# Patient Record
Sex: FEMALE | Race: BLACK OR AFRICAN AMERICAN | NOT HISPANIC OR LATINO | Employment: FULL TIME | URBAN - METROPOLITAN AREA
[De-identification: names, ages, dates, MRNs, and addresses within clinical notes are randomized per-mention and may not be internally consistent; named-entity substitution may affect disease eponyms.]

---

## 2017-09-20 ENCOUNTER — APPOINTMENT (OUTPATIENT)
Dept: LAB | Facility: HOSPITAL | Age: 52
End: 2017-09-20
Attending: OTOLARYNGOLOGY
Payer: COMMERCIAL

## 2017-09-20 ENCOUNTER — TRANSCRIBE ORDERS (OUTPATIENT)
Dept: LAB | Facility: HOSPITAL | Age: 52
End: 2017-09-20

## 2017-09-20 DIAGNOSIS — D44.0: Primary | ICD-10-CM

## 2017-09-20 PROCEDURE — 88173 CYTOPATH EVAL FNA REPORT: CPT

## 2019-04-08 ENCOUNTER — HOSPITAL ENCOUNTER (OUTPATIENT)
Facility: HOSPITAL | Age: 54
Setting detail: OBSERVATION
Discharge: HOME | End: 2019-04-09
Attending: EMERGENCY MEDICINE | Admitting: INTERNAL MEDICINE
Payer: COMMERCIAL

## 2019-04-08 DIAGNOSIS — R94.31 ABNORMAL ECG: ICD-10-CM

## 2019-04-08 DIAGNOSIS — R42 LIGHTHEADEDNESS: ICD-10-CM

## 2019-04-08 DIAGNOSIS — I10 HYPERTENSION: ICD-10-CM

## 2019-04-08 DIAGNOSIS — R07.9 CHEST PAIN, UNSPECIFIED TYPE: Primary | ICD-10-CM

## 2019-04-08 PROCEDURE — 93005 ELECTROCARDIOGRAM TRACING: CPT

## 2019-04-08 PROCEDURE — 99285 EMERGENCY DEPT VISIT HI MDM: CPT | Performed by: EMERGENCY MEDICINE

## 2019-04-08 PROCEDURE — 99285 EMERGENCY DEPT VISIT HI MDM: CPT

## 2019-04-09 ENCOUNTER — APPOINTMENT (OUTPATIENT)
Dept: NON INVASIVE DIAGNOSTICS | Facility: HOSPITAL | Age: 54
End: 2019-04-09
Payer: COMMERCIAL

## 2019-04-09 ENCOUNTER — APPOINTMENT (EMERGENCY)
Dept: RADIOLOGY | Facility: HOSPITAL | Age: 54
End: 2019-04-09
Payer: COMMERCIAL

## 2019-04-09 ENCOUNTER — APPOINTMENT (EMERGENCY)
Dept: CT IMAGING | Facility: HOSPITAL | Age: 54
End: 2019-04-09
Payer: COMMERCIAL

## 2019-04-09 VITALS
BODY MASS INDEX: 31.89 KG/M2 | DIASTOLIC BLOOD PRESSURE: 90 MMHG | WEIGHT: 180 LBS | SYSTOLIC BLOOD PRESSURE: 134 MMHG | RESPIRATION RATE: 21 BRPM | TEMPERATURE: 98.3 F | HEART RATE: 80 BPM | HEIGHT: 63 IN | OXYGEN SATURATION: 99 %

## 2019-04-09 PROBLEM — R07.9 CHEST PAIN: Status: ACTIVE | Noted: 2019-04-09

## 2019-04-09 PROBLEM — I10 HYPERTENSION: Chronic | Status: ACTIVE | Noted: 2019-04-09

## 2019-04-09 PROBLEM — D49.7 PITUITARY TUMOR: Chronic | Status: ACTIVE | Noted: 2019-04-09

## 2019-04-09 LAB
ALBUMIN SERPL BCP-MCNC: 4 G/DL (ref 3.5–5)
ALP SERPL-CCNC: 95 U/L (ref 46–116)
ALT SERPL W P-5'-P-CCNC: 32 U/L (ref 12–78)
ANION GAP SERPL CALCULATED.3IONS-SCNC: 8 MMOL/L (ref 4–13)
ANION GAP SERPL CALCULATED.3IONS-SCNC: 9 MMOL/L (ref 4–13)
APTT PPP: 30 SECONDS (ref 26–38)
AST SERPL W P-5'-P-CCNC: 21 U/L (ref 5–45)
ATRIAL RATE: 62 BPM
ATRIAL RATE: 77 BPM
ATRIAL RATE: 86 BPM
BASOPHILS # BLD AUTO: 0.04 THOUSANDS/ΜL (ref 0–0.1)
BASOPHILS # BLD AUTO: 0.04 THOUSANDS/ΜL (ref 0–0.1)
BASOPHILS NFR BLD AUTO: 1 % (ref 0–1)
BASOPHILS NFR BLD AUTO: 1 % (ref 0–1)
BILIRUB SERPL-MCNC: 0.2 MG/DL (ref 0.2–1)
BUN SERPL-MCNC: 13 MG/DL (ref 5–25)
BUN SERPL-MCNC: 15 MG/DL (ref 5–25)
CALCIUM SERPL-MCNC: 8.9 MG/DL (ref 8.3–10.1)
CALCIUM SERPL-MCNC: 9.4 MG/DL (ref 8.3–10.1)
CHEST PAIN STATEMENT: NORMAL
CHLORIDE SERPL-SCNC: 105 MMOL/L (ref 100–108)
CHLORIDE SERPL-SCNC: 109 MMOL/L (ref 100–108)
CHOLEST SERPL-MCNC: 231 MG/DL (ref 50–200)
CO2 SERPL-SCNC: 27 MMOL/L (ref 21–32)
CO2 SERPL-SCNC: 30 MMOL/L (ref 21–32)
CREAT SERPL-MCNC: 0.77 MG/DL (ref 0.6–1.3)
CREAT SERPL-MCNC: 0.85 MG/DL (ref 0.6–1.3)
EOSINOPHIL # BLD AUTO: 0.07 THOUSAND/ΜL (ref 0–0.61)
EOSINOPHIL # BLD AUTO: 0.15 THOUSAND/ΜL (ref 0–0.61)
EOSINOPHIL NFR BLD AUTO: 1 % (ref 0–6)
EOSINOPHIL NFR BLD AUTO: 2 % (ref 0–6)
ERYTHROCYTE [DISTWIDTH] IN BLOOD BY AUTOMATED COUNT: 14.2 % (ref 11.6–15.1)
ERYTHROCYTE [DISTWIDTH] IN BLOOD BY AUTOMATED COUNT: 14.4 % (ref 11.6–15.1)
GFR SERPL CREATININE-BSD FRML MDRD: 102 ML/MIN/1.73SQ M
GFR SERPL CREATININE-BSD FRML MDRD: 90 ML/MIN/1.73SQ M
GLUCOSE P FAST SERPL-MCNC: 95 MG/DL (ref 65–99)
GLUCOSE SERPL-MCNC: 105 MG/DL (ref 65–140)
GLUCOSE SERPL-MCNC: 95 MG/DL (ref 65–140)
HCT VFR BLD AUTO: 37.8 % (ref 34.8–46.1)
HCT VFR BLD AUTO: 41.2 % (ref 34.8–46.1)
HDLC SERPL-MCNC: 60 MG/DL (ref 40–60)
HGB BLD-MCNC: 11.8 G/DL (ref 11.5–15.4)
HGB BLD-MCNC: 12.9 G/DL (ref 11.5–15.4)
IMM GRANULOCYTES # BLD AUTO: 0.01 THOUSAND/UL (ref 0–0.2)
IMM GRANULOCYTES # BLD AUTO: 0.01 THOUSAND/UL (ref 0–0.2)
IMM GRANULOCYTES NFR BLD AUTO: 0 % (ref 0–2)
IMM GRANULOCYTES NFR BLD AUTO: 0 % (ref 0–2)
INR PPP: 1.05 (ref 0.86–1.17)
LDLC SERPL CALC-MCNC: 162 MG/DL (ref 0–100)
LYMPHOCYTES # BLD AUTO: 3.58 THOUSANDS/ΜL (ref 0.6–4.47)
LYMPHOCYTES # BLD AUTO: 4.11 THOUSANDS/ΜL (ref 0.6–4.47)
LYMPHOCYTES NFR BLD AUTO: 58 % (ref 14–44)
LYMPHOCYTES NFR BLD AUTO: 59 % (ref 14–44)
MAX DIASTOLIC BP: 90 MMHG
MAX HEART RATE: 148 BPM
MAX PREDICTED HEART RATE: 167 BPM
MAX. SYSTOLIC BP: 170 MMHG
MCH RBC QN AUTO: 27.3 PG (ref 26.8–34.3)
MCH RBC QN AUTO: 27.7 PG (ref 26.8–34.3)
MCHC RBC AUTO-ENTMCNC: 31.2 G/DL (ref 31.4–37.4)
MCHC RBC AUTO-ENTMCNC: 31.3 G/DL (ref 31.4–37.4)
MCV RBC AUTO: 88 FL (ref 82–98)
MCV RBC AUTO: 88 FL (ref 82–98)
MONOCYTES # BLD AUTO: 0.43 THOUSAND/ΜL (ref 0.17–1.22)
MONOCYTES # BLD AUTO: 0.44 THOUSAND/ΜL (ref 0.17–1.22)
MONOCYTES NFR BLD AUTO: 6 % (ref 4–12)
MONOCYTES NFR BLD AUTO: 7 % (ref 4–12)
NEUTROPHILS # BLD AUTO: 2.02 THOUSANDS/ΜL (ref 1.85–7.62)
NEUTROPHILS # BLD AUTO: 2.23 THOUSANDS/ΜL (ref 1.85–7.62)
NEUTS SEG NFR BLD AUTO: 32 % (ref 43–75)
NEUTS SEG NFR BLD AUTO: 33 % (ref 43–75)
NONHDLC SERPL-MCNC: 171 MG/DL
NRBC BLD AUTO-RTO: 0 /100 WBCS
NRBC BLD AUTO-RTO: 0 /100 WBCS
NT-PROBNP SERPL-MCNC: 19 PG/ML
P AXIS: 61 DEGREES
P AXIS: 67 DEGREES
P AXIS: 67 DEGREES
PLATELET # BLD AUTO: 215 THOUSANDS/UL (ref 149–390)
PLATELET # BLD AUTO: 235 THOUSANDS/UL (ref 149–390)
PMV BLD AUTO: 11.8 FL (ref 8.9–12.7)
PMV BLD AUTO: 12.1 FL (ref 8.9–12.7)
POTASSIUM SERPL-SCNC: 3.7 MMOL/L (ref 3.5–5.3)
POTASSIUM SERPL-SCNC: 4.4 MMOL/L (ref 3.5–5.3)
PR INTERVAL: 166 MS
PR INTERVAL: 178 MS
PR INTERVAL: 178 MS
PROT SERPL-MCNC: 8.4 G/DL (ref 6.4–8.2)
PROTHROMBIN TIME: 13.6 SECONDS (ref 11.8–14.2)
PROTOCOL NAME: NORMAL
QRS AXIS: 52 DEGREES
QRS AXIS: 54 DEGREES
QRS AXIS: 61 DEGREES
QRSD INTERVAL: 88 MS
QRSD INTERVAL: 88 MS
QRSD INTERVAL: 90 MS
QT INTERVAL: 362 MS
QT INTERVAL: 386 MS
QT INTERVAL: 406 MS
QTC INTERVAL: 391 MS
QTC INTERVAL: 433 MS
QTC INTERVAL: 459 MS
RBC # BLD AUTO: 4.32 MILLION/UL (ref 3.81–5.12)
RBC # BLD AUTO: 4.66 MILLION/UL (ref 3.81–5.12)
REASON FOR TERMINATION: NORMAL
SODIUM SERPL-SCNC: 143 MMOL/L (ref 136–145)
SODIUM SERPL-SCNC: 145 MMOL/L (ref 136–145)
T WAVE AXIS: -15 DEGREES
T WAVE AXIS: -65 DEGREES
T WAVE AXIS: 30 DEGREES
TARGET HR FORMULA: NORMAL
TEST INDICATION: NORMAL
TIME IN EXERCISE PHASE: NORMAL
TRIGL SERPL-MCNC: 43 MG/DL
TROPONIN I SERPL-MCNC: <0.02 NG/ML
VENTRICULAR RATE: 62 BPM
VENTRICULAR RATE: 77 BPM
VENTRICULAR RATE: 86 BPM
WBC # BLD AUTO: 6.15 THOUSAND/UL (ref 4.31–10.16)
WBC # BLD AUTO: 6.98 THOUSAND/UL (ref 4.31–10.16)

## 2019-04-09 PROCEDURE — 99219 PR INITIAL OBSERVATION CARE/DAY 50 MINUTES: CPT | Performed by: INTERNAL MEDICINE

## 2019-04-09 PROCEDURE — 84484 ASSAY OF TROPONIN QUANT: CPT | Performed by: INTERNAL MEDICINE

## 2019-04-09 PROCEDURE — 70450 CT HEAD/BRAIN W/O DYE: CPT

## 2019-04-09 PROCEDURE — 80048 BASIC METABOLIC PNL TOTAL CA: CPT | Performed by: INTERNAL MEDICINE

## 2019-04-09 PROCEDURE — 99244 OFF/OP CNSLTJ NEW/EST MOD 40: CPT | Performed by: INTERNAL MEDICINE

## 2019-04-09 PROCEDURE — 36415 COLL VENOUS BLD VENIPUNCTURE: CPT | Performed by: INTERNAL MEDICINE

## 2019-04-09 PROCEDURE — 84484 ASSAY OF TROPONIN QUANT: CPT | Performed by: EMERGENCY MEDICINE

## 2019-04-09 PROCEDURE — 85025 COMPLETE CBC W/AUTO DIFF WBC: CPT | Performed by: INTERNAL MEDICINE

## 2019-04-09 PROCEDURE — 99217 PR OBSERVATION CARE DISCHARGE MANAGEMENT: CPT | Performed by: INTERNAL MEDICINE

## 2019-04-09 PROCEDURE — 93005 ELECTROCARDIOGRAM TRACING: CPT

## 2019-04-09 PROCEDURE — 80061 LIPID PANEL: CPT | Performed by: INTERNAL MEDICINE

## 2019-04-09 PROCEDURE — 71045 X-RAY EXAM CHEST 1 VIEW: CPT

## 2019-04-09 PROCEDURE — 85730 THROMBOPLASTIN TIME PARTIAL: CPT | Performed by: EMERGENCY MEDICINE

## 2019-04-09 PROCEDURE — 93010 ELECTROCARDIOGRAM REPORT: CPT | Performed by: INTERNAL MEDICINE

## 2019-04-09 PROCEDURE — 80053 COMPREHEN METABOLIC PANEL: CPT | Performed by: EMERGENCY MEDICINE

## 2019-04-09 PROCEDURE — 85610 PROTHROMBIN TIME: CPT | Performed by: EMERGENCY MEDICINE

## 2019-04-09 PROCEDURE — 85025 COMPLETE CBC W/AUTO DIFF WBC: CPT | Performed by: EMERGENCY MEDICINE

## 2019-04-09 PROCEDURE — 83880 ASSAY OF NATRIURETIC PEPTIDE: CPT | Performed by: EMERGENCY MEDICINE

## 2019-04-09 PROCEDURE — 93350 STRESS TTE ONLY: CPT

## 2019-04-09 PROCEDURE — 36415 COLL VENOUS BLD VENIPUNCTURE: CPT | Performed by: EMERGENCY MEDICINE

## 2019-04-09 RX ORDER — ATORVASTATIN CALCIUM 20 MG/1
20 TABLET, FILM COATED ORAL DAILY
COMMUNITY
End: 2021-04-20 | Stop reason: SDUPTHER

## 2019-04-09 RX ORDER — HYDROCHLOROTHIAZIDE 12.5 MG/1
12.5 TABLET ORAL DAILY
Status: DISCONTINUED | OUTPATIENT
Start: 2019-04-09 | End: 2019-04-09 | Stop reason: HOSPADM

## 2019-04-09 RX ORDER — ATORVASTATIN CALCIUM 20 MG/1
20 TABLET, FILM COATED ORAL
Status: DISCONTINUED | OUTPATIENT
Start: 2019-04-09 | End: 2019-04-09 | Stop reason: HOSPADM

## 2019-04-09 RX ORDER — METOPROLOL TARTRATE AND HYDROCHLOROTHIAZIDE 100; 25 MG/1; MG/1
1 TABLET ORAL DAILY
COMMUNITY
End: 2019-10-31

## 2019-04-09 RX ORDER — NITROGLYCERIN 0.4 MG/1
0.4 TABLET SUBLINGUAL ONCE
Status: COMPLETED | OUTPATIENT
Start: 2019-04-09 | End: 2019-04-09

## 2019-04-09 RX ORDER — METOPROLOL SUCCINATE 100 MG/1
100 TABLET, EXTENDED RELEASE ORAL DAILY
Status: DISCONTINUED | OUTPATIENT
Start: 2019-04-09 | End: 2019-04-09 | Stop reason: HOSPADM

## 2019-04-09 RX ORDER — ASPIRIN 81 MG/1
162 TABLET, CHEWABLE ORAL ONCE
Status: COMPLETED | OUTPATIENT
Start: 2019-04-09 | End: 2019-04-09

## 2019-04-09 RX ORDER — ASPIRIN 81 MG/1
81 TABLET, CHEWABLE ORAL DAILY
COMMUNITY
End: 2019-11-11 | Stop reason: HOSPADM

## 2019-04-09 RX ORDER — ASPIRIN 81 MG/1
81 TABLET ORAL DAILY
Status: DISCONTINUED | OUTPATIENT
Start: 2019-04-09 | End: 2019-04-09 | Stop reason: HOSPADM

## 2019-04-09 RX ORDER — HYDROCHLOROTHIAZIDE 12.5 MG/1
12.5 TABLET ORAL DAILY
COMMUNITY
End: 2020-05-14 | Stop reason: SDUPTHER

## 2019-04-09 RX ORDER — HEPARIN SODIUM 5000 [USP'U]/ML
5000 INJECTION, SOLUTION INTRAVENOUS; SUBCUTANEOUS EVERY 8 HOURS SCHEDULED
Status: DISCONTINUED | OUTPATIENT
Start: 2019-04-09 | End: 2019-04-09 | Stop reason: HOSPADM

## 2019-04-09 RX ORDER — HYDRALAZINE HYDROCHLORIDE 20 MG/ML
5 INJECTION INTRAMUSCULAR; INTRAVENOUS EVERY 6 HOURS PRN
Status: DISCONTINUED | OUTPATIENT
Start: 2019-04-09 | End: 2019-04-09 | Stop reason: HOSPADM

## 2019-04-09 RX ORDER — ACETAMINOPHEN 325 MG/1
650 TABLET ORAL EVERY 4 HOURS PRN
Status: DISCONTINUED | OUTPATIENT
Start: 2019-04-09 | End: 2019-04-09 | Stop reason: HOSPADM

## 2019-04-09 RX ADMIN — HEPARIN SODIUM 5000 UNITS: 5000 INJECTION INTRAVENOUS; SUBCUTANEOUS at 06:11

## 2019-04-09 RX ADMIN — SODIUM CHLORIDE 500 ML: 0.9 INJECTION, SOLUTION INTRAVENOUS at 02:17

## 2019-04-09 RX ADMIN — HYDRALAZINE HYDROCHLORIDE 5 MG: 20 INJECTION INTRAMUSCULAR; INTRAVENOUS at 10:27

## 2019-04-09 RX ADMIN — ASPIRIN 81 MG: 81 TABLET, COATED ORAL at 09:15

## 2019-04-09 RX ADMIN — NITROGLYCERIN 0.4 MG: 0.4 TABLET SUBLINGUAL at 00:22

## 2019-04-09 RX ADMIN — METOPROLOL SUCCINATE 100 MG: 100 TABLET, FILM COATED, EXTENDED RELEASE ORAL at 02:41

## 2019-04-09 RX ADMIN — HYDRALAZINE HYDROCHLORIDE 5 MG: 20 INJECTION INTRAMUSCULAR; INTRAVENOUS at 03:56

## 2019-04-09 RX ADMIN — ASPIRIN 81 MG 162 MG: 81 TABLET ORAL at 00:21

## 2019-04-09 RX ADMIN — HYDROCHLOROTHIAZIDE 12.5 MG: 12.5 TABLET ORAL at 09:14

## 2019-04-10 PROCEDURE — 93351 STRESS TTE COMPLETE: CPT | Performed by: INTERNAL MEDICINE

## 2019-04-24 ENCOUNTER — APPOINTMENT (OUTPATIENT)
Dept: LAB | Facility: CLINIC | Age: 54
End: 2019-04-24
Payer: COMMERCIAL

## 2019-04-24 ENCOUNTER — TRANSCRIBE ORDERS (OUTPATIENT)
Dept: LAB | Facility: CLINIC | Age: 54
End: 2019-04-24

## 2019-04-24 DIAGNOSIS — E11.9 TYPE 2 DIABETES MELLITUS WITHOUT COMPLICATION, UNSPECIFIED WHETHER LONG TERM INSULIN USE (HCC): Primary | ICD-10-CM

## 2019-04-24 DIAGNOSIS — E11.9 TYPE 2 DIABETES MELLITUS WITHOUT COMPLICATION, UNSPECIFIED WHETHER LONG TERM INSULIN USE (HCC): ICD-10-CM

## 2019-04-24 LAB
ALBUMIN SERPL BCP-MCNC: 3.8 G/DL (ref 3.5–5)
ALP SERPL-CCNC: 94 U/L (ref 46–116)
ALT SERPL W P-5'-P-CCNC: 42 U/L (ref 12–78)
ANION GAP SERPL CALCULATED.3IONS-SCNC: 5 MMOL/L (ref 4–13)
AST SERPL W P-5'-P-CCNC: 23 U/L (ref 5–45)
BASOPHILS # BLD AUTO: 0.07 THOUSANDS/ΜL (ref 0–0.1)
BASOPHILS NFR BLD AUTO: 1 % (ref 0–1)
BILIRUB SERPL-MCNC: 0.23 MG/DL (ref 0.2–1)
BUN SERPL-MCNC: 20 MG/DL (ref 5–25)
CALCIUM SERPL-MCNC: 9.4 MG/DL (ref 8.3–10.1)
CHLORIDE SERPL-SCNC: 109 MMOL/L (ref 100–108)
CHOLEST SERPL-MCNC: 190 MG/DL (ref 50–200)
CO2 SERPL-SCNC: 30 MMOL/L (ref 21–32)
CREAT SERPL-MCNC: 1.01 MG/DL (ref 0.6–1.3)
CREAT UR-MCNC: 190 MG/DL
EOSINOPHIL # BLD AUTO: 0.19 THOUSAND/ΜL (ref 0–0.61)
EOSINOPHIL NFR BLD AUTO: 2 % (ref 0–6)
ERYTHROCYTE [DISTWIDTH] IN BLOOD BY AUTOMATED COUNT: 14.5 % (ref 11.6–15.1)
EST. AVERAGE GLUCOSE BLD GHB EST-MCNC: 137 MG/DL
GFR SERPL CREATININE-BSD FRML MDRD: 73 ML/MIN/1.73SQ M
GLUCOSE P FAST SERPL-MCNC: 90 MG/DL (ref 65–99)
HBA1C MFR BLD: 6.4 % (ref 4.2–6.3)
HCT VFR BLD AUTO: 42.6 % (ref 34.8–46.1)
HDLC SERPL-MCNC: 64 MG/DL (ref 40–60)
HGB BLD-MCNC: 12.9 G/DL (ref 11.5–15.4)
IMM GRANULOCYTES # BLD AUTO: 0.03 THOUSAND/UL (ref 0–0.2)
IMM GRANULOCYTES NFR BLD AUTO: 0 % (ref 0–2)
LDLC SERPL CALC-MCNC: 113 MG/DL (ref 0–100)
LYMPHOCYTES # BLD AUTO: 4.79 THOUSANDS/ΜL (ref 0.6–4.47)
LYMPHOCYTES NFR BLD AUTO: 58 % (ref 14–44)
MCH RBC QN AUTO: 27.3 PG (ref 26.8–34.3)
MCHC RBC AUTO-ENTMCNC: 30.3 G/DL (ref 31.4–37.4)
MCV RBC AUTO: 90 FL (ref 82–98)
MICROALBUMIN UR-MCNC: 11.2 MG/L (ref 0–20)
MICROALBUMIN/CREAT 24H UR: 6 MG/G CREATININE (ref 0–30)
MONOCYTES # BLD AUTO: 0.51 THOUSAND/ΜL (ref 0.17–1.22)
MONOCYTES NFR BLD AUTO: 6 % (ref 4–12)
NEUTROPHILS # BLD AUTO: 2.72 THOUSANDS/ΜL (ref 1.85–7.62)
NEUTS SEG NFR BLD AUTO: 33 % (ref 43–75)
NONHDLC SERPL-MCNC: 126 MG/DL
NRBC BLD AUTO-RTO: 0 /100 WBCS
PLATELET # BLD AUTO: 235 THOUSANDS/UL (ref 149–390)
PMV BLD AUTO: 13.1 FL (ref 8.9–12.7)
POTASSIUM SERPL-SCNC: 4.1 MMOL/L (ref 3.5–5.3)
PROT SERPL-MCNC: 7.9 G/DL (ref 6.4–8.2)
RBC # BLD AUTO: 4.72 MILLION/UL (ref 3.81–5.12)
SODIUM SERPL-SCNC: 144 MMOL/L (ref 136–145)
TRIGL SERPL-MCNC: 64 MG/DL
WBC # BLD AUTO: 8.31 THOUSAND/UL (ref 4.31–10.16)

## 2019-04-24 PROCEDURE — 80061 LIPID PANEL: CPT

## 2019-04-24 PROCEDURE — 85025 COMPLETE CBC W/AUTO DIFF WBC: CPT

## 2019-04-24 PROCEDURE — 82043 UR ALBUMIN QUANTITATIVE: CPT

## 2019-04-24 PROCEDURE — 80053 COMPREHEN METABOLIC PANEL: CPT

## 2019-04-24 PROCEDURE — 83036 HEMOGLOBIN GLYCOSYLATED A1C: CPT

## 2019-04-24 PROCEDURE — 36415 COLL VENOUS BLD VENIPUNCTURE: CPT

## 2019-04-24 PROCEDURE — 82570 ASSAY OF URINE CREATININE: CPT

## 2019-04-26 ENCOUNTER — TRANSCRIBE ORDERS (OUTPATIENT)
Dept: ADMINISTRATIVE | Facility: HOSPITAL | Age: 54
End: 2019-04-26

## 2019-04-26 DIAGNOSIS — D35.2 BENIGN NEOPLASM OF PITUITARY GLAND AND CRANIOPHARYNGEAL DUCT (POUCH) (HCC): Primary | ICD-10-CM

## 2019-04-26 DIAGNOSIS — D35.3 BENIGN NEOPLASM OF PITUITARY GLAND AND CRANIOPHARYNGEAL DUCT (POUCH) (HCC): Primary | ICD-10-CM

## 2019-05-01 ENCOUNTER — HOSPITAL ENCOUNTER (OUTPATIENT)
Dept: MRI IMAGING | Facility: HOSPITAL | Age: 54
Discharge: HOME | End: 2019-05-01
Payer: COMMERCIAL

## 2019-05-01 DIAGNOSIS — D35.2 BENIGN NEOPLASM OF PITUITARY GLAND AND CRANIOPHARYNGEAL DUCT (POUCH) (HCC): ICD-10-CM

## 2019-05-01 DIAGNOSIS — D35.3 BENIGN NEOPLASM OF PITUITARY GLAND AND CRANIOPHARYNGEAL DUCT (POUCH) (HCC): ICD-10-CM

## 2019-05-01 PROCEDURE — A9585 GADOBUTROL INJECTION: HCPCS | Performed by: RADIOLOGY

## 2019-05-01 PROCEDURE — 70553 MRI BRAIN STEM W/O & W/DYE: CPT

## 2019-05-01 RX ADMIN — GADOBUTROL 8 ML: 604.72 INJECTION INTRAVENOUS at 10:04

## 2019-05-03 ENCOUNTER — APPOINTMENT (OUTPATIENT)
Dept: LAB | Facility: CLINIC | Age: 54
End: 2019-05-03
Payer: COMMERCIAL

## 2019-05-03 ENCOUNTER — TRANSCRIBE ORDERS (OUTPATIENT)
Dept: LAB | Facility: CLINIC | Age: 54
End: 2019-05-03

## 2019-05-03 DIAGNOSIS — D35.2 BENIGN NEOPLASM OF PITUITARY GLAND AND CRANIOPHARYNGEAL DUCT (POUCH) (HCC): ICD-10-CM

## 2019-05-03 DIAGNOSIS — D35.2 BENIGN NEOPLASM OF PITUITARY GLAND AND CRANIOPHARYNGEAL DUCT (POUCH) (HCC): Primary | ICD-10-CM

## 2019-05-03 DIAGNOSIS — D35.3 BENIGN NEOPLASM OF PITUITARY GLAND AND CRANIOPHARYNGEAL DUCT (POUCH) (HCC): Primary | ICD-10-CM

## 2019-05-03 DIAGNOSIS — D35.3 BENIGN NEOPLASM OF PITUITARY GLAND AND CRANIOPHARYNGEAL DUCT (POUCH) (HCC): ICD-10-CM

## 2019-05-03 LAB
CORTIS SERPL-MCNC: 20 UG/DL
ESTRADIOL SERPL-MCNC: <11 PG/ML
FSH SERPL-ACNC: 52.9 MIU/ML
LH SERPL-ACNC: 20.2 MIU/ML
PROLACTIN SERPL-MCNC: 6.1 NG/ML
T4 FREE SERPL-MCNC: 0.95 NG/DL (ref 0.76–1.46)
TSH SERPL DL<=0.05 MIU/L-ACNC: 1.07 UIU/ML (ref 0.36–3.74)

## 2019-05-03 PROCEDURE — 84146 ASSAY OF PROLACTIN: CPT

## 2019-05-03 PROCEDURE — 83001 ASSAY OF GONADOTROPIN (FSH): CPT

## 2019-05-03 PROCEDURE — 83002 ASSAY OF GONADOTROPIN (LH): CPT

## 2019-05-03 PROCEDURE — 36415 COLL VENOUS BLD VENIPUNCTURE: CPT

## 2019-05-03 PROCEDURE — 82670 ASSAY OF TOTAL ESTRADIOL: CPT

## 2019-05-03 PROCEDURE — 84443 ASSAY THYROID STIM HORMONE: CPT

## 2019-05-03 PROCEDURE — 84305 ASSAY OF SOMATOMEDIN: CPT

## 2019-05-03 PROCEDURE — 82533 TOTAL CORTISOL: CPT

## 2019-05-03 PROCEDURE — 84439 ASSAY OF FREE THYROXINE: CPT

## 2019-05-05 LAB — IGF-I SERPL-MCNC: 165 NG/ML (ref 53–190)

## 2019-07-01 ENCOUNTER — APPOINTMENT (EMERGENCY)
Dept: CT IMAGING | Facility: HOSPITAL | Age: 54
End: 2019-07-01
Payer: COMMERCIAL

## 2019-07-01 ENCOUNTER — HOSPITAL ENCOUNTER (EMERGENCY)
Facility: HOSPITAL | Age: 54
Discharge: HOME | End: 2019-07-02
Attending: EMERGENCY MEDICINE | Admitting: EMERGENCY MEDICINE
Payer: COMMERCIAL

## 2019-07-01 ENCOUNTER — OFFICE VISIT (OUTPATIENT)
Dept: URGENT CARE | Facility: CLINIC | Age: 54
End: 2019-07-01
Payer: COMMERCIAL

## 2019-07-01 VITALS
WEIGHT: 182 LBS | SYSTOLIC BLOOD PRESSURE: 154 MMHG | HEIGHT: 63 IN | RESPIRATION RATE: 16 BRPM | OXYGEN SATURATION: 99 % | BODY MASS INDEX: 32.25 KG/M2 | DIASTOLIC BLOOD PRESSURE: 90 MMHG | HEART RATE: 74 BPM | TEMPERATURE: 97.5 F

## 2019-07-01 DIAGNOSIS — R42 DIZZINESS: Primary | ICD-10-CM

## 2019-07-01 DIAGNOSIS — R42 DIZZY: Primary | ICD-10-CM

## 2019-07-01 LAB
ALBUMIN SERPL BCP-MCNC: 3.7 G/DL (ref 3.5–5)
ALP SERPL-CCNC: 86 U/L (ref 46–116)
ALT SERPL W P-5'-P-CCNC: 29 U/L (ref 12–78)
ANION GAP SERPL CALCULATED.3IONS-SCNC: 7 MMOL/L (ref 4–13)
AST SERPL W P-5'-P-CCNC: 30 U/L (ref 5–45)
BASOPHILS # BLD AUTO: 0.04 THOUSANDS/ΜL (ref 0–0.1)
BASOPHILS NFR BLD AUTO: 1 % (ref 0–1)
BILIRUB SERPL-MCNC: 0.2 MG/DL (ref 0.2–1)
BUN SERPL-MCNC: 18 MG/DL (ref 5–25)
CALCIUM SERPL-MCNC: 9.3 MG/DL (ref 8.3–10.1)
CHLORIDE SERPL-SCNC: 106 MMOL/L (ref 100–108)
CO2 SERPL-SCNC: 30 MMOL/L (ref 21–32)
CREAT SERPL-MCNC: 1.04 MG/DL (ref 0.6–1.3)
EOSINOPHIL # BLD AUTO: 0.16 THOUSAND/ΜL (ref 0–0.61)
EOSINOPHIL NFR BLD AUTO: 2 % (ref 0–6)
ERYTHROCYTE [DISTWIDTH] IN BLOOD BY AUTOMATED COUNT: 14.1 % (ref 11.6–15.1)
GFR SERPL CREATININE-BSD FRML MDRD: 71 ML/MIN/1.73SQ M
GLUCOSE SERPL-MCNC: 85 MG/DL (ref 65–140)
HCT VFR BLD AUTO: 37.8 % (ref 34.8–46.1)
HGB BLD-MCNC: 12.2 G/DL (ref 11.5–15.4)
IMM GRANULOCYTES # BLD AUTO: 0.01 THOUSAND/UL (ref 0–0.2)
IMM GRANULOCYTES NFR BLD AUTO: 0 % (ref 0–2)
LYMPHOCYTES # BLD AUTO: 3.81 THOUSANDS/ΜL (ref 0.6–4.47)
LYMPHOCYTES NFR BLD AUTO: 57 % (ref 14–44)
MCH RBC QN AUTO: 27.9 PG (ref 26.8–34.3)
MCHC RBC AUTO-ENTMCNC: 32.3 G/DL (ref 31.4–37.4)
MCV RBC AUTO: 86 FL (ref 82–98)
MONOCYTES # BLD AUTO: 0.54 THOUSAND/ΜL (ref 0.17–1.22)
MONOCYTES NFR BLD AUTO: 8 % (ref 4–12)
NEUTROPHILS # BLD AUTO: 2.11 THOUSANDS/ΜL (ref 1.85–7.62)
NEUTS SEG NFR BLD AUTO: 32 % (ref 43–75)
NRBC BLD AUTO-RTO: 0 /100 WBCS
PLATELET # BLD AUTO: 224 THOUSANDS/UL (ref 149–390)
PMV BLD AUTO: 12 FL (ref 8.9–12.7)
POTASSIUM SERPL-SCNC: 3.7 MMOL/L (ref 3.5–5.3)
PROT SERPL-MCNC: 8 G/DL (ref 6.4–8.2)
RBC # BLD AUTO: 4.38 MILLION/UL (ref 3.81–5.12)
SODIUM SERPL-SCNC: 143 MMOL/L (ref 136–145)
TROPONIN I SERPL-MCNC: <0.02 NG/ML
WBC # BLD AUTO: 6.67 THOUSAND/UL (ref 4.31–10.16)

## 2019-07-01 PROCEDURE — 84484 ASSAY OF TROPONIN QUANT: CPT | Performed by: PHYSICIAN ASSISTANT

## 2019-07-01 PROCEDURE — 96360 HYDRATION IV INFUSION INIT: CPT

## 2019-07-01 PROCEDURE — 99284 EMERGENCY DEPT VISIT MOD MDM: CPT

## 2019-07-01 PROCEDURE — 99213 OFFICE O/P EST LOW 20 MIN: CPT | Performed by: PHYSICIAN ASSISTANT

## 2019-07-01 PROCEDURE — 80053 COMPREHEN METABOLIC PANEL: CPT | Performed by: PHYSICIAN ASSISTANT

## 2019-07-01 PROCEDURE — 93005 ELECTROCARDIOGRAM TRACING: CPT | Performed by: PHYSICIAN ASSISTANT

## 2019-07-01 PROCEDURE — S9088 SERVICES PROVIDED IN URGENT: HCPCS | Performed by: PHYSICIAN ASSISTANT

## 2019-07-01 PROCEDURE — 93005 ELECTROCARDIOGRAM TRACING: CPT

## 2019-07-01 PROCEDURE — 36415 COLL VENOUS BLD VENIPUNCTURE: CPT | Performed by: PHYSICIAN ASSISTANT

## 2019-07-01 PROCEDURE — 85025 COMPLETE CBC W/AUTO DIFF WBC: CPT | Performed by: PHYSICIAN ASSISTANT

## 2019-07-01 PROCEDURE — 70450 CT HEAD/BRAIN W/O DYE: CPT

## 2019-07-01 RX ADMIN — SODIUM CHLORIDE 1000 ML: 0.9 INJECTION, SOLUTION INTRAVENOUS at 22:52

## 2019-07-01 NOTE — PROGRESS NOTES
[facility]        NAME: Juan C Miranda is a 49 y o  female  : 1965    MRN: [id number]  DATE: 2019  TIME: 6:49 PM    Assessment and Plan   Dizzy [R42]  1  Dizzy  Transfer to other facility    POCT ECG     Patient advised to proceed to ER for further evaluation   Patient Instructions     Follow up with PCP in 3-5 days  Proceed to  ER if symptoms worsen  Chief Complaint     Chief Complaint   Patient presents with    Dizziness     started Saturday, states she's had it before and it comes and goes    Neck Pain     posterior neck pain         History of Present Illness       71-year-old female past medical history of hypertension and pituitary tumor presents for evaluation of dizziness  Patient states the dizziness started on Saturday  She states that the dizziness is positional however she states she has been dizzy with walking today  She states she has not had any syncopal episodes  Patient states when she was walking and Urgent Care today she had chest pain that lasted minutes  She states the chest pain comes and goes  Denies any radiation of symptoms  Patient states she has no history of vertigo  She has not followed up with neuro surgery  Patient was seen and admitted 2019 for similar symptoms CP with associated dizziness from chronic pituitary tumor  Denies palpitations, diaphoresis or SOB  Denies extremity weakness  Denies speech difficulty  Review of Systems   Review of Systems   Constitutional: Negative for chills, fatigue and fever  HENT: Negative for congestion, ear pain, sinus pain, sore throat and trouble swallowing  Eyes: Negative for pain, discharge and redness  Respiratory: Negative for cough, chest tightness, shortness of breath and wheezing  Cardiovascular: Negative for chest pain, palpitations and leg swelling  Gastrointestinal: Negative for abdominal pain, diarrhea, nausea and vomiting     Musculoskeletal: Negative for arthralgias, joint swelling and myalgias  Skin: Negative for rash  Neurological: Positive for dizziness  Negative for tremors, seizures, syncope, speech difficulty, weakness, light-headedness, numbness and headaches  Current Medications       Current Outpatient Medications:     aspirin 81 mg chewable tablet, Chew 81 mg daily, Disp: , Rfl:     atorvastatin (LIPITOR) 20 mg tablet, Take 20 mg by mouth daily, Disp: , Rfl:     hydrochlorothiazide (HYDRODIURIL) 12 5 mg tablet, Take 12 5 mg by mouth daily, Disp: , Rfl:     metoprolol-hydrochlorothiazide (LOPRESSOR HCT) 100-25 MG per tablet, Take 1 tablet by mouth daily, Disp: , Rfl:     Current Allergies     Allergies as of 2019    (No Known Allergies)            The following portions of the patient's history were reviewed and updated as appropriate: allergies, current medications, past family history, past medical history, past social history, past surgical history and problem list      Past Medical History:   Diagnosis Date    Hypercholesteremia     Hypertension     Pituitary tumor        Past Surgical History:   Procedure Laterality Date     SECTION      TRANSPHENOIDAL / TRANSNASAL HYPOPHYSECTOMY / RESECTION PITUITARY TUMOR         Family History   Problem Relation Age of Onset    Cancer Mother     Heart disease Father     Hypertension Father          Medications have been verified  Objective   /90   Pulse 74   Temp 97 5 °F (36 4 °C) (Temporal)   Resp 16   Ht 5' 3" (1 6 m)   Wt 82 6 kg (182 lb)   SpO2 99%   BMI 32 24 kg/m²        Physical Exam     Physical Exam   Constitutional: She is oriented to person, place, and time  She appears well-developed and well-nourished  No distress  HENT:   Head: Normocephalic  Right Ear: External ear normal    Left Ear: External ear normal    Mouth/Throat: Oropharynx is clear and moist    Eyes: Pupils are equal, round, and reactive to light   Conjunctivae and EOM are normal    Neck: Normal range of motion  Neck supple  Cardiovascular: Normal rate, regular rhythm and normal heart sounds  No murmur heard  Pulmonary/Chest: Effort normal and breath sounds normal  No respiratory distress  She has no wheezes  Abdominal: Soft  Bowel sounds are normal  There is no tenderness  Musculoskeletal: Normal range of motion  Lymphadenopathy:     She has no cervical adenopathy  Neurological: She is alert and oriented to person, place, and time  She has normal reflexes  Skin: Skin is warm and dry  Psychiatric: She has a normal mood and affect  Nursing note and vitals reviewed

## 2019-07-02 VITALS
BODY MASS INDEX: 32.25 KG/M2 | HEART RATE: 55 BPM | DIASTOLIC BLOOD PRESSURE: 94 MMHG | WEIGHT: 182 LBS | RESPIRATION RATE: 22 BRPM | TEMPERATURE: 98.2 F | HEIGHT: 63 IN | SYSTOLIC BLOOD PRESSURE: 155 MMHG | OXYGEN SATURATION: 100 %

## 2019-07-02 PROBLEM — R42 DIZZINESS: Status: ACTIVE | Noted: 2019-07-02

## 2019-07-02 LAB
ATRIAL RATE: 59 BPM
ATRIAL RATE: 71 BPM
P AXIS: 61 DEGREES
P AXIS: 67 DEGREES
PR INTERVAL: 182 MS
PR INTERVAL: 184 MS
QRS AXIS: 61 DEGREES
QRS AXIS: 62 DEGREES
QRSD INTERVAL: 86 MS
QRSD INTERVAL: 98 MS
QT INTERVAL: 386 MS
QT INTERVAL: 418 MS
QTC INTERVAL: 413 MS
QTC INTERVAL: 419 MS
T WAVE AXIS: -57 DEGREES
T WAVE AXIS: -61 DEGREES
VENTRICULAR RATE: 59 BPM
VENTRICULAR RATE: 71 BPM

## 2019-07-02 PROCEDURE — 93010 ELECTROCARDIOGRAM REPORT: CPT | Performed by: INTERNAL MEDICINE

## 2019-07-02 PROCEDURE — 99284 EMERGENCY DEPT VISIT MOD MDM: CPT | Performed by: EMERGENCY MEDICINE

## 2019-07-02 RX ORDER — MECLIZINE HCL 12.5 MG/1
25 TABLET ORAL 3 TIMES DAILY PRN
Qty: 30 TABLET | Refills: 0 | Status: SHIPPED | OUTPATIENT
Start: 2019-07-02 | End: 2019-08-20 | Stop reason: SDUPTHER

## 2019-07-02 NOTE — ED PROVIDER NOTES
History  Chief Complaint   Patient presents with    Dizziness     Pt reports dizziness began Saturday and pt was seen at urgent care and advised to come to ED for difference in EKG  Pt reports dizziness is all the time, even while sitting  Pt reports "nagging chest pain" beginning this afternoon, pain is intermittent  Pt denies sob  Patient is a 49year old female presents emergency department complaints of dizziness  Patient has had chronic dizziness and has been admitted in the past for this  Patient has history of pituitary adenoma has been recommended surgical resection  Patient has had surgical resection of the tumor in the past but there was recurrence  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  Taking?   aspirin 81 mg chewable tablet   Yes No   Sig: Chew 81 mg daily   atorvastatin (LIPITOR) 20 mg tablet   Yes No   Sig: Take 20 mg by mouth daily   hydrochlorothiazide (HYDRODIURIL) 12 5 mg tablet   Yes No   Sig: Take 12 5 mg by mouth daily   metoprolol-hydrochlorothiazide (LOPRESSOR HCT) 100-25 MG per tablet   Yes No   Sig: Take 1 tablet by mouth daily      Facility-Administered Medications: None       Past Medical History:   Diagnosis Date    Hypercholesteremia     Hypertension     Pituitary tumor        Past Surgical History:   Procedure Laterality Date     SECTION      TRANSPHENOIDAL / TRANSNASAL HYPOPHYSECTOMY / RESECTION PITUITARY TUMOR         Family History   Problem Relation Age of Onset    Cancer Mother     Heart disease Father     Hypertension Father      I have reviewed and agree with the history as documented  Social History     Tobacco Use    Smoking status: Never Smoker    Smokeless tobacco: Never Used   Substance Use Topics    Alcohol use: Not Currently     Frequency: Monthly or less    Drug use: Not Currently        Review of Systems   Constitutional: Negative for fever  Respiratory: Negative for shortness of breath  Cardiovascular: Positive for chest pain  Neurological: Positive for dizziness  All other systems reviewed and are negative  Physical Exam  Physical Exam   Constitutional: She is oriented to person, place, and time  She appears well-developed and well-nourished  HENT:   Head: Normocephalic and atraumatic  Right Ear: External ear normal    Left Ear: External ear normal    Nose: Nose normal    Mouth/Throat: Oropharynx is clear and moist    Eyes: Pupils are equal, round, and reactive to light  Conjunctivae and EOM are normal    Neck: Normal range of motion  Cardiovascular: Normal rate, regular rhythm and normal heart sounds  Pulmonary/Chest: Effort normal and breath sounds normal    Abdominal: Soft  Bowel sounds are normal    Musculoskeletal: Normal range of motion  Neurological: She is alert and oriented to person, place, and time  Psychiatric: She has a normal mood and affect  Her behavior is normal  Judgment and thought content normal    Vitals reviewed        Vital Signs  ED Triage Vitals [07/01/19 2041]   Temperature Pulse Respirations Blood Pressure SpO2   98 2 °F (36 8 °C) 68 18 (!) 170/101 98 %      Temp Source Heart Rate Source Patient Position - Orthostatic VS BP Location FiO2 (%)   Oral Monitor Sitting Left arm --      Pain Score       No Pain           Vitals:    07/01/19 2041 07/01/19 2353 07/02/19 0000   BP: (!) 170/101 (!) 164/101 155/94   Pulse: 68 56 55   Patient Position - Orthostatic VS: Sitting Lying          Visual Acuity      ED Medications  Medications   sodium chloride 0 9 % bolus 1,000 mL (0 mL Intravenous Stopped 7/2/19 0007)       Diagnostic Studies  Results Reviewed     Procedure Component Value Units Date/Time    Troponin I [[id number]]  (Normal) Collected:  07/01/19 2250    Lab Status:  Final result Specimen:  Blood from Arm, Right Updated:  07/01/19 2314     Troponin I <0 02 ng/mL     Comprehensive metabolic panel [[id number]] Collected:  07/01/19 2250    Lab Status:  Final result Specimen:  Blood from Arm, Right Updated:  07/01/19 2312     Sodium 143 mmol/L      Potassium 3 7 mmol/L      Chloride 106 mmol/L      CO2 30 mmol/L      ANION GAP 7 mmol/L      BUN 18 mg/dL      Creatinine 1 04 mg/dL      Glucose 85 mg/dL      Calcium 9 3 mg/dL      AST 30 U/L      ALT 29 U/L      Alkaline Phosphatase 86 U/L      Total Protein 8 0 g/dL      Albumin 3 7 g/dL      Total Bilirubin 0 20 mg/dL      eGFR 71 ml/min/1 73sq m     Narrative:       [place] guidelines for Chronic Kidney Disease (CKD):     Stage 1 with normal or high GFR (GFR > 90 mL/min/1 73 square meters)    Stage 2 Mild CKD (GFR = 60-89 mL/min/1 73 square meters)    Stage 3A Moderate CKD (GFR = 45-59 mL/min/1 73 square meters)    Stage 3B Moderate CKD (GFR = 30-44 mL/min/1 73 square meters)    Stage 4 Severe CKD (GFR = 15-29 mL/min/1 73 square meters)    Stage 5 End Stage CKD (GFR <15 mL/min/1 73 square meters)  Note: GFR calculation is accurate only with a steady state creatinine    CBC and differential [[id number]]  (Abnormal) Collected:  07/01/19 2250    Lab Status:  Final result Specimen:  Blood from Arm, Right Updated:  07/01/19 2256     WBC 6 67 Thousand/uL      RBC 4 38 Million/uL      Hemoglobin 12 2 g/dL      Hematocrit 37 8 %      MCV 86 fL      MCH 27 9 pg      MCHC 32 3 g/dL      RDW 14 1 %      MPV 12 0 fL      Platelets 968 Thousands/uL      nRBC 0 /100 WBCs      Neutrophils Relative 32 %      Immat GRANS % 0 %      Lymphocytes Relative 57 %      Monocytes Relative 8 %      Eosinophils Relative 2 %      Basophils Relative 1 %      Neutrophils Absolute 2 11 Thousands/µL      Immature Grans Absolute 0 01 Thousand/uL      Lymphocytes Absolute 3 81 Thousands/µL      Monocytes Absolute 0 54 Thousand/µL      Eosinophils Absolute 0 16 Thousand/µL      Basophils Absolute 0 04 Thousands/µL                  CT head wo contrast   Final Result by Srini Brooks DO (07/01 2330)      No acute intracranial abnormality  Large mass in the sellar region similar to prior study                    Workstation performed: [id number]                    Procedures  ECG 12 Lead Documentation Only  Date/Time: 7/2/2019 1:27 AM  Performed by: Alycia Mclean PA-C  Authorized by: Alycia Mclean PA-C     Indications / Diagnosis:  Dizziness  ECG reviewed by me, the ED Provider: yes    Patient location:  ED  Previous ECG:     Previous ECG:  Compared to current    Similarity:  No change  Interpretation:     Interpretation: abnormal    Rate:     ECG rate:  71    ECG rate assessment: normal    Rhythm:     Rhythm: sinus rhythm    QRS:     QRS axis:  Normal  T waves:     T waves: inverted      Inverted:  V4, V5 and V6  Other findings:     Other findings: LVH             ED Course         HEART Risk Score      Most Recent Value   History  0 Filed at: 07/02/2019 0001   ECG  1 Filed at: 07/02/2019 0001   Age  1 Filed at: 07/02/2019 0001   Risk Factors  1 Filed at: 07/02/2019 0001   Troponin  0 Filed at: 07/02/2019 0001   Heart Score Risk Calculator   History  0 Filed at: 07/02/2019 0001   ECG  1 Filed at: 07/02/2019 0001   Age  1 Filed at: 07/02/2019 0001   Risk Factors  1 Filed at: 07/02/2019 0001   Troponin  0 Filed at: 07/02/2019 0001   HEART Score  3 Filed at: 07/02/2019 0001   HEART Score  3 Filed at: 07/02/2019 0001                            MDM  Number of Diagnoses or Management Options  Dizziness:   Diagnosis management comments: Patient is a 60-year-old female presents emergency department complaints of dizziness  Patient has a known pituitary adenoma  It was recommended to have this surgically excised  Patient has not had this performed  It was also felt that her symptoms were related to this tumor  Patient has been admitted recently for same complaints  She underwent cardiac evaluation which was found have a negative stress test     Patient is low-risk for ACS by the HEART score and has approx 1 7-2 5% risk of MACE in the next 30d given the negative EKG and initial cardiac troponin  I discussed with the patient the option of obtaining a repeat cardiac troponin and EKG at the t+3 hr time point  The patient declined stating that he/she was comfortable with the risk of 30d MACE as described  Patient was advised to follow with PMD as soon as possible for further evaluation for additional investigation; ED return precautions regarding chest pain symptoms concerning for ACS were discussed (e g , dyspnea, exertional pain, radiation of pain to shoulders, diaphoresis, vomiting)  Patient expressed understanding and agreed to plan  KI Pedroza, LORETTA Cason, and HYACINTH Saab  2008  Chest pain in the emergency room: value of the HEART score  [place]) [facility] heart journal?: monthly journal of the [place] of Cardiology and the [employer], no  6      [place], B E, A J Six, HYACINTH Saab, M NITZA Cotton, KI Page R ABRAHAM Gonzalez, et al  2013  A prospective validation of the HEART score for chest pain patients at the emergency department  International journal of cardiology, no  3 (March 7)  doi:10 1016/j ijcard  2013 01 255  Recommend patient follow up with family physician for further evaluation and treatment     Return parameters were discussed length  Patient stable for discharge           Amount and/or Complexity of Data Reviewed  Clinical lab tests: ordered and reviewed  Tests in the radiology section of CPT®: ordered and reviewed  Independent visualization of images, tracings, or specimens: yes    Risk of Complications, Morbidity, and/or Mortality  Presenting problems: moderate  Diagnostic procedures: moderate  Management options: moderate        Disposition  Final diagnoses:   Dizziness     Time reflects when diagnosis was documented in both MDM as applicable and the Disposition within this note     Time User Action Codes Description Comment    7/2/2019 12:00 AM Blakebaljeet Kenneth Add [R42] Dizziness       ED Disposition     ED Disposition Condition Date/Time Comment    Discharge Good Tue Jul 2, 2019 [address] discharge to home/self care  Follow-up Information     Follow up With Specialties Details Why Contact Info    Good [place], [address] [facility], Nurse Practitioner Schedule an appointment as soon as possible for a visit   [address] [facility],[address]  [place] [address]  [phone]            Discharge Medication List as of 7/2/2019 12:01 AM      START taking these medications    Details   meclizine (ANTIVERT) 12 5 MG tablet Take 2 tablets (25 mg total) by mouth 3 (three) times a day as needed for dizziness, Starting Tue 7/2/2019, Print         CONTINUE these medications which have NOT CHANGED    Details   aspirin 81 mg chewable tablet Chew 81 mg daily, Historical Med      atorvastatin (LIPITOR) 20 mg tablet Take 20 mg by mouth daily, Historical Med      hydrochlorothiazide (HYDRODIURIL) 12 5 mg tablet Take 12 5 mg by mouth daily, Historical Med      metoprolol-hydrochlorothiazide (LOPRESSOR HCT) 100-25 MG per tablet Take 1 tablet by mouth daily, Historical Med           No discharge procedures on file      ED Provider  Electronically Signed by           Dayan Shannon PA-C  07/02/19 0136

## 2019-08-06 ENCOUNTER — OFFICE VISIT (OUTPATIENT)
Dept: FAMILY MEDICINE CLINIC | Facility: CLINIC | Age: 54
End: 2019-08-06
Payer: COMMERCIAL

## 2019-08-06 ENCOUNTER — APPOINTMENT (OUTPATIENT)
Dept: LAB | Facility: CLINIC | Age: 54
End: 2019-08-06
Payer: COMMERCIAL

## 2019-08-06 ENCOUNTER — TRANSCRIBE ORDERS (OUTPATIENT)
Dept: LAB | Facility: CLINIC | Age: 54
End: 2019-08-06

## 2019-08-06 VITALS
HEIGHT: 63 IN | WEIGHT: 182 LBS | HEART RATE: 62 BPM | DIASTOLIC BLOOD PRESSURE: 86 MMHG | BODY MASS INDEX: 32.25 KG/M2 | TEMPERATURE: 97.4 F | OXYGEN SATURATION: 98 % | SYSTOLIC BLOOD PRESSURE: 134 MMHG

## 2019-08-06 DIAGNOSIS — R73.9 HYPERGLYCEMIA: ICD-10-CM

## 2019-08-06 DIAGNOSIS — Z13.6 SCREENING FOR CARDIOVASCULAR CONDITION: ICD-10-CM

## 2019-08-06 DIAGNOSIS — D49.7 PITUITARY TUMOR: ICD-10-CM

## 2019-08-06 DIAGNOSIS — E78.00 HYPERCHOLESTEROLEMIA: Primary | ICD-10-CM

## 2019-08-06 DIAGNOSIS — Z12.11 SCREENING FOR COLON CANCER: ICD-10-CM

## 2019-08-06 DIAGNOSIS — E78.00 HYPERCHOLESTEROLEMIA: ICD-10-CM

## 2019-08-06 DIAGNOSIS — Z12.39 SCREENING FOR BREAST CANCER: ICD-10-CM

## 2019-08-06 LAB
ALBUMIN SERPL BCP-MCNC: 4.1 G/DL (ref 3.5–5)
ALP SERPL-CCNC: 81 U/L (ref 46–116)
ALT SERPL W P-5'-P-CCNC: 44 U/L (ref 12–78)
ANION GAP SERPL CALCULATED.3IONS-SCNC: 6 MMOL/L (ref 4–13)
AST SERPL W P-5'-P-CCNC: 29 U/L (ref 5–45)
BASOPHILS # BLD AUTO: 0.03 THOUSANDS/ΜL (ref 0–0.1)
BASOPHILS NFR BLD AUTO: 1 % (ref 0–1)
BILIRUB SERPL-MCNC: 0.32 MG/DL (ref 0.2–1)
BUN SERPL-MCNC: 17 MG/DL (ref 5–25)
CALCIUM SERPL-MCNC: 9.1 MG/DL (ref 8.3–10.1)
CHLORIDE SERPL-SCNC: 109 MMOL/L (ref 100–108)
CHOLEST SERPL-MCNC: 213 MG/DL (ref 50–200)
CO2 SERPL-SCNC: 29 MMOL/L (ref 21–32)
CREAT SERPL-MCNC: 1.08 MG/DL (ref 0.6–1.3)
EOSINOPHIL # BLD AUTO: 0.12 THOUSAND/ΜL (ref 0–0.61)
EOSINOPHIL NFR BLD AUTO: 2 % (ref 0–6)
ERYTHROCYTE [DISTWIDTH] IN BLOOD BY AUTOMATED COUNT: 15 % (ref 11.6–15.1)
EST. AVERAGE GLUCOSE BLD GHB EST-MCNC: 128 MG/DL
GFR SERPL CREATININE-BSD FRML MDRD: 68 ML/MIN/1.73SQ M
GLUCOSE P FAST SERPL-MCNC: 87 MG/DL (ref 65–99)
HBA1C MFR BLD: 6.1 % (ref 4.2–6.3)
HCT VFR BLD AUTO: 42.3 % (ref 34.8–46.1)
HDLC SERPL-MCNC: 60 MG/DL (ref 40–60)
HGB BLD-MCNC: 12.7 G/DL (ref 11.5–15.4)
IMM GRANULOCYTES # BLD AUTO: 0.02 THOUSAND/UL (ref 0–0.2)
IMM GRANULOCYTES NFR BLD AUTO: 0 % (ref 0–2)
LDLC SERPL CALC-MCNC: 144 MG/DL (ref 0–100)
LYMPHOCYTES # BLD AUTO: 2.95 THOUSANDS/ΜL (ref 0.6–4.47)
LYMPHOCYTES NFR BLD AUTO: 56 % (ref 14–44)
MCH RBC QN AUTO: 27.1 PG (ref 26.8–34.3)
MCHC RBC AUTO-ENTMCNC: 30 G/DL (ref 31.4–37.4)
MCV RBC AUTO: 90 FL (ref 82–98)
MONOCYTES # BLD AUTO: 0.35 THOUSAND/ΜL (ref 0.17–1.22)
MONOCYTES NFR BLD AUTO: 7 % (ref 4–12)
NEUTROPHILS # BLD AUTO: 1.82 THOUSANDS/ΜL (ref 1.85–7.62)
NEUTS SEG NFR BLD AUTO: 34 % (ref 43–75)
NONHDLC SERPL-MCNC: 153 MG/DL
NRBC BLD AUTO-RTO: 0 /100 WBCS
PLATELET # BLD AUTO: 237 THOUSANDS/UL (ref 149–390)
PMV BLD AUTO: 12.7 FL (ref 8.9–12.7)
POTASSIUM SERPL-SCNC: 4 MMOL/L (ref 3.5–5.3)
PROT SERPL-MCNC: 8.1 G/DL (ref 6.4–8.2)
RBC # BLD AUTO: 4.69 MILLION/UL (ref 3.81–5.12)
SODIUM SERPL-SCNC: 144 MMOL/L (ref 136–145)
TRIGL SERPL-MCNC: 45 MG/DL
TSH SERPL DL<=0.05 MIU/L-ACNC: 0.77 UIU/ML (ref 0.36–3.74)
WBC # BLD AUTO: 5.29 THOUSAND/UL (ref 4.31–10.16)

## 2019-08-06 PROCEDURE — 80053 COMPREHEN METABOLIC PANEL: CPT

## 2019-08-06 PROCEDURE — 83036 HEMOGLOBIN GLYCOSYLATED A1C: CPT

## 2019-08-06 PROCEDURE — 84681 ASSAY OF C-PEPTIDE: CPT

## 2019-08-06 PROCEDURE — 84443 ASSAY THYROID STIM HORMONE: CPT

## 2019-08-06 PROCEDURE — 36415 COLL VENOUS BLD VENIPUNCTURE: CPT

## 2019-08-06 PROCEDURE — 85025 COMPLETE CBC W/AUTO DIFF WBC: CPT

## 2019-08-06 PROCEDURE — 99214 OFFICE O/P EST MOD 30 MIN: CPT | Performed by: FAMILY MEDICINE

## 2019-08-06 PROCEDURE — 80061 LIPID PANEL: CPT

## 2019-08-06 NOTE — PROGRESS NOTES
Assessment/Plan:    No problem-specific Assessment & Plan notes found for this encounter  Diagnoses and all orders for this visit:    Hypercholesterolemia  -     Comprehensive metabolic panel; Future  -     Lipid panel; Future    Hyperglycemia  -     C-peptide; Future  -     Glucose, fasting; Future  -     Hemoglobin A1C; Future    Pituitary tumor  -     Ambulatory referral to Neurosurgery; Future    Screening for cardiovascular condition  -     CBC and differential; Future  -     Comprehensive metabolic panel; Future  -     Lipid panel; Future  -     TSH, 3rd generation with Free T4 reflex; Future    Screening for colon cancer  -     Cologuard; Future    Screening for breast cancer  -     Mammo screening bilateral w 3d & cad; Future          PHQ-9 Depression Screening    PHQ-9:    Frequency of the following problems over the past two weeks:       Little interest or pleasure in doing things:  0 - not at all  Feeling down, depressed, or hopeless:  0 - not at all  PHQ-2 Score:  0        BMI Counseling: Body mass index is 32 24 kg/m²  Discussed the patient's BMI with her  The BMI is above average  BMI counseling and education was provided to the patient  Nutrition recommendations include reducing portion sizes and 3-5 servings of fruits/vegetables daily  Exercise recommendations include moderate aerobic physical activity for 150 minutes/week and exercising 3-5 times per week  Subjective:      Patient ID: Latoya Ambrocio is a 49 y o  female  New pt here to establish with history of pituitary tumor, pt gets spells of lightheadedness, HTN, hypercholesterol, surgery to remove pituitary ademnoma, 1 , non smoker, occa EtoH, no illegal drugs, unemployed, single, one child 22 y o        The following portions of the patient's history were reviewed and updated as appropriate: allergies, current medications, past family history, past medical history, past social history, past surgical history and problem list     Review of Systems   Constitutional: Positive for fatigue  Negative for chills and fever  HENT: Negative  Eyes: Negative  Negative for visual disturbance  Respiratory: Negative for shortness of breath and wheezing  Cardiovascular: Negative for chest pain and palpitations  Gastrointestinal: Negative for abdominal pain, blood in stool, constipation, diarrhea, nausea and vomiting  Endocrine: Negative  Genitourinary: Negative for difficulty urinating and dysuria  Musculoskeletal: Negative for arthralgias and myalgias  Skin: Negative  Allergic/Immunologic: Negative  Neurological: Negative for seizures and syncope  Hematological: Negative for adenopathy  Psychiatric/Behavioral: Negative  Objective:    /86   Pulse 62   Temp (!) 97 4 °F (36 3 °C) (Tympanic)   Ht 5' 3" (1 6 m)   Wt 82 6 kg (182 lb)   SpO2 98%   BMI 32 24 kg/m²      Physical Exam   Constitutional: She is oriented to person, place, and time  She appears well-developed and well-nourished  HENT:   Head: Normocephalic and atraumatic  Right Ear: External ear normal    Left Ear: External ear normal    Nose: Nose normal    Mouth/Throat: Oropharynx is clear and moist    Eyes: Pupils are equal, round, and reactive to light  Conjunctivae and EOM are normal    Neck: Normal range of motion  Neck supple  Cardiovascular: Normal rate, regular rhythm and normal heart sounds  No murmur heard  Pulmonary/Chest: Effort normal and breath sounds normal  No respiratory distress  She has no wheezes  She has no rales  Abdominal: Soft  Bowel sounds are normal  She exhibits no distension and no mass  There is no tenderness  There is no rebound and no guarding  Musculoskeletal: Normal range of motion  She exhibits no edema  Lymphadenopathy:     She has no cervical adenopathy  Neurological: She is alert and oriented to person, place, and time  She has normal reflexes  She exhibits normal muscle tone     Skin: Skin is warm and dry  Psychiatric: She has a normal mood and affect  Her behavior is normal  Judgment and thought content normal    Nursing note and vitals reviewed

## 2019-08-07 LAB — C PEPTIDE SERPL-MCNC: 3.7 NG/ML (ref 1.1–4.4)

## 2019-08-20 ENCOUNTER — OFFICE VISIT (OUTPATIENT)
Dept: FAMILY MEDICINE CLINIC | Facility: CLINIC | Age: 54
End: 2019-08-20
Payer: COMMERCIAL

## 2019-08-20 VITALS
TEMPERATURE: 98.7 F | BODY MASS INDEX: 32.82 KG/M2 | DIASTOLIC BLOOD PRESSURE: 92 MMHG | HEIGHT: 63 IN | SYSTOLIC BLOOD PRESSURE: 164 MMHG | HEART RATE: 64 BPM | WEIGHT: 185.2 LBS | OXYGEN SATURATION: 97 %

## 2019-08-20 DIAGNOSIS — Z00.00 ANNUAL PHYSICAL EXAM: Primary | ICD-10-CM

## 2019-08-20 DIAGNOSIS — R94.4 DECREASED CALCULATED GFR: ICD-10-CM

## 2019-08-20 DIAGNOSIS — R42 DIZZINESS: ICD-10-CM

## 2019-08-20 DIAGNOSIS — E04.9 ENLARGED THYROID: ICD-10-CM

## 2019-08-20 DIAGNOSIS — I10 ESSENTIAL HYPERTENSION: ICD-10-CM

## 2019-08-20 DIAGNOSIS — E78.00 HYPERCHOLESTEROLEMIA: ICD-10-CM

## 2019-08-20 PROCEDURE — 99396 PREV VISIT EST AGE 40-64: CPT | Performed by: FAMILY MEDICINE

## 2019-08-20 RX ORDER — MECLIZINE HCL 12.5 MG/1
25 TABLET ORAL 3 TIMES DAILY PRN
Qty: 30 TABLET | Refills: 0 | Status: SHIPPED | OUTPATIENT
Start: 2019-08-20 | End: 2020-06-26 | Stop reason: ALTCHOICE

## 2019-08-20 NOTE — PROGRESS NOTES
Assessment/Plan:    No problem-specific Assessment & Plan notes found for this encounter  Diagnoses and all orders for this visit:    Annual physical exam    Hypercholesterolemia    Essential hypertension    Decreased calculated GFR    Dizziness          PHQ-9 Depression Screening    PHQ-9:    Frequency of the following problems over the past two weeks:       Little interest or pleasure in doing things:  0 - not at all  Feeling down, depressed, or hopeless:  0 - not at all  PHQ-2 Score:  0            Subjective:      Patient ID: Ghazal Nails is a 49 y o  female  Pt here for annual physical,       The following portions of the patient's history were reviewed and updated as appropriate: allergies, current medications, past family history, past medical history, past social history, past surgical history and problem list     Review of Systems   Constitutional: Negative for chills, fatigue and fever  HENT: Negative  Eyes: Negative  Respiratory: Negative for shortness of breath and wheezing  Cardiovascular: Negative for chest pain and palpitations  Gastrointestinal: Negative for abdominal pain, blood in stool, constipation, diarrhea, nausea and vomiting  Endocrine: Negative  Genitourinary: Negative for difficulty urinating and dysuria  Musculoskeletal: Negative for arthralgias and myalgias  Skin: Negative  Allergic/Immunologic: Negative  Neurological: Negative for seizures and syncope  Hematological: Negative for adenopathy  Psychiatric/Behavioral: Negative  Objective:    /92   Pulse 64   Temp 98 7 °F (37 1 °C) (Tympanic)   Ht 5' 3" (1 6 m)   Wt 84 kg (185 lb 3 2 oz)   SpO2 97%   BMI 32 81 kg/m²      Physical Exam   Constitutional: She is oriented to person, place, and time  She appears well-developed and well-nourished  HENT:   Head: Normocephalic and atraumatic     Right Ear: External ear normal    Left Ear: External ear normal    Nose: Nose normal  Mouth/Throat: Oropharynx is clear and moist    Eyes: Pupils are equal, round, and reactive to light  Conjunctivae and EOM are normal    Neck: Normal range of motion  Neck supple  Cardiovascular: Normal rate, regular rhythm and normal heart sounds  No murmur heard  Pulmonary/Chest: Effort normal and breath sounds normal  No respiratory distress  She has no wheezes  She has no rales  Abdominal: Soft  Bowel sounds are normal  She exhibits no distension and no mass  There is no tenderness  There is no rebound and no guarding  Musculoskeletal: Normal range of motion  She exhibits no edema  Neurological: She is alert and oriented to person, place, and time  She has normal reflexes  She exhibits normal muscle tone  Skin: Skin is warm and dry  Psychiatric: She has a normal mood and affect  Her behavior is normal  Judgment and thought content normal    Nursing note and vitals reviewed

## 2019-08-26 ENCOUNTER — HOSPITAL ENCOUNTER (OUTPATIENT)
Dept: ULTRASOUND IMAGING | Facility: HOSPITAL | Age: 54
Discharge: HOME | End: 2019-08-26
Attending: FAMILY MEDICINE
Payer: COMMERCIAL

## 2019-08-26 ENCOUNTER — CONSULT (OUTPATIENT)
Dept: NEUROSURGERY | Facility: CLINIC | Age: 54
End: 2019-08-26
Payer: COMMERCIAL

## 2019-08-26 ENCOUNTER — HOSPITAL ENCOUNTER (OUTPATIENT)
Dept: MAMMOGRAPHY | Facility: HOSPITAL | Age: 54
Discharge: HOME | End: 2019-08-26
Attending: FAMILY MEDICINE
Payer: COMMERCIAL

## 2019-08-26 VITALS
BODY MASS INDEX: 32.85 KG/M2 | TEMPERATURE: 97.2 F | DIASTOLIC BLOOD PRESSURE: 102 MMHG | RESPIRATION RATE: 16 BRPM | HEIGHT: 63 IN | HEART RATE: 70 BPM | SYSTOLIC BLOOD PRESSURE: 161 MMHG | WEIGHT: 185.4 LBS

## 2019-08-26 VITALS — BODY MASS INDEX: 32.78 KG/M2 | WEIGHT: 185 LBS | HEIGHT: 63 IN

## 2019-08-26 DIAGNOSIS — D49.7 PITUITARY TUMOR: Primary | Chronic | ICD-10-CM

## 2019-08-26 DIAGNOSIS — E04.9 ENLARGED THYROID: ICD-10-CM

## 2019-08-26 DIAGNOSIS — Z12.39 SCREENING FOR BREAST CANCER: ICD-10-CM

## 2019-08-26 DIAGNOSIS — H47.49 COMPRESSION OF OPTIC CHIASM: ICD-10-CM

## 2019-08-26 PROCEDURE — 77067 SCR MAMMO BI INCL CAD: CPT

## 2019-08-26 PROCEDURE — 99245 OFF/OP CONSLTJ NEW/EST HI 55: CPT | Performed by: NEUROLOGICAL SURGERY

## 2019-08-26 PROCEDURE — 76536 US EXAM OF HEAD AND NECK: CPT

## 2019-08-26 PROCEDURE — 77063 BREAST TOMOSYNTHESIS BI: CPT

## 2019-08-26 NOTE — PROGRESS NOTES
Neurosurgery Office Note  Ghazal Nails 49 y o  female MRN: [id number]      Assessment/Plan      Diagnoses and all orders for this visit:    Pituitary tumor  -     Ambulatory Referral to Ophthalmology; Future  -     Ambulatory Referral to Otolaryngology; Future  -     CTA head w wo contrast; Future  -     Ambulatory referral to Neurosurgery    Compression of optic chiasm  -     Ambulatory Referral to Ophthalmology; Future  -     Ambulatory Referral to Otolaryngology; Future  -     CTA head w wo contrast; Future    Other orders  -     Cancel: MRI brain pituitary wo and w contrast; Future  -     Cancel: Growth hormone; Future          Discussion:      72-year-old woman who referred by her PCP,  Dr Violeta Hall, for recurrent pituitary adenoma  patient relates she underwent transvenous, transnasal surgery with Dr Sarthak Phillips  In 2004 at [address] [place] [address]  [facility]  Suggest this was a growth hormone /10 at trophic tumor, but there is no evidence of this clinically or in her lab values  She since that time had been following at [facility], and there was mention in 2017 of recurrence, but the patient deferred on re-resection  She has changed insurance recently, was seen by her PCP  The patient was sent for  Blood testing, pituitary panel, and this all appears to be normal   There is no evidence of elevated IGF 1  She is not acromegalic on exam       MRI scan pituitary done 5/1/19 shows considerable mass within the sella, 2 6 cm x 1 9 cm x 1 5 cm abutting and displacing the optic chiasm  On my exam, her visual fields are intact to confrontation  She states she was seen by [facility] in 03/2019 but we do not have those results  I reviewed the patient options for management  I did not recommend expectant/ watch and wait management, as this will likely progress and cause visual decline in this young woman   As this is not a prolactin secreting tumor, medical management is not an effective option  Radiation therapy would need to be in IMRT format,  And is not ideal with optic chiasm compression  Resection I recommended is her best option  We did not extensively discuss expected perioperative experience, attendant risks etc   Will need to do this on her return  Firstly, however, I recommended she undergo a CTA to better evaluate the skull base, prior surgical avenue, for planning from an ENT standpoint  In addition, I referred her to Dr Mulu Josue  Lastly, referred to Dr Jose Alfredo Bravo of Ophthalmology for formal visual fields and assessment  I will see her back after these evaluations are completed  That point we can discuss timing and details of surgery  CHIEF COMPLAINT    Chief Complaint   Patient presents with    Consult    Brain Tumor       HISTORY    History of Present Illness     49y o  year old female     HPI    See Discussion    REVIEW OF SYSTEMS    Review of Systems   Constitutional: Negative  HENT: Negative  Eyes: Positive for visual disturbance (blurred vision in right eye)  Respiratory: Negative  Cardiovascular: Negative  Gastrointestinal: Negative  Endocrine: Negative  Genitourinary: Negative  Musculoskeletal: Positive for gait problem (a little off balance)  Muscle pain in the shoulders and upper back   Skin: Negative  Allergic/Immunologic: Negative  Neurological: Positive for light-headedness and headaches (intermittent on the top right with tenderness)  Hematological: Negative  Psychiatric/Behavioral: Negative  All other systems reviewed and are negative          Meds/Allergies     Current Outpatient Medications   Medication Sig Dispense Refill    aspirin 81 mg chewable tablet Chew 81 mg daily      atorvastatin (LIPITOR) 20 mg tablet Take 20 mg by mouth daily      hydrochlorothiazide (HYDRODIURIL) 12 5 mg tablet Take 12 5 mg by mouth daily      meclizine (ANTIVERT) 12 5 MG tablet Take 2 tablets (25 mg total) by mouth 3 (three) times a day as needed for dizziness 30 tablet 0    metoprolol-hydrochlorothiazide (LOPRESSOR HCT) 100-25 MG per tablet Take 1 tablet by mouth daily       No current facility-administered medications for this visit  No Known Allergies    PAST HISTORY    Past Medical History:   Diagnosis Date    Hypercholesteremia     Hypertension     Pituitary tumor        Past Surgical History:   Procedure Laterality Date     SECTION      TRANSPHENOIDAL / TRANSNASAL HYPOPHYSECTOMY / RESECTION PITUITARY TUMOR         Social History     Tobacco Use    Smoking status: Never Smoker    Smokeless tobacco: Never Used   Substance Use Topics    Alcohol use: Not Currently     Frequency: Monthly or less    Drug use: Not Currently       Family History   Problem Relation Age of Onset    Cancer Mother     Heart disease Father     Hypertension Father     No Known Problems Daughter     No Known Problems Maternal Aunt          Above history personally reviewed  EXAM    Vitals:Blood pressure (!) 161/102, pulse 70, temperature (!) 97 2 °F (36 2 °C), temperature source Tympanic, resp  rate 16, height 5' 3" (1 6 m), weight 84 1 kg (185 lb 6 4 oz)  ,Body mass index is 32 84 kg/m²  Physical Exam   Constitutional: She is oriented to person, place, and time  She appears well-developed and well-nourished  HENT:   Head: Normocephalic  Eyes: No scleral icterus  Neck: Neck supple  Cardiovascular: Normal rate  Pulmonary/Chest: Effort normal    Abdominal: Soft  Neurological: She is alert and oriented to person, place, and time  GCS eye subscore is 4  GCS verbal subscore is 5  GCS motor subscore is 6  Skin: Skin is warm and dry  Psychiatric: She has a normal mood and affect  Her speech is normal and behavior is normal    Vitals reviewed  Neurologic Exam     Mental Status   Oriented to person, place, and time     Attention: normal    Speech: speech is normal   Level of consciousness: alert    MOCA 26/30       Cranial Nerves     CN II   Right visual field deficit: none  Left visual field deficit: none     CN VII   Facial expression full, symmetric  Motor Exam   Muscle bulk: normal  Overall muscle tone: normal  Moves all extremities, grossly normal     Gait, Coordination, and Reflexes     Tremor   Resting tremor: absent  Intention tremor: absent  Action tremor: absent  No aids  MEDICAL DECISION MAKING    Imaging Studies:     IMPRESSION:  Approximate 2 6 cm pituitary macroadenoma with sellar, suprasellar and small intrasinus components as noted above  Bony remodeling and erosion as described above      Mass effect an displacement of the optic chiasm  pituitary stalk and gland as described left        ENT consultation is recommended        I have personally reviewed pertinent reports     and I have personally reviewed pertinent films in PACS      Lab Studies:    Lab Results   Component Value Date    PROLACTIN 6 1 05/03/2019    CALCITONIN 165 05/03/2019    [id number] 0 771 08/06/2019    FREET4 0 95 05/03/2019    FSH 52 9 05/03/2019    LH 20 2 05/03/2019     AM Cortisol 20 - 5/3/19 7:29AM

## 2019-08-31 ENCOUNTER — HOSPITAL ENCOUNTER (OUTPATIENT)
Dept: CT IMAGING | Facility: HOSPITAL | Age: 54
Discharge: HOME | End: 2019-08-31
Payer: COMMERCIAL

## 2019-08-31 DIAGNOSIS — D49.7 PITUITARY TUMOR: ICD-10-CM

## 2019-08-31 DIAGNOSIS — H47.49 COMPRESSION OF OPTIC CHIASM: ICD-10-CM

## 2019-08-31 PROCEDURE — 70496 CT ANGIOGRAPHY HEAD: CPT

## 2019-08-31 RX ADMIN — IOHEXOL 85 ML: 350 INJECTION, SOLUTION INTRAVENOUS at 09:28

## 2019-09-05 ENCOUNTER — TELEPHONE (OUTPATIENT)
Dept: NEUROSURGERY | Facility: CLINIC | Age: 54
End: 2019-09-05

## 2019-09-05 NOTE — TELEPHONE ENCOUNTER
----- Message from Sadie Lugo MD sent at 9/5/2019 10:37 AM EDT -----  Regarding: RE: follow up  Would prefer to see her after completing ophtho/FVF  Thanks    ----- Message -----  From: Lianajones Avalos  Sent: 9/5/2019  10:33 AM EDT  To: Sadie Lugo MD  Subject: follow up                                        Patient has completed CTA and office visit with Dr Sheridan Fernando  She has not completed FVF / OCT with Dr Roddy Goodell yet  Ok to keep 9/6 appt to review study or need to reschedule for after Ophtho?

## 2019-09-05 NOTE — TELEPHONE ENCOUNTER
Called and spoke to patient regarding the missed appointment with [facility] / Dr Roddy Goodell  Patient states she is having insurance issues and is not able to reschedule her appointment with them yet  We have agreed to cancel her appointment for tomorrow and she understands to call our office to reschedule once she is able to complete the requested testing with [facility]

## 2019-09-17 ENCOUNTER — OFFICE VISIT (OUTPATIENT)
Dept: FAMILY MEDICINE CLINIC | Facility: CLINIC | Age: 54
End: 2019-09-17

## 2019-09-17 VITALS
TEMPERATURE: 99.1 F | SYSTOLIC BLOOD PRESSURE: 140 MMHG | WEIGHT: 190 LBS | BODY MASS INDEX: 33.66 KG/M2 | DIASTOLIC BLOOD PRESSURE: 100 MMHG | HEIGHT: 63 IN

## 2019-09-17 DIAGNOSIS — E78.00 HYPERCHOLESTEROLEMIA: Primary | ICD-10-CM

## 2019-09-17 DIAGNOSIS — D49.7 PITUITARY TUMOR: ICD-10-CM

## 2019-09-17 DIAGNOSIS — K21.9 GASTROESOPHAGEAL REFLUX DISEASE WITHOUT ESOPHAGITIS: ICD-10-CM

## 2019-09-17 DIAGNOSIS — E04.1 THYROID NODULE: ICD-10-CM

## 2019-09-17 PROCEDURE — 99214 OFFICE O/P EST MOD 30 MIN: CPT | Performed by: FAMILY MEDICINE

## 2019-09-17 RX ORDER — METOPROLOL SUCCINATE 100 MG/1
100 TABLET, EXTENDED RELEASE ORAL DAILY
COMMUNITY
Start: 2019-09-12 | End: 2020-05-14 | Stop reason: SDUPTHER

## 2019-09-17 NOTE — PROGRESS NOTES
Assessment/Plan:    No problem-specific Assessment & Plan notes found for this encounter  Diagnoses and all orders for this visit:    Hypercholesterolemia    Thyroid nodule    Pituitary tumor  Comments:  surgery has been recomended    Gastroesophageal reflux disease without esophagitis    Other orders  -     metoprolol succinate (TOPROL-XL) 100 mg 24 hr tablet          PHQ-9 Depression Screening    PHQ-9:    Frequency of the following problems over the past two weeks:       Little interest or pleasure in doing things:  0 - not at all  Feeling down, depressed, or hopeless:  0 - not at all  PHQ-2 Score:  0            Subjective:      Patient ID: Ghazal Nails is a 49 y o  female  Follow up for thyroid nodule which has improved, follow up for pititary tumor pt saw ENT, follow up for globus sensation pt dx with acid reflux and given medication    Hyperlipidemia   This is a chronic problem  The current episode started more than 1 year ago  Pertinent negatives include no chest pain or myalgias  The following portions of the patient's history were reviewed and updated as appropriate: allergies, current medications, past family history, past medical history, past social history, past surgical history and problem list     Review of Systems   Cardiovascular: Negative for chest pain and palpitations  Gastrointestinal: Negative for abdominal distention, nausea and vomiting  Musculoskeletal: Negative for myalgias  Objective:    /100   Temp 99 1 °F (37 3 °C)   Ht 5' 3" (1 6 m)   Wt 86 2 kg (190 lb)   BMI 33 66 kg/m²      Physical Exam   Constitutional: She is oriented to person, place, and time  She appears well-developed and well-nourished  No distress  HENT:   Head: Normocephalic and atraumatic  Eyes: No scleral icterus  Neck: Normal range of motion  Neck supple  Cardiovascular: Normal rate, regular rhythm and normal heart sounds  Exam reveals no gallop and no friction rub     No murmur heard  Pulmonary/Chest: Effort normal and breath sounds normal  No stridor  No respiratory distress  She has no wheezes  She has no rales  Abdominal: Soft  Bowel sounds are normal  She exhibits no distension and no mass  There is no tenderness  There is no rebound and no guarding  Lymphadenopathy:     She has no cervical adenopathy  Neurological: She is alert and oriented to person, place, and time  Skin: Skin is warm and dry  She is not diaphoretic  Psychiatric: She has a normal mood and affect  Her behavior is normal  Judgment and thought content normal    Nursing note and vitals reviewed

## 2019-09-18 DIAGNOSIS — Z11.1 SCREENING FOR TUBERCULOSIS: Primary | ICD-10-CM

## 2019-09-19 ENCOUNTER — APPOINTMENT (OUTPATIENT)
Dept: LAB | Facility: CLINIC | Age: 54
End: 2019-09-19
Payer: COMMERCIAL

## 2019-09-19 ENCOUNTER — TRANSCRIBE ORDERS (OUTPATIENT)
Dept: LAB | Facility: CLINIC | Age: 54
End: 2019-09-19

## 2019-09-19 DIAGNOSIS — Z11.1 SCREENING FOR TUBERCULOSIS: ICD-10-CM

## 2019-09-19 DIAGNOSIS — E78.00 HYPERCHOLESTEROLEMIA: ICD-10-CM

## 2019-09-19 PROCEDURE — 36415 COLL VENOUS BLD VENIPUNCTURE: CPT

## 2019-09-19 PROCEDURE — 86480 TB TEST CELL IMMUN MEASURE: CPT

## 2019-09-23 LAB
GAMMA INTERFERON BACKGROUND BLD IA-ACNC: 0.09 IU/ML
M TB IFN-G BLD-IMP: NEGATIVE
M TB IFN-G CD4+ BCKGRND COR BLD-ACNC: 0.03 IU/ML
M TB IFN-G CD4+ BCKGRND COR BLD-ACNC: 0.08 IU/ML
MITOGEN IGNF BCKGRD COR BLD-ACNC: >10 IU/ML

## 2019-10-03 ENCOUNTER — DOCUMENTATION (OUTPATIENT)
Dept: NEUROSURGERY | Facility: CLINIC | Age: 54
End: 2019-10-03

## 2019-10-03 NOTE — PROGRESS NOTES
09 26 2019 Consult Note, Formal Visual Fields, Optic Nerve Tomography with Dr Isma Whitfield, [facility]

## 2019-10-14 ENCOUNTER — OFFICE VISIT (OUTPATIENT)
Dept: NEUROSURGERY | Facility: CLINIC | Age: 54
End: 2019-10-14

## 2019-10-14 VITALS
HEIGHT: 63 IN | TEMPERATURE: 98 F | WEIGHT: 192 LBS | HEART RATE: 102 BPM | RESPIRATION RATE: 18 BRPM | BODY MASS INDEX: 34.02 KG/M2 | SYSTOLIC BLOOD PRESSURE: 147 MMHG | DIASTOLIC BLOOD PRESSURE: 100 MMHG

## 2019-10-14 DIAGNOSIS — H47.49 COMPRESSION OF OPTIC CHIASM: ICD-10-CM

## 2019-10-14 DIAGNOSIS — D35.2 PITUITARY MACROADENOMA (HCC): Primary | ICD-10-CM

## 2019-10-14 PROCEDURE — 99215 OFFICE O/P EST HI 40 MIN: CPT | Performed by: NEUROLOGICAL SURGERY

## 2019-10-14 NOTE — H&P (VIEW-ONLY)
Neurosurgery Office Note  Rafael Pulliam 49 y o  female MRN: [id number]      Assessment/Plan      Diagnoses and all orders for this visit:    Pituitary macroadenoma [facility])  -     Case request operating room: IMAGE GUIDED endoscopic transnasal transphenoidal resection of pituitary mass, possible abdominal fat graft, possible lumbar drain , EXPOSURE TRANSNASAL ENDOSCOPIC FOR TRANSSPHENOIDAL SURGERY; Standing  -     Case request operating room: IMAGE GUIDED endoscopic transnasal transphenoidal resection of pituitary mass, possible abdominal fat graft, possible lumbar drain , EXPOSURE TRANSNASAL ENDOSCOPIC FOR TRANSSPHENOIDAL SURGERY    Compression of optic chiasm  -     Case request operating room: IMAGE GUIDED endoscopic transnasal transphenoidal resection of pituitary mass, possible abdominal fat graft, possible lumbar drain , EXPOSURE TRANSNASAL ENDOSCOPIC FOR TRANSSPHENOIDAL SURGERY; Standing  -     Case request operating room: IMAGE GUIDED endoscopic transnasal transphenoidal resection of pituitary mass, possible abdominal fat graft, possible lumbar drain , EXPOSURE TRANSNASAL ENDOSCOPIC FOR TRANSSPHENOIDAL SURGERY          Discussion:    52-year-old woman who referred by her PCP,  Dr Christie Zuniga, for recurrent pituitary adenoma  She was seen today in the  Office after seeing Ophthalmology, Dr Virginia Matos, and undergoing CTA head  patient relates she underwent transsphenoid, transnasal surgery with Dr Kg Bennett  In 2004 at [facility]  Notes from [facility] and/or [employer] this was a growth hormone secreting or trophic tumor, but there is no evidence of this clinically or in her lab values  She since that time had been following at [facility], and there was mention in 2017 of recurrence, but the patient deferred on re-resection  She has changed insurance recently, was seen by her PCP    The patient was sent for  Blood testing, pituitary panel, and this all appears to be normal   There is no evidence of elevated IGF 1  She is not acromegalic on exam       MRI scan pituitary done 5/1/19 shows considerable mass within the sella, 2 6 cm x 1 9 cm x 1 5 cm abutting and displacing the optic chiasm  On my exam, her visual fields are intact to confrontation   Ophthalmology results from [address] [place] [address]  From 9/26/19 show no bitemporal field loss, although some superior nasal field loss in the left eye more so than inferior, and some circumferential peripheral loss in the right eye more so than the left         I reviewed the patient   Again options for management  I did not recommend expectant/ watch and wait management, as this will likely progress and cause visual decline in this young woman  As this is not a prolactin secreting tumor, medical management is not an effective option  Radiation therapy would need to be in IMRT format,  And is not ideal with optic chiasm compression  Resection I recommended is her best option  I did extensively discuss expected perioperative experience, attendant risks etc    Specifically, expected benefits as decompression of her optic chiasm, although perhaps not a full gross total resection, per in particular with the size of this mass  Attendant risks include but not limited to infection, bleeding, VTE, spinal fluid leak, pituitary dysfunction such as diabetes insipidus, transient or permanent neurologic dysfunction, etc   She would like to proceed, and written consent obtained  Patient does take 81 mg of aspirin, and is unclear on her metoprolol dosing, both these would need to be clarified, and the aspirin held prior to surgery  Metrics:  EQ5D5L 1 000, VA S 80, , ECOG 0, [place] on 8/26/19, 26/30      CHIEF COMPLAINT    Chief Complaint   Patient presents with    Follow-up     follow up after CTA / ENT / Ophtho       HISTORY    History of Present Illness     49y o  year old female     HPI    See Discussion    REVIEW OF SYSTEMS    Review of Systems Constitutional: Negative  HENT: Positive for rhinorrhea  C/o stuffiness with cold since this weekend   Eyes: Negative  Respiratory: Negative  Cardiovascular: Negative  Gastrointestinal: Negative  Endocrine: Positive for cold intolerance  Genitourinary: Negative  Musculoskeletal: Negative  Skin: Negative  Allergic/Immunologic: Negative  Neurological: Negative  Hematological: Negative  Psychiatric/Behavioral: Negative  Meds/Allergies     Current Outpatient Medications   Medication Sig Dispense Refill    aspirin 81 mg chewable tablet Chew 81 mg daily      atorvastatin (LIPITOR) 20 mg tablet Take 20 mg by mouth daily      hydrochlorothiazide (HYDRODIURIL) 12 5 mg tablet Take 12 5 mg by mouth daily      meclizine (ANTIVERT) 12 5 MG tablet Take 2 tablets (25 mg total) by mouth 3 (three) times a day as needed for dizziness 30 tablet 0    metoprolol succinate (TOPROL-XL) 100 mg 24 hr tablet 100 mg daily       metoprolol-hydrochlorothiazide (LOPRESSOR HCT) 100-25 MG per tablet Take 1 tablet by mouth daily      omeprazole (PriLOSEC) 40 MG capsule Take 1 capsule (40 mg total) by mouth daily (Patient not taking: Reported on 2019) 30 capsule 2     No current facility-administered medications for this visit          No Known Allergies    PAST HISTORY    Past Medical History:   Diagnosis Date    GERD (gastroesophageal reflux disease)     Hypercholesteremia     Hypertension     Pituitary tumor        Past Surgical History:   Procedure Laterality Date     SECTION      TRANSPHENOIDAL / TRANSNASAL HYPOPHYSECTOMY / RESECTION PITUITARY TUMOR         Social History     Tobacco Use    Smoking status: Never Smoker    Smokeless tobacco: Never Used   Substance Use Topics    Alcohol use: Not Currently     Frequency: Monthly or less    Drug use: Not Currently       Family History   Problem Relation Age of Onset    Cancer Mother     Heart disease Father    Marilyn Mckinney Hypertension Father     No Known Problems Daughter     No Known Problems Maternal Aunt          Above history personally reviewed  EXAM    Vitals:Blood pressure 147/100, pulse 102, temperature 98 °F (36 7 °C), temperature source Tympanic, resp  rate 18, height 5' 3" (1 6 m), weight 87 1 kg (192 lb)  ,Body mass index is 34 01 kg/m²  Physical Exam   Constitutional: She is oriented to person, place, and time  She appears well-developed and well-nourished  HENT:   Head: Normocephalic  Eyes: No scleral icterus  Neck: Neck supple  Cardiovascular: Normal rate  Pulmonary/Chest: Effort normal    Abdominal: Soft  Neurological: She is alert and oriented to person, place, and time  GCS eye subscore is 4  GCS verbal subscore is 5  GCS motor subscore is 6  Skin: Skin is warm and dry  Psychiatric: She has a normal mood and affect  Her speech is normal and behavior is normal    Vitals reviewed  Neurologic Exam     Mental Status   Oriented to person, place, and time  Attention: normal    Speech: speech is normal   Level of consciousness: alert    Cranial Nerves     CN VII   Facial expression full, symmetric  Motor Exam   Muscle bulk: normal  Overall muscle tone: normal  Moves all extremities, grossly normal     Gait, Coordination, and Reflexes     Tremor   Resting tremor: absent  Intention tremor: absent  Action tremor: absent  No aids  MEDICAL DECISION MAKING    Imaging Studies:     MRI brain from 5/1/19 does contain a thin slice T1 study with contrast      CTA 8/31/19: IMPRESSION:     There is a large sellar and suprasellar mass as seen on prior MRI of the brain consistent with pituitary macroadenoma  This results in slight right lateral displacement of the cavernous segment without stenosis      No aneurysm or vascular malformation  I have personally reviewed pertinent reports     and I have personally reviewed pertinent films in PACS         Lab Results   Component Value Date PROLACTIN 6 1 05/03/2019    CALCITONIN 165 05/03/2019    [id number] 0 771 08/06/2019    FREET4 0 95 05/03/2019    FSH 52 9 05/03/2019    LH 20 2 05/03/2019

## 2019-10-14 NOTE — PROGRESS NOTES
Neurosurgery Office Note  Verona Raymundo 49 y o  female MRN: [id number]      Assessment/Plan      Diagnoses and all orders for this visit:    Pituitary macroadenoma [facility])  -     Case request operating room: IMAGE GUIDED endoscopic transnasal transphenoidal resection of pituitary mass, possible abdominal fat graft, possible lumbar drain , EXPOSURE TRANSNASAL ENDOSCOPIC FOR TRANSSPHENOIDAL SURGERY; Standing  -     Case request operating room: IMAGE GUIDED endoscopic transnasal transphenoidal resection of pituitary mass, possible abdominal fat graft, possible lumbar drain , EXPOSURE TRANSNASAL ENDOSCOPIC FOR TRANSSPHENOIDAL SURGERY    Compression of optic chiasm  -     Case request operating room: IMAGE GUIDED endoscopic transnasal transphenoidal resection of pituitary mass, possible abdominal fat graft, possible lumbar drain , EXPOSURE TRANSNASAL ENDOSCOPIC FOR TRANSSPHENOIDAL SURGERY; Standing  -     Case request operating room: IMAGE GUIDED endoscopic transnasal transphenoidal resection of pituitary mass, possible abdominal fat graft, possible lumbar drain , EXPOSURE TRANSNASAL ENDOSCOPIC FOR TRANSSPHENOIDAL SURGERY          Discussion:    67-year-old woman who referred by her PCP,  Dr Ramila Triplett, for recurrent pituitary adenoma  She was seen today in the  Office after seeing Ophthalmology, Dr Hakan Baird, and undergoing CTA head  patient relates she underwent transsphenoid, transnasal surgery with Dr Yanelis Elizondo  In 2004 at [facility]  Notes from [facility] and/or [facility] this was a growth hormone secreting or trophic tumor, but there is no evidence of this clinically or in her lab values  She since that time had been following at [facility], and there was mention in 2017 of recurrence, but the patient deferred on re-resection  She has changed insurance recently, was seen by her PCP    The patient was sent for  Blood testing, pituitary panel, and this all appears to be normal   There is no evidence of elevated IGF 1  She is not acromegalic on exam       MRI scan pituitary done 5/1/19 shows considerable mass within the sella, 2 6 cm x 1 9 cm x 1 5 cm abutting and displacing the optic chiasm  On my exam, her visual fields are intact to confrontation   Ophthalmology results from [address] [place] [address]  From 9/26/19 show no bitemporal field loss, although some superior nasal field loss in the left eye more so than inferior, and some circumferential peripheral loss in the right eye more so than the left         I reviewed the patient   Again options for management  I did not recommend expectant/ watch and wait management, as this will likely progress and cause visual decline in this young woman  As this is not a prolactin secreting tumor, medical management is not an effective option  Radiation therapy would need to be in IMRT format,  And is not ideal with optic chiasm compression  Resection I recommended is her best option  I did extensively discuss expected perioperative experience, attendant risks etc    Specifically, expected benefits as decompression of her optic chiasm, although perhaps not a full gross total resection, per in particular with the size of this mass  Attendant risks include but not limited to infection, bleeding, VTE, spinal fluid leak, pituitary dysfunction such as diabetes insipidus, transient or permanent neurologic dysfunction, etc   She would like to proceed, and written consent obtained  Patient does take 81 mg of aspirin, and is unclear on her metoprolol dosing, both these would need to be clarified, and the aspirin held prior to surgery  Metrics:  EQ5D5L 1 000, VA S 80, , ECOG 0, [place] on 8/26/19, 26/30      CHIEF COMPLAINT    Chief Complaint   Patient presents with    Follow-up     follow up after CTA / ENT / Ophtho       HISTORY    History of Present Illness     49y o  year old female     HPI    See Discussion    REVIEW OF SYSTEMS    Review of Systems Constitutional: Negative  HENT: Positive for rhinorrhea  C/o stuffiness with cold since this weekend   Eyes: Negative  Respiratory: Negative  Cardiovascular: Negative  Gastrointestinal: Negative  Endocrine: Positive for cold intolerance  Genitourinary: Negative  Musculoskeletal: Negative  Skin: Negative  Allergic/Immunologic: Negative  Neurological: Negative  Hematological: Negative  Psychiatric/Behavioral: Negative  Meds/Allergies     Current Outpatient Medications   Medication Sig Dispense Refill    aspirin 81 mg chewable tablet Chew 81 mg daily      atorvastatin (LIPITOR) 20 mg tablet Take 20 mg by mouth daily      hydrochlorothiazide (HYDRODIURIL) 12 5 mg tablet Take 12 5 mg by mouth daily      meclizine (ANTIVERT) 12 5 MG tablet Take 2 tablets (25 mg total) by mouth 3 (three) times a day as needed for dizziness 30 tablet 0    metoprolol succinate (TOPROL-XL) 100 mg 24 hr tablet 100 mg daily       metoprolol-hydrochlorothiazide (LOPRESSOR HCT) 100-25 MG per tablet Take 1 tablet by mouth daily      omeprazole (PriLOSEC) 40 MG capsule Take 1 capsule (40 mg total) by mouth daily (Patient not taking: Reported on 2019) 30 capsule 2     No current facility-administered medications for this visit          No Known Allergies    PAST HISTORY    Past Medical History:   Diagnosis Date    GERD (gastroesophageal reflux disease)     Hypercholesteremia     Hypertension     Pituitary tumor        Past Surgical History:   Procedure Laterality Date     SECTION      TRANSPHENOIDAL / TRANSNASAL HYPOPHYSECTOMY / RESECTION PITUITARY TUMOR         Social History     Tobacco Use    Smoking status: Never Smoker    Smokeless tobacco: Never Used   Substance Use Topics    Alcohol use: Not Currently     Frequency: Monthly or less    Drug use: Not Currently       Family History   Problem Relation Age of Onset    Cancer Mother     Heart disease Father    Rosana Reese Hypertension Father     No Known Problems Daughter     No Known Problems Maternal Aunt          Above history personally reviewed  EXAM    Vitals:Blood pressure 147/100, pulse 102, temperature 98 °F (36 7 °C), temperature source Tympanic, resp  rate 18, height 5' 3" (1 6 m), weight 87 1 kg (192 lb)  ,Body mass index is 34 01 kg/m²  Physical Exam   Constitutional: She is oriented to person, place, and time  She appears well-developed and well-nourished  HENT:   Head: Normocephalic  Eyes: No scleral icterus  Neck: Neck supple  Cardiovascular: Normal rate  Pulmonary/Chest: Effort normal    Abdominal: Soft  Neurological: She is alert and oriented to person, place, and time  GCS eye subscore is 4  GCS verbal subscore is 5  GCS motor subscore is 6  Skin: Skin is warm and dry  Psychiatric: She has a normal mood and affect  Her speech is normal and behavior is normal    Vitals reviewed  Neurologic Exam     Mental Status   Oriented to person, place, and time  Attention: normal    Speech: speech is normal   Level of consciousness: alert    Cranial Nerves     CN VII   Facial expression full, symmetric  Motor Exam   Muscle bulk: normal  Overall muscle tone: normal  Moves all extremities, grossly normal     Gait, Coordination, and Reflexes     Tremor   Resting tremor: absent  Intention tremor: absent  Action tremor: absent  No aids  MEDICAL DECISION MAKING    Imaging Studies:     MRI brain from 5/1/19 does contain a thin slice T1 study with contrast      CTA 8/31/19: IMPRESSION:     There is a large sellar and suprasellar mass as seen on prior MRI of the brain consistent with pituitary macroadenoma  This results in slight right lateral displacement of the cavernous segment without stenosis      No aneurysm or vascular malformation  I have personally reviewed pertinent reports     and I have personally reviewed pertinent films in PACS         Lab Results   Component Value Date PROLACTIN 6 1 05/03/2019    CALCITONIN 165 05/03/2019    UXM6LVGRTVMJ 0 771 08/06/2019    FREET4 0 95 05/03/2019    FSH 52 9 05/03/2019    LH 20 2 05/03/2019

## 2019-10-15 PROBLEM — D35.2 PITUITARY MACROADENOMA (HCC): Status: ACTIVE | Noted: 2019-10-15

## 2019-10-23 ENCOUNTER — TELEPHONE (OUTPATIENT)
Dept: PREADMISSION TESTING | Facility: HOSPITAL | Age: 54
End: 2019-10-23

## 2019-10-25 ENCOUNTER — TELEPHONE (OUTPATIENT)
Dept: PREADMISSION TESTING | Facility: HOSPITAL | Age: 54
End: 2019-10-25

## 2019-10-30 ENCOUNTER — TRANSCRIBE ORDERS (OUTPATIENT)
Dept: LAB | Facility: CLINIC | Age: 54
End: 2019-10-30

## 2019-10-30 ENCOUNTER — APPOINTMENT (OUTPATIENT)
Dept: LAB | Facility: CLINIC | Age: 54
End: 2019-10-30
Payer: COMMERCIAL

## 2019-10-30 DIAGNOSIS — H47.49 COMPRESSION OF OPTIC CHIASM: ICD-10-CM

## 2019-10-30 DIAGNOSIS — D35.2 PITUITARY MACROADENOMA (HCC): ICD-10-CM

## 2019-10-30 LAB
ABO GROUP BLD: NORMAL
ALBUMIN SERPL BCP-MCNC: 3.9 G/DL (ref 3.5–5)
ALP SERPL-CCNC: 90 U/L (ref 46–116)
ALT SERPL W P-5'-P-CCNC: 49 U/L (ref 12–78)
ANION GAP SERPL CALCULATED.3IONS-SCNC: 7 MMOL/L (ref 4–13)
APTT PPP: 27 SECONDS (ref 23–37)
AST SERPL W P-5'-P-CCNC: 27 U/L (ref 5–45)
B-HCG SERPL-ACNC: <2 MIU/ML
BACTERIA UR QL AUTO: NORMAL /HPF
BASOPHILS # BLD AUTO: 0.06 THOUSANDS/ΜL (ref 0–0.1)
BASOPHILS NFR BLD AUTO: 1 % (ref 0–1)
BILIRUB SERPL-MCNC: 0.27 MG/DL (ref 0.2–1)
BILIRUB UR QL STRIP: NEGATIVE
BLD GP AB SCN SERPL QL: NEGATIVE
BUN SERPL-MCNC: 14 MG/DL (ref 5–25)
CALCIUM SERPL-MCNC: 9.6 MG/DL (ref 8.3–10.1)
CHLORIDE SERPL-SCNC: 111 MMOL/L (ref 100–108)
CLARITY UR: CLEAR
CO2 SERPL-SCNC: 27 MMOL/L (ref 21–32)
COLOR UR: YELLOW
CREAT SERPL-MCNC: 1.07 MG/DL (ref 0.6–1.3)
EOSINOPHIL # BLD AUTO: 0.22 THOUSAND/ΜL (ref 0–0.61)
EOSINOPHIL NFR BLD AUTO: 4 % (ref 0–6)
ERYTHROCYTE [DISTWIDTH] IN BLOOD BY AUTOMATED COUNT: 14.5 % (ref 11.6–15.1)
EST. AVERAGE GLUCOSE BLD GHB EST-MCNC: 131 MG/DL
GFR SERPL CREATININE-BSD FRML MDRD: 68 ML/MIN/1.73SQ M
GLUCOSE P FAST SERPL-MCNC: 84 MG/DL (ref 65–99)
GLUCOSE UR STRIP-MCNC: NEGATIVE MG/DL
HBA1C MFR BLD: 6.2 % (ref 4.2–6.3)
HCT VFR BLD AUTO: 41.2 % (ref 34.8–46.1)
HGB BLD-MCNC: 12.5 G/DL (ref 11.5–15.4)
HGB UR QL STRIP.AUTO: ABNORMAL
HYALINE CASTS #/AREA URNS LPF: NORMAL /LPF
IMM GRANULOCYTES # BLD AUTO: 0.01 THOUSAND/UL (ref 0–0.2)
IMM GRANULOCYTES NFR BLD AUTO: 0 % (ref 0–2)
INR PPP: 0.97 (ref 0.84–1.19)
KETONES UR STRIP-MCNC: NEGATIVE MG/DL
LEUKOCYTE ESTERASE UR QL STRIP: NEGATIVE
LYMPHOCYTES # BLD AUTO: 3.56 THOUSANDS/ΜL (ref 0.6–4.47)
LYMPHOCYTES NFR BLD AUTO: 59 % (ref 14–44)
MCH RBC QN AUTO: 27.3 PG (ref 26.8–34.3)
MCHC RBC AUTO-ENTMCNC: 30.3 G/DL (ref 31.4–37.4)
MCV RBC AUTO: 90 FL (ref 82–98)
MONOCYTES # BLD AUTO: 0.38 THOUSAND/ΜL (ref 0.17–1.22)
MONOCYTES NFR BLD AUTO: 6 % (ref 4–12)
NEUTROPHILS # BLD AUTO: 1.81 THOUSANDS/ΜL (ref 1.85–7.62)
NEUTS SEG NFR BLD AUTO: 30 % (ref 43–75)
NITRITE UR QL STRIP: NEGATIVE
NON-SQ EPI CELLS URNS QL MICRO: NORMAL /HPF
NRBC BLD AUTO-RTO: 0 /100 WBCS
PH UR STRIP.AUTO: 6 [PH]
PLATELET # BLD AUTO: 232 THOUSANDS/UL (ref 149–390)
PMV BLD AUTO: 13.1 FL (ref 8.9–12.7)
POTASSIUM SERPL-SCNC: 3.8 MMOL/L (ref 3.5–5.3)
PROT SERPL-MCNC: 7.9 G/DL (ref 6.4–8.2)
PROT UR STRIP-MCNC: NEGATIVE MG/DL
PROTHROMBIN TIME: 12.5 SECONDS (ref 11.6–14.5)
RBC # BLD AUTO: 4.58 MILLION/UL (ref 3.81–5.12)
RBC #/AREA URNS AUTO: NORMAL /HPF
RH BLD: POSITIVE
SODIUM SERPL-SCNC: 145 MMOL/L (ref 136–145)
SP GR UR STRIP.AUTO: 1.02 (ref 1–1.03)
SPECIMEN EXPIRATION DATE: NORMAL
UROBILINOGEN UR QL STRIP.AUTO: 0.2 E.U./DL
WBC # BLD AUTO: 6.04 THOUSAND/UL (ref 4.31–10.16)
WBC #/AREA URNS AUTO: NORMAL /HPF

## 2019-10-30 PROCEDURE — 36415 COLL VENOUS BLD VENIPUNCTURE: CPT

## 2019-10-30 PROCEDURE — 85610 PROTHROMBIN TIME: CPT

## 2019-10-30 PROCEDURE — 84702 CHORIONIC GONADOTROPIN TEST: CPT

## 2019-10-30 PROCEDURE — 85025 COMPLETE CBC W/AUTO DIFF WBC: CPT

## 2019-10-30 PROCEDURE — 83036 HEMOGLOBIN GLYCOSYLATED A1C: CPT

## 2019-10-30 PROCEDURE — 86900 BLOOD TYPING SEROLOGIC ABO: CPT

## 2019-10-30 PROCEDURE — 86901 BLOOD TYPING SEROLOGIC RH(D): CPT

## 2019-10-30 PROCEDURE — 80053 COMPREHEN METABOLIC PANEL: CPT

## 2019-10-30 PROCEDURE — 81001 URINALYSIS AUTO W/SCOPE: CPT | Performed by: NEUROLOGICAL SURGERY

## 2019-10-30 PROCEDURE — 85730 THROMBOPLASTIN TIME PARTIAL: CPT

## 2019-10-30 PROCEDURE — 86850 RBC ANTIBODY SCREEN: CPT

## 2019-10-31 ENCOUNTER — ANESTHESIA EVENT (OUTPATIENT)
Dept: PERIOP | Facility: HOSPITAL | Age: 54
DRG: 614 | End: 2019-10-31

## 2019-10-31 NOTE — PRE-PROCEDURE INSTRUCTIONS
Pre-Surgery Instructions:   Medication Instructions    aspirin 81 mg chewable tablet Instructed patient per Anesthesia Guidelines   atorvastatin (LIPITOR) 20 mg tablet Instructed patient per Anesthesia Guidelines   hydrochlorothiazide (HYDRODIURIL) 12 5 mg tablet Instructed patient per Anesthesia Guidelines   meclizine (ANTIVERT) 12 5 MG tablet Instructed patient per Anesthesia Guidelines   metoprolol succinate (TOPROL-XL) 100 mg 24 hr tablet Instructed patient per Anesthesia Guidelines  Pt instructed to stop aspirin one week prior to surgery    Pt instructed to NOT take hctz on day of surgery

## 2019-10-31 NOTE — PRE-PROCEDURE INSTRUCTIONS
Pre-Surgery Instructions:   Medication Instructions    aspirin 81 mg chewable tablet Instructed patient per Anesthesia Guidelines   atorvastatin (LIPITOR) 20 mg tablet Instructed patient per Anesthesia Guidelines   hydrochlorothiazide (HYDRODIURIL) 12 5 mg tablet Instructed patient per Anesthesia Guidelines   meclizine (ANTIVERT) 12 5 MG tablet Instructed patient per Anesthesia Guidelines   metoprolol succinate (TOPROL-XL) 100 mg 24 hr tablet Instructed patient per Anesthesia Guidelines

## 2019-11-01 ENCOUNTER — TELEPHONE (OUTPATIENT)
Dept: FAMILY MEDICINE CLINIC | Facility: CLINIC | Age: 54
End: 2019-11-01

## 2019-11-01 NOTE — TELEPHONE ENCOUNTER
Pt sees Dr Christian Hilliard  She is having Brain sx on 11/7/19  Needs pre-op done on Tuesday 11/5   Where could I put her in?

## 2019-11-05 ENCOUNTER — CONSULT (OUTPATIENT)
Dept: FAMILY MEDICINE CLINIC | Facility: CLINIC | Age: 54
End: 2019-11-05

## 2019-11-05 ENCOUNTER — CLINICAL SUPPORT (OUTPATIENT)
Dept: FAMILY MEDICINE CLINIC | Facility: CLINIC | Age: 54
End: 2019-11-05

## 2019-11-05 VITALS
WEIGHT: 195 LBS | HEART RATE: 63 BPM | OXYGEN SATURATION: 98 % | HEIGHT: 63 IN | SYSTOLIC BLOOD PRESSURE: 138 MMHG | TEMPERATURE: 98.5 F | BODY MASS INDEX: 34.55 KG/M2 | RESPIRATION RATE: 18 BRPM | DIASTOLIC BLOOD PRESSURE: 86 MMHG

## 2019-11-05 DIAGNOSIS — D35.2 PITUITARY MACROADENOMA (HCC): ICD-10-CM

## 2019-11-05 DIAGNOSIS — Z01.818 PRE-OP TESTING: ICD-10-CM

## 2019-11-05 DIAGNOSIS — Z01.818 ENCOUNTER FOR PREOPERATIVE EXAMINATION FOR GENERAL SURGICAL PROCEDURE: Primary | ICD-10-CM

## 2019-11-05 PROCEDURE — 93000 ELECTROCARDIOGRAM COMPLETE: CPT

## 2019-11-05 PROCEDURE — 99243 OFF/OP CNSLTJ NEW/EST LOW 30: CPT | Performed by: FAMILY MEDICINE

## 2019-11-05 NOTE — PROGRESS NOTES
Assessment/Plan:    No problem-specific Assessment & Plan notes found for this encounter  Diagnoses and all orders for this visit:    Encounter for preoperative examination for general surgical procedure  Comments:  pt is a low cardiovascular risk for proposed procedure by ACC/AHA guidelines  Orders:  -     ECG 12 lead; Future    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HCC)    Pre-op testing  -     ECG 12 lead; Future          PHQ-9 Depression Screening    PHQ-9:    Frequency of the following problems over the past two weeks:       Little interest or pleasure in doing things:  0 - not at all  Feeling down, depressed, or hopeless:  0 - not at all  PHQ-2 Score:  0            Subjective:      Patient ID: Tristen Robbins is a 49 y o  female  Follow up for pituitary tumor pt is set up for surgical resection      The following portions of the patient's history were reviewed and updated as appropriate: allergies, current medications, past family history, past medical history, past social history, past surgical history and problem list     Review of Systems   Constitutional: Negative for chills, fatigue and fever  HENT: Negative  Eyes: Negative  Respiratory: Negative for shortness of breath and wheezing  Cardiovascular: Negative for chest pain and palpitations  Gastrointestinal: Negative for abdominal pain, blood in stool, constipation, diarrhea, nausea and vomiting  Endocrine: Negative  Genitourinary: Negative for difficulty urinating and dysuria  Musculoskeletal: Negative for arthralgias and myalgias  Skin: Negative  Allergic/Immunologic: Negative  Neurological: Negative for seizures and syncope  Hematological: Negative for adenopathy  Psychiatric/Behavioral: Negative            Objective:    /86   Pulse 63   Temp 98 5 °F (36 9 °C) (Tympanic)   Resp 18   Ht 5' 3" (1 6 m)   Wt 88 5 kg (195 lb)   SpO2 98%   BMI 34 54 kg/m²      Physical Exam   Constitutional: She is oriented to person, place, and time  She appears well-developed and well-nourished  HENT:   Head: Normocephalic and atraumatic  Right Ear: External ear normal    Left Ear: External ear normal    Nose: Nose normal    Mouth/Throat: Oropharynx is clear and moist    Eyes: Pupils are equal, round, and reactive to light  Conjunctivae and EOM are normal    Neck: Normal range of motion  Neck supple  Cardiovascular: Normal rate, regular rhythm and normal heart sounds  No murmur heard  Pulmonary/Chest: Effort normal and breath sounds normal  No respiratory distress  She has no wheezes  She has no rales  Abdominal: Soft  Bowel sounds are normal  She exhibits no distension and no mass  There is no tenderness  There is no rebound and no guarding  Musculoskeletal: Normal range of motion  She exhibits no edema  Lymphadenopathy:     She has no cervical adenopathy  Neurological: She is alert and oriented to person, place, and time  She has normal reflexes  She exhibits normal muscle tone  Skin: Skin is warm and dry  Psychiatric: She has a normal mood and affect  Her behavior is normal  Judgment and thought content normal    Nursing note and vitals reviewed

## 2019-11-06 ENCOUNTER — DOCUMENTATION (OUTPATIENT)
Dept: NEUROSURGERY | Facility: CLINIC | Age: 54
End: 2019-11-06

## 2019-11-06 NOTE — PROGRESS NOTES
Patient is scheduled for surgery tomorrow on 11/7/19 with Dr Roselyn Aguilar  No record exists on [place] PDMP or [place]  sites

## 2019-11-07 ENCOUNTER — HOSPITAL ENCOUNTER (INPATIENT)
Facility: HOSPITAL | Age: 54
LOS: 4 days | Discharge: HOME | DRG: 614 | End: 2019-11-11
Attending: NEUROLOGICAL SURGERY | Admitting: NEUROLOGICAL SURGERY

## 2019-11-07 ENCOUNTER — ANESTHESIA (OUTPATIENT)
Dept: PERIOP | Facility: HOSPITAL | Age: 54
DRG: 614 | End: 2019-11-07

## 2019-11-07 DIAGNOSIS — E89.3 STATUS POST TRANSSPHENOIDAL PITUITARY RESECTION (HCC): ICD-10-CM

## 2019-11-07 DIAGNOSIS — G89.18 POST-OP PAIN: ICD-10-CM

## 2019-11-07 DIAGNOSIS — K59.03 CONSTIPATION DUE TO PAIN MEDICATION: Primary | ICD-10-CM

## 2019-11-07 DIAGNOSIS — D35.2 PITUITARY MACROADENOMA (HCC): ICD-10-CM

## 2019-11-07 DIAGNOSIS — H47.49 COMPRESSION OF OPTIC CHIASM: ICD-10-CM

## 2019-11-07 PROBLEM — E66.9 CLASS 1 OBESITY IN ADULT: Chronic | Status: ACTIVE | Noted: 2019-11-07

## 2019-11-07 LAB — GLUCOSE SERPL-MCNC: 129 MG/DL (ref 65–140)

## 2019-11-07 PROCEDURE — 61781 SCAN PROC CRANIAL INTRA: CPT | Performed by: NEUROLOGICAL SURGERY

## 2019-11-07 PROCEDURE — 09UX07Z SUPPLEMENT LEFT SPHENOID SINUS WITH AUTOLOGOUS TISSUE SUBSTITUTE, OPEN APPROACH: ICD-10-PCS | Performed by: NEUROLOGICAL SURGERY

## 2019-11-07 PROCEDURE — 09UM87Z SUPPLEMENT NASAL SEPTUM WITH AUTOLOGOUS TISSUE SUBSTITUTE, VIA NATURAL OR ARTIFICIAL OPENING ENDOSCOPIC: ICD-10-PCS | Performed by: OTOLARYNGOLOGY

## 2019-11-07 PROCEDURE — C1781 MESH (IMPLANTABLE): HCPCS | Performed by: NEUROLOGICAL SURGERY

## 2019-11-07 PROCEDURE — 88331 PATH CONSLTJ SURG 1 BLK 1SPC: CPT | Performed by: NEUROLOGICAL SURGERY

## 2019-11-07 PROCEDURE — 88333 PATH CONSLTJ SURG CYTO XM 1: CPT | Performed by: PATHOLOGY

## 2019-11-07 PROCEDURE — 88331 PATH CONSLTJ SURG 1 BLK 1SPC: CPT | Performed by: PATHOLOGY

## 2019-11-07 PROCEDURE — 88307 TISSUE EXAM BY PATHOLOGIST: CPT | Performed by: PATHOLOGY

## 2019-11-07 PROCEDURE — 20926 PR REMV TISSUE FOR GRAFT OTHR: CPT | Performed by: NEUROLOGICAL SURGERY

## 2019-11-07 PROCEDURE — 09UW07Z SUPPLEMENT RIGHT SPHENOID SINUS WITH AUTOLOGOUS TISSUE SUBSTITUTE, OPEN APPROACH: ICD-10-PCS | Performed by: NEUROLOGICAL SURGERY

## 2019-11-07 PROCEDURE — 62165 REMOVE PITUIT TUMOR W/SCOPE: CPT | Performed by: OTOLARYNGOLOGY

## 2019-11-07 PROCEDURE — 86920 COMPATIBILITY TEST SPIN: CPT

## 2019-11-07 PROCEDURE — 82948 REAGENT STRIP/BLOOD GLUCOSE: CPT

## 2019-11-07 PROCEDURE — 0JB80ZZ EXCISION OF ABDOMEN SUBCUTANEOUS TISSUE AND FASCIA, OPEN APPROACH: ICD-10-PCS | Performed by: NEUROLOGICAL SURGERY

## 2019-11-07 PROCEDURE — 62165 REMOVE PITUIT TUMOR W/SCOPE: CPT | Performed by: NEUROLOGICAL SURGERY

## 2019-11-07 PROCEDURE — 99232 SBSQ HOSP IP/OBS MODERATE 35: CPT | Performed by: EMERGENCY MEDICINE

## 2019-11-07 PROCEDURE — 09BK8ZZ EXCISION OF NASAL MUCOSA AND SOFT TISSUE, VIA NATURAL OR ARTIFICIAL OPENING ENDOSCOPIC: ICD-10-PCS | Performed by: OTOLARYNGOLOGY

## 2019-11-07 PROCEDURE — 8E09XBZ COMPUTER ASSISTED PROCEDURE OF HEAD AND NECK REGION: ICD-10-PCS | Performed by: OTOLARYNGOLOGY

## 2019-11-07 PROCEDURE — 0GT00ZZ RESECTION OF PITUITARY GLAND, OPEN APPROACH: ICD-10-PCS | Performed by: NEUROLOGICAL SURGERY

## 2019-11-07 PROCEDURE — 88342 IMHCHEM/IMCYTCHM 1ST ANTB: CPT

## 2019-11-07 DEVICE — DURA 62106 SUBSTITUTE DUREPAIR 1X3IN NCE
Type: IMPLANTABLE DEVICE | Site: NOSE | Status: FUNCTIONAL
Brand: DUREPAIR®

## 2019-11-07 RX ORDER — LIDOCAINE HYDROCHLORIDE 10 MG/ML
0.5 INJECTION, SOLUTION EPIDURAL; INFILTRATION; INTRACAUDAL; PERINEURAL ONCE AS NEEDED
Status: COMPLETED | OUTPATIENT
Start: 2019-11-07 | End: 2019-11-07

## 2019-11-07 RX ORDER — METOCLOPRAMIDE HYDROCHLORIDE 5 MG/ML
10 INJECTION INTRAMUSCULAR; INTRAVENOUS ONCE AS NEEDED
Status: DISCONTINUED | OUTPATIENT
Start: 2019-11-07 | End: 2019-11-07 | Stop reason: HOSPADM

## 2019-11-07 RX ORDER — DEXAMETHASONE SODIUM PHOSPHATE 10 MG/ML
INJECTION, SOLUTION INTRAMUSCULAR; INTRAVENOUS AS NEEDED
Status: DISCONTINUED | OUTPATIENT
Start: 2019-11-07 | End: 2019-11-07 | Stop reason: SURG

## 2019-11-07 RX ORDER — LABETALOL 20 MG/4 ML (5 MG/ML) INTRAVENOUS SYRINGE
10 EVERY 6 HOURS PRN
Status: DISCONTINUED | OUTPATIENT
Start: 2019-11-07 | End: 2019-11-08

## 2019-11-07 RX ORDER — SODIUM CHLORIDE 9 MG/ML
INJECTION, SOLUTION INTRAVENOUS CONTINUOUS PRN
Status: DISCONTINUED | OUTPATIENT
Start: 2019-11-07 | End: 2019-11-07 | Stop reason: SURG

## 2019-11-07 RX ORDER — CEFAZOLIN SODIUM 2 G/50ML
2000 SOLUTION INTRAVENOUS ONCE
Status: COMPLETED | OUTPATIENT
Start: 2019-11-07 | End: 2019-11-07

## 2019-11-07 RX ORDER — MECLIZINE HYDROCHLORIDE 25 MG/1
25 TABLET ORAL 3 TIMES DAILY PRN
Status: DISCONTINUED | OUTPATIENT
Start: 2019-11-07 | End: 2019-11-11 | Stop reason: HOSPADM

## 2019-11-07 RX ORDER — BISACODYL 10 MG
10 SUPPOSITORY, RECTAL RECTAL DAILY PRN
Status: DISCONTINUED | OUTPATIENT
Start: 2019-11-07 | End: 2019-11-11 | Stop reason: HOSPADM

## 2019-11-07 RX ORDER — METOPROLOL TARTRATE 50 MG/1
100 TABLET, FILM COATED ORAL ONCE
Status: COMPLETED | OUTPATIENT
Start: 2019-11-07 | End: 2019-11-07

## 2019-11-07 RX ORDER — NEOSTIGMINE METHYLSULFATE 1 MG/ML
INJECTION INTRAVENOUS AS NEEDED
Status: DISCONTINUED | OUTPATIENT
Start: 2019-11-07 | End: 2019-11-07 | Stop reason: SURG

## 2019-11-07 RX ORDER — HYDRALAZINE HYDROCHLORIDE 20 MG/ML
10 INJECTION INTRAMUSCULAR; INTRAVENOUS EVERY 6 HOURS PRN
Status: DISCONTINUED | OUTPATIENT
Start: 2019-11-07 | End: 2019-11-08

## 2019-11-07 RX ORDER — LABETALOL 20 MG/4 ML (5 MG/ML) INTRAVENOUS SYRINGE
Status: COMPLETED
Start: 2019-11-07 | End: 2019-11-07

## 2019-11-07 RX ORDER — FENTANYL CITRATE/PF 50 MCG/ML
25 SYRINGE (ML) INJECTION
Status: DISCONTINUED | OUTPATIENT
Start: 2019-11-07 | End: 2019-11-07 | Stop reason: HOSPADM

## 2019-11-07 RX ORDER — SODIUM CHLORIDE, SODIUM LACTATE, POTASSIUM CHLORIDE, CALCIUM CHLORIDE 600; 310; 30; 20 MG/100ML; MG/100ML; MG/100ML; MG/100ML
100 INJECTION, SOLUTION INTRAVENOUS CONTINUOUS
Status: DISCONTINUED | OUTPATIENT
Start: 2019-11-07 | End: 2019-11-07

## 2019-11-07 RX ORDER — DIPHENHYDRAMINE HYDROCHLORIDE 50 MG/ML
12.5 INJECTION INTRAMUSCULAR; INTRAVENOUS ONCE AS NEEDED
Status: DISCONTINUED | OUTPATIENT
Start: 2019-11-07 | End: 2019-11-07 | Stop reason: HOSPADM

## 2019-11-07 RX ORDER — ONDANSETRON 2 MG/ML
4 INJECTION INTRAMUSCULAR; INTRAVENOUS EVERY 6 HOURS PRN
Status: DISCONTINUED | OUTPATIENT
Start: 2019-11-07 | End: 2019-11-11 | Stop reason: HOSPADM

## 2019-11-07 RX ORDER — FENTANYL CITRATE 50 UG/ML
INJECTION, SOLUTION INTRAMUSCULAR; INTRAVENOUS AS NEEDED
Status: DISCONTINUED | OUTPATIENT
Start: 2019-11-07 | End: 2019-11-07 | Stop reason: SURG

## 2019-11-07 RX ORDER — LIDOCAINE HYDROCHLORIDE 10 MG/ML
INJECTION, SOLUTION INFILTRATION; PERINEURAL AS NEEDED
Status: DISCONTINUED | OUTPATIENT
Start: 2019-11-07 | End: 2019-11-07 | Stop reason: SURG

## 2019-11-07 RX ORDER — ONDANSETRON 2 MG/ML
INJECTION INTRAMUSCULAR; INTRAVENOUS AS NEEDED
Status: DISCONTINUED | OUTPATIENT
Start: 2019-11-07 | End: 2019-11-07 | Stop reason: SURG

## 2019-11-07 RX ORDER — OXYCODONE HYDROCHLORIDE 5 MG/1
5 TABLET ORAL EVERY 4 HOURS PRN
Status: DISCONTINUED | OUTPATIENT
Start: 2019-11-07 | End: 2019-11-11 | Stop reason: HOSPADM

## 2019-11-07 RX ORDER — OXYCODONE HYDROCHLORIDE 10 MG/1
10 TABLET ORAL EVERY 4 HOURS PRN
Status: DISCONTINUED | OUTPATIENT
Start: 2019-11-07 | End: 2019-11-11 | Stop reason: HOSPADM

## 2019-11-07 RX ORDER — ATORVASTATIN CALCIUM 20 MG/1
20 TABLET, FILM COATED ORAL DAILY
Status: DISCONTINUED | OUTPATIENT
Start: 2019-11-08 | End: 2019-11-11 | Stop reason: HOSPADM

## 2019-11-07 RX ORDER — ACETAMINOPHEN 325 MG/1
975 TABLET ORAL EVERY 8 HOURS SCHEDULED
Status: DISCONTINUED | OUTPATIENT
Start: 2019-11-07 | End: 2019-11-11 | Stop reason: HOSPADM

## 2019-11-07 RX ORDER — METOPROLOL TARTRATE 5 MG/5ML
5 INJECTION INTRAVENOUS ONCE
Status: COMPLETED | OUTPATIENT
Start: 2019-11-07 | End: 2019-11-07

## 2019-11-07 RX ORDER — EPINEPHRINE 1 MG/ML
INJECTION, SOLUTION, CONCENTRATE INTRAVENOUS AS NEEDED
Status: DISCONTINUED | OUTPATIENT
Start: 2019-11-07 | End: 2019-11-07 | Stop reason: HOSPADM

## 2019-11-07 RX ORDER — PROPOFOL 10 MG/ML
INJECTION, EMULSION INTRAVENOUS AS NEEDED
Status: DISCONTINUED | OUTPATIENT
Start: 2019-11-07 | End: 2019-11-07 | Stop reason: SURG

## 2019-11-07 RX ORDER — SODIUM CHLORIDE 9 MG/ML
100 INJECTION, SOLUTION INTRAVENOUS CONTINUOUS
Status: DISCONTINUED | OUTPATIENT
Start: 2019-11-07 | End: 2019-11-08

## 2019-11-07 RX ORDER — ONDANSETRON 2 MG/ML
4 INJECTION INTRAMUSCULAR; INTRAVENOUS ONCE AS NEEDED
Status: DISCONTINUED | OUTPATIENT
Start: 2019-11-07 | End: 2019-11-07 | Stop reason: HOSPADM

## 2019-11-07 RX ORDER — HYDROCHLOROTHIAZIDE 12.5 MG/1
12.5 TABLET ORAL DAILY
Status: DISCONTINUED | OUTPATIENT
Start: 2019-11-08 | End: 2019-11-11 | Stop reason: HOSPADM

## 2019-11-07 RX ORDER — HYDROMORPHONE HCL/PF 1 MG/ML
0.5 SYRINGE (ML) INJECTION
Status: DISCONTINUED | OUTPATIENT
Start: 2019-11-07 | End: 2019-11-07 | Stop reason: HOSPADM

## 2019-11-07 RX ORDER — SODIUM CHLORIDE, SODIUM LACTATE, POTASSIUM CHLORIDE, CALCIUM CHLORIDE 600; 310; 30; 20 MG/100ML; MG/100ML; MG/100ML; MG/100ML
50 INJECTION, SOLUTION INTRAVENOUS CONTINUOUS
Status: DISCONTINUED | OUTPATIENT
Start: 2019-11-07 | End: 2019-11-07

## 2019-11-07 RX ORDER — METOPROLOL TARTRATE 5 MG/5ML
INJECTION INTRAVENOUS AS NEEDED
Status: DISCONTINUED | OUTPATIENT
Start: 2019-11-07 | End: 2019-11-07 | Stop reason: SURG

## 2019-11-07 RX ORDER — LIDOCAINE HYDROCHLORIDE AND EPINEPHRINE 10; 10 MG/ML; UG/ML
INJECTION, SOLUTION INFILTRATION; PERINEURAL AS NEEDED
Status: DISCONTINUED | OUTPATIENT
Start: 2019-11-07 | End: 2019-11-07 | Stop reason: HOSPADM

## 2019-11-07 RX ORDER — GLYCOPYRROLATE 0.2 MG/ML
INJECTION INTRAMUSCULAR; INTRAVENOUS AS NEEDED
Status: DISCONTINUED | OUTPATIENT
Start: 2019-11-07 | End: 2019-11-07 | Stop reason: SURG

## 2019-11-07 RX ORDER — CEFAZOLIN SODIUM 1 G/50ML
1000 SOLUTION INTRAVENOUS EVERY 8 HOURS
Status: COMPLETED | OUTPATIENT
Start: 2019-11-08 | End: 2019-11-08

## 2019-11-07 RX ORDER — CHLORHEXIDINE GLUCONATE 0.12 MG/ML
15 RINSE ORAL ONCE
Status: DISCONTINUED | OUTPATIENT
Start: 2019-11-07 | End: 2019-11-07 | Stop reason: HOSPADM

## 2019-11-07 RX ORDER — METOPROLOL SUCCINATE 100 MG/1
100 TABLET, EXTENDED RELEASE ORAL DAILY
Status: DISCONTINUED | OUTPATIENT
Start: 2019-11-08 | End: 2019-11-11 | Stop reason: HOSPADM

## 2019-11-07 RX ORDER — ROCURONIUM BROMIDE 10 MG/ML
INJECTION, SOLUTION INTRAVENOUS AS NEEDED
Status: DISCONTINUED | OUTPATIENT
Start: 2019-11-07 | End: 2019-11-07 | Stop reason: SURG

## 2019-11-07 RX ORDER — LABETALOL 20 MG/4 ML (5 MG/ML) INTRAVENOUS SYRINGE
AS NEEDED
Status: DISCONTINUED | OUTPATIENT
Start: 2019-11-07 | End: 2019-11-07 | Stop reason: SURG

## 2019-11-07 RX ORDER — MAGNESIUM HYDROXIDE 1200 MG/15ML
LIQUID ORAL AS NEEDED
Status: DISCONTINUED | OUTPATIENT
Start: 2019-11-07 | End: 2019-11-07 | Stop reason: HOSPADM

## 2019-11-07 RX ORDER — HYDROMORPHONE HCL/PF 1 MG/ML
0.5 SYRINGE (ML) INJECTION
Status: DISCONTINUED | OUTPATIENT
Start: 2019-11-07 | End: 2019-11-11 | Stop reason: HOSPADM

## 2019-11-07 RX ORDER — DOCUSATE SODIUM 100 MG/1
100 CAPSULE, LIQUID FILLED ORAL 2 TIMES DAILY
Status: DISCONTINUED | OUTPATIENT
Start: 2019-11-07 | End: 2019-11-11 | Stop reason: HOSPADM

## 2019-11-07 RX ADMIN — SODIUM CHLORIDE 100 ML/HR: 0.9 INJECTION, SOLUTION INTRAVENOUS at 23:18

## 2019-11-07 RX ADMIN — FENTANYL CITRATE 50 MCG: 50 INJECTION, SOLUTION INTRAMUSCULAR; INTRAVENOUS at 11:36

## 2019-11-07 RX ADMIN — METOPROLOL TARTRATE 100 MG: 50 TABLET, FILM COATED ORAL at 20:05

## 2019-11-07 RX ADMIN — METOPROLOL TARTRATE 1 MG: 5 INJECTION, SOLUTION INTRAVENOUS at 11:44

## 2019-11-07 RX ADMIN — CEFAZOLIN SODIUM 2000 MG: 2 SOLUTION INTRAVENOUS at 15:49

## 2019-11-07 RX ADMIN — FENTANYL CITRATE 50 MCG: 50 INJECTION, SOLUTION INTRAMUSCULAR; INTRAVENOUS at 11:38

## 2019-11-07 RX ADMIN — PHENYLEPHRINE HYDROCHLORIDE 100 MCG: 10 INJECTION INTRAVENOUS at 15:22

## 2019-11-07 RX ADMIN — LABETALOL 20 MG/4 ML (5 MG/ML) INTRAVENOUS SYRINGE 10 MG: at 18:20

## 2019-11-07 RX ADMIN — ROCURONIUM BROMIDE 10 MG: 10 INJECTION, SOLUTION INTRAVENOUS at 13:05

## 2019-11-07 RX ADMIN — ROCURONIUM BROMIDE 50 MG: 10 INJECTION, SOLUTION INTRAVENOUS at 11:36

## 2019-11-07 RX ADMIN — PHENYLEPHRINE HYDROCHLORIDE 100 MCG: 10 INJECTION INTRAVENOUS at 12:06

## 2019-11-07 RX ADMIN — ACETAMINOPHEN 975 MG: 325 TABLET ORAL at 21:41

## 2019-11-07 RX ADMIN — METOPROLOL TARTRATE 5 MG: 5 INJECTION INTRAVENOUS at 19:18

## 2019-11-07 RX ADMIN — METOPROLOL TARTRATE 1 MG: 5 INJECTION, SOLUTION INTRAVENOUS at 15:01

## 2019-11-07 RX ADMIN — LIDOCAINE HYDROCHLORIDE 50 MG: 10 INJECTION, SOLUTION INFILTRATION; PERINEURAL at 15:45

## 2019-11-07 RX ADMIN — FENTANYL CITRATE 50 MCG: 50 INJECTION, SOLUTION INTRAMUSCULAR; INTRAVENOUS at 14:39

## 2019-11-07 RX ADMIN — PHENYLEPHRINE HYDROCHLORIDE 20 MCG/MIN: 10 INJECTION INTRAVENOUS at 12:05

## 2019-11-07 RX ADMIN — SODIUM CHLORIDE, SODIUM LACTATE, POTASSIUM CHLORIDE, AND CALCIUM CHLORIDE 100 ML/HR: .6; .31; .03; .02 INJECTION, SOLUTION INTRAVENOUS at 07:54

## 2019-11-07 RX ADMIN — HYDRALAZINE HYDROCHLORIDE 10 MG: 20 INJECTION INTRAMUSCULAR; INTRAVENOUS at 17:57

## 2019-11-07 RX ADMIN — SODIUM CHLORIDE 100 ML/HR: 0.9 INJECTION, SOLUTION INTRAVENOUS at 17:00

## 2019-11-07 RX ADMIN — DEXAMETHASONE SODIUM PHOSPHATE 5 MG: 10 INJECTION, SOLUTION INTRAMUSCULAR; INTRAVENOUS at 12:10

## 2019-11-07 RX ADMIN — CEFAZOLIN SODIUM 1000 MG: 1 SOLUTION INTRAVENOUS at 23:18

## 2019-11-07 RX ADMIN — ACETAMINOPHEN 975 MG: 325 TABLET ORAL at 17:47

## 2019-11-07 RX ADMIN — ROCURONIUM BROMIDE 10 MG: 10 INJECTION, SOLUTION INTRAVENOUS at 14:43

## 2019-11-07 RX ADMIN — SODIUM CHLORIDE: 0.9 INJECTION, SOLUTION INTRAVENOUS at 13:10

## 2019-11-07 RX ADMIN — SODIUM CHLORIDE: 0.9 INJECTION, SOLUTION INTRAVENOUS at 11:40

## 2019-11-07 RX ADMIN — LABETALOL 20 MG/4 ML (5 MG/ML) INTRAVENOUS SYRINGE 5 MG: at 15:43

## 2019-11-07 RX ADMIN — SODIUM CHLORIDE, SODIUM LACTATE, POTASSIUM CHLORIDE, AND CALCIUM CHLORIDE: .6; .31; .03; .02 INJECTION, SOLUTION INTRAVENOUS at 11:25

## 2019-11-07 RX ADMIN — FENTANYL CITRATE 50 MCG: 50 INJECTION, SOLUTION INTRAMUSCULAR; INTRAVENOUS at 15:08

## 2019-11-07 RX ADMIN — ROCURONIUM BROMIDE 10 MG: 10 INJECTION, SOLUTION INTRAVENOUS at 13:26

## 2019-11-07 RX ADMIN — CEFAZOLIN SODIUM 2000 MG: 2 SOLUTION INTRAVENOUS at 11:56

## 2019-11-07 RX ADMIN — LIDOCAINE HYDROCHLORIDE 0.5 ML: 10 INJECTION, SOLUTION EPIDURAL; INFILTRATION; INTRACAUDAL; PERINEURAL at 07:53

## 2019-11-07 RX ADMIN — ONDANSETRON 4 MG: 2 INJECTION INTRAMUSCULAR; INTRAVENOUS at 12:10

## 2019-11-07 RX ADMIN — PROPOFOL 200 MG: 10 INJECTION, EMULSION INTRAVENOUS at 11:36

## 2019-11-07 RX ADMIN — LIDOCAINE HYDROCHLORIDE 50 MG: 10 INJECTION, SOLUTION INFILTRATION; PERINEURAL at 11:36

## 2019-11-07 RX ADMIN — OXYCODONE HYDROCHLORIDE 10 MG: 10 TABLET ORAL at 17:45

## 2019-11-07 RX ADMIN — FENTANYL CITRATE 25 MCG: 50 INJECTION, SOLUTION INTRAMUSCULAR; INTRAVENOUS at 13:35

## 2019-11-07 RX ADMIN — Medication 1 SPRAY: at 22:06

## 2019-11-07 RX ADMIN — FENTANYL CITRATE 25 MCG: 50 INJECTION, SOLUTION INTRAMUSCULAR; INTRAVENOUS at 13:52

## 2019-11-07 RX ADMIN — NEOSTIGMINE METHYLSULFATE 3 MG: 1 INJECTION, SOLUTION INTRAVENOUS at 15:44

## 2019-11-07 RX ADMIN — GLYCOPYRROLATE 0.4 MG: 0.2 INJECTION, SOLUTION INTRAMUSCULAR; INTRAVENOUS at 15:44

## 2019-11-07 RX ADMIN — PHENYLEPHRINE HYDROCHLORIDE 100 MCG: 10 INJECTION INTRAVENOUS at 15:14

## 2019-11-07 RX ADMIN — METOPROLOL TARTRATE 1 MG: 5 INJECTION, SOLUTION INTRAVENOUS at 11:41

## 2019-11-07 RX ADMIN — HYDROMORPHONE HYDROCHLORIDE 0.5 MG: 1 INJECTION, SOLUTION INTRAMUSCULAR; INTRAVENOUS; SUBCUTANEOUS at 19:37

## 2019-11-07 NOTE — PLAN OF CARE
Problem: Potential for Falls  Goal: Patient will remain free of falls  Description  INTERVENTIONS:  - Assess patient frequently for physical needs  -  Identify cognitive and physical deficits and behaviors that affect risk of falls  -  [place] fall precautions as indicated by assessment   - Educate patient/family on patient safety including physical limitations  - Instruct patient to call for assistance with activity based on assessment  - Modify environment to reduce risk of injury  - Consider OT/PT consult to assist with strengthening/mobility  Outcome: Progressing     Problem: NEUROSENSORY - ADULT  Goal: Achieves stable or improved neurological status  Description  INTERVENTIONS  - Monitor and report changes in neurological status  - Monitor vital signs such as temperature, blood pressure, glucose, and any other labs ordered   - Initiate measures to prevent increased intracranial pressure  - Monitor for seizure activity and implement precautions if appropriate      Outcome: Progressing  Goal: Remains free of injury related to seizures activity  Description  INTERVENTIONS  - Maintain airway, patient safety  and administer oxygen as ordered  - Monitor patient for seizure activity, document and report duration and description of seizure to physician/advanced practitioner  - If seizure occurs,  ensure patient safety during seizure  - Reorient patient post seizure  - Seizure pads on all 4 side rails  - Instruct patient/family to notify RN of any seizure activity including if an aura is experienced  - Instruct patient/family to call for assistance with activity based on nursing assessment  - Administer anti-seizure medications if ordered    Outcome: Progressing  Goal: Achieves maximal functionality and self care  Description  INTERVENTIONS  - Monitor swallowing and airway patency with patient fatigue and changes in neurological status  - Encourage and assist patient to increase activity and self care     - Encourage visually impaired, hearing impaired and aphasic patients to use assistive/communication devices  Outcome: Progressing     Problem: CARDIOVASCULAR - ADULT  Goal: Maintains optimal cardiac output and hemodynamic stability  Description  INTERVENTIONS:  - Monitor I/O, vital signs and rhythm  - Monitor for S/S and trends of decreased cardiac output  - Administer and titrate ordered vasoactive medications to optimize hemodynamic stability  - Assess quality of pulses, skin color and temperature  - Assess for signs of decreased coronary artery perfusion  - Instruct patient to report change in severity of symptoms  Outcome: Progressing  Goal: Absence of cardiac dysrhythmias or at baseline rhythm  Description  INTERVENTIONS:  - Continuous cardiac monitoring, vital signs, obtain 12 lead EKG if ordered  - Administer antiarrhythmic and heart rate control medications as ordered  - Monitor electrolytes and administer replacement therapy as ordered  Outcome: Progressing     Problem: RESPIRATORY - ADULT  Goal: Achieves optimal ventilation and oxygenation  Description  INTERVENTIONS:  - Assess for changes in respiratory status  - Assess for changes in mentation and behavior  - Position to facilitate oxygenation and minimize respiratory effort  - Oxygen administered by appropriate delivery if ordered  - Initiate smoking cessation education as indicated  - Encourage broncho-pulmonary hygiene including cough, deep breathe, Incentive Spirometry  - Assess the need for suctioning and aspirate as needed  - Assess and instruct to report SOB or any respiratory difficulty  - Respiratory Therapy support as indicated  Outcome: Progressing     Problem: GASTROINTESTINAL - ADULT  Goal: Maintains or returns to baseline bowel function  Description  INTERVENTIONS:  - Assess bowel function  - Encourage oral fluids to ensure adequate hydration  - Administer IV fluids if ordered to ensure adequate hydration  - Administer ordered medications as needed  - Encourage mobilization and activity  - Consider nutritional services referral to assist patient with adequate nutrition and appropriate food choices  Outcome: Progressing     Problem: GENITOURINARY - ADULT  Goal: Maintains or returns to baseline urinary function  Description  INTERVENTIONS:  - Assess urinary function  - Encourage oral fluids to ensure adequate hydration if ordered  - Administer IV fluids as ordered to ensure adequate hydration  - Administer ordered medications as needed  - Offer frequent toileting  - Follow urinary retention protocol if ordered  Outcome: Progressing     Problem: METABOLIC, FLUID AND ELECTROLYTES - ADULT  Goal: Electrolytes maintained within normal limits  Description  INTERVENTIONS:  - Monitor labs and assess patient for signs and symptoms of electrolyte imbalances  - Administer electrolyte replacement as ordered  - Monitor response to electrolyte replacements, including repeat lab results as appropriate  - Instruct patient on fluid and nutrition as appropriate  Outcome: Progressing  Goal: Fluid balance maintained  Description  INTERVENTIONS:  - Monitor labs   - Monitor I/O and WT  - Instruct patient on fluid and nutrition as appropriate  - Assess for signs & symptoms of volume excess or deficit  Outcome: Progressing  Goal: Glucose maintained within target range  Description  INTERVENTIONS:  - Monitor Blood Glucose as ordered  - Assess for signs and symptoms of hyperglycemia and hypoglycemia  - Administer ordered medications to maintain glucose within target range  - Assess nutritional intake and initiate nutrition service referral as needed  Outcome: Progressing     Problem: SKIN/TISSUE INTEGRITY - ADULT  Goal: Skin integrity remains intact  Description  INTERVENTIONS  - Identify patients at risk for skin breakdown  - Assess and monitor skin integrity  - Assess and monitor nutrition and hydration status  - Monitor labs (i e  albumin)  - Assess for incontinence   - Turn and reposition patient  - Assist with mobility/ambulation  - Relieve pressure over bony prominences  - Avoid friction and shearing  - Provide appropriate hygiene as needed including keeping skin clean and dry  - Evaluate need for skin moisturizer/barrier cream  - Collaborate with interdisciplinary team (i e  Nutrition, Rehabilitation, etc )   - Patient/family teaching  Outcome: Progressing  Goal: Incision(s), wounds(s) or drain site(s) healing without S/S of infection  Description  INTERVENTIONS  - Assess and document risk factors for skin impairment   - Assess and document dressing, incision, wound bed, drain sites and surrounding tissue  - Consider nutrition services referral as needed  - Oral mucous membranes remain intact  - Provide patient/ family education  Outcome: Progressing  Goal: Oral mucous membranes remain intact  Description  INTERVENTIONS  - Assess oral mucosa and hygiene practices  - Implement preventative oral hygiene regimen  - Implement oral medicated treatments as ordered  - Initiate Nutrition services referral as needed  Outcome: Progressing     Problem: HEMATOLOGIC - ADULT  Goal: Maintains hematologic stability  Description  INTERVENTIONS  - Assess for signs and symptoms of bleeding or hemorrhage  - Monitor labs  - Administer supportive blood products/factors as ordered and appropriate  Outcome: Progressing     Problem: MUSCULOSKELETAL - ADULT  Goal: Maintain or return mobility to safest level of function  Description  INTERVENTIONS:  - Assess patient's ability to carry out ADLs; assess patient's baseline for ADL function and identify physical deficits which impact ability to perform ADLs (bathing, care of mouth/teeth, toileting, grooming, dressing, etc )  - Assess/evaluate cause of self-care deficits   - Assess range of motion  - Assess patient's mobility  - Assess patient's need for assistive devices and provide as appropriate  - Encourage maximum independence but intervene and supervise when necessary  - Involve family in performance of ADLs  - Assess for home care needs following discharge   - Consider OT consult to assist with ADL evaluation and planning for discharge  - Provide patient education as appropriate  Outcome: Progressing  Goal: Maintain proper alignment of affected body part  Description  INTERVENTIONS:  - Support, maintain and protect limb and body alignment  - Provide patient/ family with appropriate education  Outcome: Progressing     Problem: PAIN - ADULT  Goal: Verbalizes/displays adequate comfort level or baseline comfort level  Description  Interventions:  - Encourage patient to monitor pain and request assistance  - Assess pain using appropriate pain scale  - Administer analgesics based on type and severity of pain and evaluate response  - Implement non-pharmacological measures as appropriate and evaluate response  - Consider cultural and social influences on pain and pain management  - Notify physician/advanced practitioner if interventions unsuccessful or patient reports new pain  Outcome: Progressing     Problem: INFECTION - ADULT  Goal: Absence or prevention of progression during hospitalization  Description  INTERVENTIONS:  - Assess and monitor for signs and symptoms of infection  - Monitor lab/diagnostic results  - Monitor all insertion sites, i e  indwelling lines, tubes, and drains  - Monitor endotracheal if appropriate and nasal secretions for changes in amount and color  - [place] appropriate cooling/warming therapies per order  - Administer medications as ordered  - Instruct and encourage patient and family to use good hand hygiene technique  - Identify and instruct in appropriate isolation precautions for identified infection/condition  Outcome: Progressing  Goal: Absence of fever/infection during neutropenic period  Description  INTERVENTIONS:  - Monitor WBC    Outcome: Progressing     Problem: SAFETY ADULT  Goal: Patient will remain free of falls  Description  INTERVENTIONS:  - Assess patient frequently for physical needs  -  Identify cognitive and physical deficits and behaviors that affect risk of falls    -  [place] fall precautions as indicated by assessment   - Educate patient/family on patient safety including physical limitations  - Instruct patient to call for assistance with activity based on assessment  - Modify environment to reduce risk of injury  - Consider OT/PT consult to assist with strengthening/mobility  Outcome: Progressing  Goal: Maintain or return to baseline ADL function  Description  INTERVENTIONS:  -  Assess patient's ability to carry out ADLs; assess patient's baseline for ADL function and identify physical deficits which impact ability to perform ADLs (bathing, care of mouth/teeth, toileting, grooming, dressing, etc )  - Assess/evaluate cause of self-care deficits   - Assess range of motion  - Assess patient's mobility; develop plan if impaired  - Assess patient's need for assistive devices and provide as appropriate  - Encourage maximum independence but intervene and supervise when necessary  - Involve family in performance of ADLs  - Assess for home care needs following discharge   - Consider OT consult to assist with ADL evaluation and planning for discharge  - Provide patient education as appropriate  Outcome: Progressing  Goal: Maintain or return mobility status to optimal level  Description  INTERVENTIONS:  - Assess patient's baseline mobility status (ambulation, transfers, stairs, etc )    - Identify cognitive and physical deficits and behaviors that affect mobility  - Identify mobility aids required to assist with transfers and/or ambulation (gait belt, sit-to-stand, lift, walker, cane, etc )  - [place] fall precautions as indicated by assessment  - Record patient progress and toleration of activity level on Mobility SBAR; progress patient to next Phase/Stage  - Instruct patient to call for assistance with activity based on assessment  - Consider rehabilitation consult to assist with strengthening/weightbearing, etc   Outcome: Progressing     Problem: DISCHARGE PLANNING  Goal: Discharge to home or other facility with appropriate resources  Description  INTERVENTIONS:  - Identify barriers to discharge w/patient and caregiver  - Arrange for needed discharge resources and transportation as appropriate  - Identify discharge learning needs (meds, wound care, etc )  - Arrange for interpretive services to assist at discharge as needed  - Refer to Case Management Department for coordinating discharge planning if the patient needs post-hospital services based on physician/advanced practitioner order or complex needs related to functional status, cognitive ability, or social support system  Outcome: Progressing     Problem: Knowledge Deficit  Goal: Patient/family/caregiver demonstrates understanding of disease process, treatment plan, medications, and discharge instructions  Description  Complete learning assessment and assess knowledge base    Interventions:  - Provide teaching at level of understanding  - Provide teaching via preferred learning methods  Outcome: Progressing

## 2019-11-07 NOTE — OP NOTE
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]    Procedure(s) (LRB):    Revision transnasal transsphenoidal resection of pituitary mass with abdominal fat graft and nasoseptal flap using image guidance    Specimen(s):  ID Type Source Tests Collected by Time Destination   1 : Pituitary mass Tissue Brain TISSUE EXAM Chintan Vicente MD 2019 1336    2 : Pituitary mass Tissue Brain TISSUE EXAM Chintan Vicente MD 2019 1435        Estimated Blood Loss:   Minimal    Drains:  Urethral Catheter Latex 16 Fr  (Active)   Collection Container Standard drainage bag 2019 11:55 AM   Number of days: 0       Urethral Catheter Double-lumen;Non-latex 18 Fr  (Active)   Number of days: 0       Anesthesia Type:   General    Operative Indications:  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]  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]      Operative Findings:  1  Evidence of prior transseptal surgical approach  2  Fibrous pituitary macroadenoma    Complications:   None    Procedure and Technique:  The patient was identified and brought into the operating room and place on the operating table  General anesthesia was induced  The patient was prepped in the usual fashion  The image guidance system was calibrated and seen to be accurate  The nasal cavity was packed with 4% soaked cocaine pledgets  The patient was draped in the usual fashion  A time out was performed and the operation was begun  The cocaine pledgets were removed and a 0 degree endoscope was introduced into the nose    The region of the left sphenopalatine arteries and nasal septum was injected with 1% lidocaine with 1:100,000 epinephrine for a measure of hemostasis  The middle and superior turbinates were gently lateralized with a freer elevator  The space between the turbinates and septum was then decongested with thrombin and epinephrine soaked pledges, revealing the natural ostium of the sphenoid sinus  This was confirmed with image guidance  Next, a nasoseptal flap was harvested from the right nasal septal mucosa  This was a revision procedure  As a result prior transseptal approach had been performed leading to absence of part of the quadrangular cartilage and perpendicular plate of the ethmoid  This resulted in scarring between the mucoperichondrial flaps  As a result, this increased the operative difficulty and length of the procedure by 50%  Three cuts were made with needle tipped cautery, creating a rectangular flap pedicled off the nasal septal artery  The mucosal flap was dissected from the nasal septum in a submucoperichondrial plane using a freer elevator  The upper horizontal cut was begun posteriorly at the height of the natural ostium of the sphenoid sinus  After dissection, the flap was stored in the nasopharynx for use later in the case  Next, a partial posterior septectomy was performed  The anterior cut was made at a point just posterior to the head of the inferior turbinate along the septum with a freer elevator  The bony perpendicular plate of the ethmoid was  from the cribriform region sharply and removed from the nose for skull base reconstruction later in the case  The remaining posterior mucoperichondrial flaps were then removed with a microdebrider, cauterizing their free edges with suction cautery  This revealed the sphenoid keel and sphenoid ostia  The midline was again confirmed with image guidance  The endoscope was then placed on a scope bai    The ostia were widened with a Kerr[place] rongeur and the keel was drilled away with a high speed drill using a cutting nima  The intersinus septum was reduced in a similar fashion  This revealed the bony wall of the sella, as confirmed with image guidance  Again, hemostasis was achieved with suction cautery, saline irrigation and thrombin and epinephrine pledgets  Sphenoid sinus mucosa was removed in order to prevent a mucocele once the flap was inset  At this point, Dr Mirian Pichardo performed the tumor removal, to be dictated separately  After tumor removal, the skull base was reconstructed as described in Dr Kurtis Alvarez note  The reconstruction was then completed by me  The nasoseptal flap was rotated into position, mucosal side out, to cover the sellar skull base defect and duragraft repair  This mucosal flap filled the sphenoid sinus on the left  It was pressed flush against the skull base using a neuropatty under endoscopic guidance  The sphenoid was then filled with duraseal, covering all free edges of the graft  Finally, the nasal cavity free mucosal edges were again cauterized with suction cautery and Floseal was used to fill the nasal cavity  A Sesay balloon was inserted into the nasal cavity and inflated to 15 ml with saline  Telfa was placed in either nasal cavity bilaterally  The patient was then awakened from general anesthesia and transported to the PACU in stable condition        I was present for the entire procedure    Patient Disposition:  PACU     SIGNATURE: Chloe Irwin MD  DATE: November 7, 2019  TIME: 4:05 PM

## 2019-11-07 NOTE — CONSULTS
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     History of Present Illness:  Jose Francisco Stokes is a 49 y o  female  With past medical history of pituitary adenoma status post transsphenoidal transnasal resection in  at [facility]  Notes were suggestive that this was a growth hormone secreting for trophic tumor but no clinical or lab evidence  He has been following at [facility]  recently she saw her PCP, blood tests and pituitary panel all appeared to be normal with no evidence of elevated IGF 1  MRI scan in May 2019 showed considerable mass within the sella 2 6 x 1 9 x 1 5 displacing the optic chiasm  She was evaluated by Ophthalmology in 2019 no evidence of bitemporal field loss although some superior nasal field loss in the left eye and some circumferential peripheral loss in the right eye more than the left  patient was evaluated by Neurosurgery and surgical resection was recommended  she underwent image guided endoscopic transnasal transsphenoidal resection of pituitary mass today  Awakened from general anesthesia and extubated and was admitted to the ICU for observation  History obtained from chart review       Past Medical History:  Past Medical History:   Diagnosis Date    GERD (gastroesophageal reflux disease)     Hypercholesteremia     Hypertension     Pituitary tumor         Past Surgical History:  Past Surgical History:   Procedure Laterality Date     SECTION      TRANSPHENOIDAL / TRANSNASAL HYPOPHYSECTOMY / RESECTION PITUITARY TUMOR          Past Family History:  Family History   Problem Relation Age of Onset    Cancer Mother     Heart disease Father     Hypertension Father     No Known Problems Daughter  No Known Problems Maternal Aunt     Leukemia Brother         Social History:  Social History     Tobacco Use   Smoking Status Never Smoker   Smokeless Tobacco Never Used     Social History     Substance and Sexual Activity   Alcohol Use Not Currently    Frequency: Monthly or less     Social History     Substance and Sexual Activity   Drug Use Not Currently     Marital Status: Single       Home Medications:   Prior to Admission medications    Medication Sig Start Date End Date Taking?  Authorizing Provider   aspirin 81 mg chewable tablet Chew 81 mg daily   Yes Historical Provider, MD   atorvastatin (LIPITOR) 20 mg tablet Take 20 mg by mouth daily   Yes Historical Provider, MD   hydrochlorothiazide (HYDRODIURIL) 12 5 mg tablet Take 12 5 mg by mouth daily   Yes Historical Provider, MD   metoprolol succinate (TOPROL-XL) 100 mg 24 hr tablet 100 mg daily  9/12/19  Yes Historical Provider, MD   meclizine (ANTIVERT) 12 5 MG tablet Take 2 tablets (25 mg total) by mouth 3 (three) times a day as needed for dizziness 8/20/19   Bairon Whelan DO        Inpatient Medications:  Scheduled Meds:  Current Facility-Administered Medications:  acetaminophen 975 mg Oral [facility] Gege Almonte MD    [START ON 11/8/2019] atorvastatin 20 mg Oral Daily Gege Almonte MD    bisacodyl 10 mg Rectal Daily PRN MD Nirmal Floyd ON 11/8/2019] cefazolin 1,000 mg Intravenous Q8H Gege Almonte MD    docusate sodium 100 mg Oral BID Gege Almonte MD    hydrALAZINE 10 mg Intravenous Q6H PRN Melissa Kelley MD    [START ON 11/8/2019] hydrochlorothiazide 12 5 mg Oral Daily Gege Almonte MD    HYDROmorphone 0 5 mg Intravenous Q1H PRN Gege Almonte MD    Labetalol HCl 10 mg Intravenous Q6H PRN Melissa Kelley MD    meclizine 25 mg Oral TID PRN Gege Almonte MD    [START ON 11/8/2019] metoprolol succinate 100 mg Oral Daily Gege Almonte MD    naloxone 0 04 mg Intravenous Q1MIN PRN Gege Almonte MD    ondansetron 4 mg Intravenous Q6H PRN Bryce Mccormick MD    oxyCODONE 10 mg Oral Q4H PRN Bryce Mccormick MD    oxyCODONE 5 mg Oral Q4H PRN Bryce Mccormick MD    sodium chloride 100 mL/hr Intravenous Continuous Bryce Mccormick MD Last Rate: 100 mL/hr (19)     Continuous Infusions:  sodium chloride 100 mL/hr Last Rate: 100 mL/hr (19)     PRN Meds:    bisacodyl 10 mg Daily PRN   hydrALAZINE 10 mg Q6H PRN   HYDROmorphone 0 5 mg Q1H PRN   Labetalol HCl 10 mg Q6H PRN   meclizine 25 mg TID PRN   naloxone 0 04 mg Q1MIN PRN   ondansetron 4 mg Q6H PRN   oxyCODONE 10 mg Q4H PRN   oxyCODONE 5 mg Q4H PRN        Allergies:  No Known Allergies     ROS:   Review of Systems   Respiratory: Negative for cough, chest tightness and wheezing  Cardiovascular: Negative for chest pain and palpitations  Gastrointestinal: Negative for abdominal pain, constipation and vomiting  Neurological: Positive for headaches  Negative for dizziness and weakness  Psychiatric/Behavioral: Negative for confusion  Vitals:  Vitals:    19 1615 19 1630 19 1645 19 1719   BP: 137/81 132/82 136/84    Pulse: 75 82 74 70   Resp: 15 16 19 13   Temp:   97 5 °F (36 4 °C) 98 °F (36 7 °C)   TempSrc:   Temporal Oral   SpO2: 98% 99% 99% 99%   Weight:       Height:         Temperature:   Temp (24hrs), Av 8 °F (36 6 °C), Min:96 8 °F (36 °C), Max:98 7 °F (37 1 °C)    Current Temperature: 98 °F (36 7 °C)     Weights:   IBW: 52 4 kg  Body mass index is 34 9 kg/m²  Non-Invasive/Invasive Ventilation Settings:  Respiratory    Lab Data (Last 4 hours)    None         O2/Vent Data (Last 4 hours)    None              No results found for: PHART, KFA0BLF, PO2ART, WYA6KYF, Z4YEDYHV, BEART, SOURCE  SpO2: SpO2: 99 %     Physical Exam:  Physical Exam   Constitutional: She is oriented to person, place, and time  She appears well-developed and well-nourished  HENT:   Head: Normocephalic and atraumatic     Eyes: Pupils are equal, round, and reactive to light  EOM are normal    Cardiovascular: Normal rate, regular rhythm and normal heart sounds  Exam reveals no friction rub  No murmur heard  Pulmonary/Chest: No respiratory distress  She has no wheezes  Abdominal: Soft  She exhibits no distension  There is no tenderness  Musculoskeletal: She exhibits no edema  Neurological: She is alert and oriented to person, place, and time  No cranial nerve deficit or sensory deficit  Skin: Skin is warm  Labs: Invalid input(s):  EOSPCT       Invalid input(s): LABALBU                   0   Lab Value Date/Time    TROPONINI <0 02 07/01/2019 2250    TROPONINI <0 02 04/09/2019 0737    TROPONINI <0 02 04/09/2019 0406    TROPONINI <0 02 04/09/2019 0011        Imaging:  I have personally reviewed pertinent reports  EKG:   Normal sinus rhythm  This was personally reviewed by myself  Micro:  No results found for: Higinio Blanchard    Assessment:     49years old female admitted from the OR status post image guided endoscopic transnasal transsphenoidal resection of pituitary mass  Plan:                  Neuro:     Pituitary macroadenoma status post resection  -  Frequent neuro checks q 1 hour  -  Elevate head of the bed  -  CT head without contrast am            Analgesia  -  Tylenol 975 mg Q 8  -  Oxycodone 5 mg Q 4 p r n  For moderate pain  -  Oxycodone 10 mg Q 4 p r n  For severe pain  -  Dilaudid 0 5 mg q 1 p r n  For breakthrough pain                 CV:   -  Monitor blood pressure keep MAP <110  -  Metoprolol succinate 100 mg daily and hydrochlorothiazide 12 5 mg daily  -  Hydralazine 10 mg IV q 6 p r n  For  MAP more than 110  -  Labetalol IV 10 mg Q 6 p r n  For MAP greater than 110                 Lung:    no active issues                 GI:    bowel management with Colace 100 mg b i d  And bisacodyl p r n  Zofran 4 mg injection Q 6 p r n   For nausea and vomiting                 FEN:    normal saline 100 ml/hour could be dc if tolerating oral diet   keep potassium more than 4, magnesium more than 2, phosphorus more than 3   regular house diet                 :    Sesay catheter in place  day 1                 ID:    perioperative antibiotics Ancef given                 Heme:    DVT prophylaxis with SCDs                 Endo:    monitor blood glucose                 Msk/Skin:    PT OT                 Disposition:  ICU     VTE Pharmacologic Prophylaxis: Sequential compression device (Venodyne)   VTE Mechanical Prophylaxis: sequential compression device     Invasive lines and devices: Invasive Devices     Peripheral Intravenous Line            Peripheral IV 11/07/19 Left Antecubital less than 1 day    Peripheral IV 11/07/19 Right Hand less than 1 day          Arterial Line            Arterial Line 11/07/19 Right Radial less than 1 day          Drain            Urethral Catheter Double-lumen;Non-latex 18 Fr  less than 1 day    Urethral Catheter Latex 16 Fr  less than 1 day                 Code Status: Prior  POA:    POLST:            Portions of the record may have been created with voice recognition software  Occasional wrong word or "sound a like" substitutions may have occurred due to the inherent limitations of voice recognition software  Read the chart carefully and recognize, using context, where substitutions have occurred          Tiago Farah MD

## 2019-11-07 NOTE — H&P
Otolaryngology -- Head and Neck Surgery New Patient Visit     Afsaneh Avila is a 49 y o  who presents with a chief complaint of pituitary adenoma     Pertinent elements of the history include:  She has a longstanding history of a pituitary adenoma diagnosed and treated in  with surgery  She has been followed with scans over time and there has been noted enlargement  She has some headaches and dizziness (off balance feeling, not vertigo)  She has yet to see optho for visual field testing  Headaches localize to the occiput  No trouble breathing through her nose      Recent cortisol, IGF-1 and prolactin levels were all WNL     She additionally has a second chief complaint  She is bothered by sore throat -- occasional globus sensation and heartburn  Some increased pharyngeal mucus  No hoarseness  Likes spicy foods    Sore throat has been present for "a long time "     Review of systems 10 point review of systems reviewed, as documented on a separate form      Results reviewed; images from any scan have been personally reviewed:     MR brain demonstrates sellar mass with suprasellar extension      The past medical, surgical, social and family history have been reviewed as documented in today's record      Medical History        Past Medical History:   Diagnosis Date    Hypercholesteremia      Hypertension      Pituitary tumor              Surgical History         Past Surgical History:   Procedure Laterality Date     SECTION        TRANSPHENOIDAL / TRANSNASAL HYPOPHYSECTOMY / RESECTION PITUITARY TUMOR                      Family History   Problem Relation Age of Onset    Cancer Mother      Heart disease Father      Hypertension Father      No Known Problems Daughter      No Known Problems Maternal Aunt           Social History               Socioeconomic History    Marital status: Single       Spouse name: Not on file    Number of children: Not on file    Years of education: Not on file  Highest education level: Not on file   Occupational History    Not on file   Social Needs    Financial resource strain: Not on file    Food insecurity:       Worry: Not on file       Inability: Not on file    Transportation needs:       Medical: Not on file       Non-medical: Not on file   Tobacco Use    Smoking status: Never Smoker    Smokeless tobacco: Never Used   Substance and Sexual Activity    Alcohol use: Not Currently       Frequency: Monthly or less    Drug use: Not Currently    Sexual activity: Not on file   Lifestyle    Physical activity:       Days per week: Not on file       Minutes per session: Not on file    Stress: Not on file   Relationships    Social connections:       Talks on phone: Not on file       Gets together: Not on file       Attends [employer] service: Not on file       Active member of club or organization: Not on file       Attends meetings of clubs or organizations: Not on file       Relationship status: Not on file    Intimate partner violence:       Fear of current or ex partner: Not on file       Emotionally abused: Not on file       Physically abused: Not on file       Forced sexual activity: Not on file   Other Topics Concern    Not on file   Social History Narrative    Not on file               Physical exam: (abnormal findings appear in bold and supercede any conflicting normal findings listed below)     /96   Pulse 83   Temp 98 7 °F (37 1 °C) (Tympanic)   Resp 20   Ht 5' 3" (1 6 m)   Wt 89 4 kg (197 lb)   SpO2 99%   BMI 34 90 kg/m²        Constitutional:  Well developed, well nourished and groomed, in no acute distress       Eyes:  Extra-ocular movements intact, pupils equally round and reactive to light and accommodation, the lids and conjunctivae are normal in appearance      Head: Atraumatic, normocephalic, no visible scalp lesions, bony palpation unremarkable without stepoffs, parotid and submandibular salivary glands non-tender to palpation and without masses bilaterally       Ears:  Auricles normal in appearance bilaterally, mastoid prominence non-tender, external auditory canals clear bilaterally, tympanic membranes intact bilaterally without evidence of middle ear effusion or masses, normal appearing ossicles       Nose/Sinuses:  External appearance unremarkable, no maxillary or frontal sinus tenderness to palpation bilaterally  Anterior rhinoscopy reveals: normal mucosa      Oral Cavity:  Moist mucus membranes, gums and dentition unremarkable, no oral mucosal masses or lesions, floor of mouth soft, tongue mobile without masses or lesions       Oropharynx:  Base of tongue soft and without masses, tonsils bilaterally unremarkable, soft palate mucosa unremarkable       Neck:  No visible or palpable cervical lesions or lymphadenopathy, thyroid gland is normal in size and symmetry and without masses, normal laryngeal elevation with swallowing       Cardiovascular:  Normal rate and rhythm, no palpable thrills, no jugulovenous distension observed  Respiratory:  Normal respiratory effort without evidence of retractions or use of accessory muscles  Integument:  Normal appearing without observed masses or lesions  Neurologic:  Cranial nerves II-XII intact bilaterally  Psychiatric:  Alert and oriented to time, place and person, normal affect      Procedures  Flexible nasopharyngolaryngoscopy was performed after nasal topicalization with lidocaine and oxymetazoline  Findings demonstrate:     Nasal cavity:              Clear, no masses, exudates or polyps  Nasopharynx:            Clear, fossae of Rosenmuller clear bilaterally; posterior septum is intact  There is some scarring of the middle turbinates to the septum and evidence of prior sphenoidotomy  Base of tongue:         Clear  Vallecula:                   Empty  Epiglottis:                   Crisp  Arytenoids/folds:       Moderate mucosal erythema and edema  Interaryntenoid:         Moderate mucosal erythema and edema  Postcricoid:               Moderate mucosal erythema and edema  Glottis:                       Normal vocal fold motion, no laryngeal masses  Piriform sinuses:      Clear        Assessment:   1  Laryngopharyngeal reflux (LPR)  omeprazole (PriLOSEC) 40 MG capsule   2  Pituitary adenoma ([address] )            Orders  No orders of the defined types were placed in this encounter            Discussion/Plan:     1  PamelaHas throat symptoms and endoscopic exam findings consistent with laryngopharyngeal reflux  I placed her on omeprazole and admonished her against eating spicy foods which she loves  She will follow up again for this during her postoperative visits following surgery for her pituitary recurrent macroadenoma      2  She has seen neurosurgery already and we jointly recommended a reoperation to re-excise her recurrent pituitary macroadenoma  The risks benefits and alternatives of endoscopic transsphenoidal resection of tumor were discussed at her informed consent was obtained  Those risks include bleeding recurrence scarring need for more surgery loss of her sense of smell CSF leak and vision issues post operatively

## 2019-11-07 NOTE — OP NOTE
Neurosurgery Operative Room Note    Debera Libman  11/7/2019    Pre-op Diagnosis:   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]  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]    Post-op Dignosis:     Post-Op Diagnosis Codes:     * 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]     * 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]    Procedure:  Procedure(s):  Image guided endoscopic transnasal transsphenoidal resection of pituitary mass, abdominal fat graft, possible lumbar drain  IMAGE GUIDED ENDOSCOPIC TRANSNASAL EXPOSURE FOR TRANSSPHENOIDAL SURGERY    Surgeon: Surgeon(s) and Role:  Panel 1:     * Chintan Vicente MD - Primary  Panel 2:     * Trevor Cash MD - Primary     Anesthesia: General    Staff:   Circulator: Isreal Nelson RN  Relief Circulator: Missael Paige RN; Viraj Villagran RN  Relief Scrub: Nathan Ford  Scrub Person: Yumiko Spencer  No qualified Resident was available  Estimated Blood Loss: Minimal    Specimens:                Order Name Source Comment Collection Info Order Time   PREPARE RBC ADULT    11/6/2019 12:29 PM     Has consent been obtained? No          Date of Surgery   11/7/2019          Where is the Surgery Scheduled? [address] [facility]  Collected By: Chintan Vicente MD 11/7/2019  1:36 PM       Drains:   Urethral Catheter Latex 16 Fr  (Active)   Collection Container Standard drainage bag 11/7/2019 11:55 AM       Findings:  Large extensive fibrous mass c/w pitutary adenoma     Complications:  none    OR note:    Indications       60-year-old woman who referred by her PCP,  Dr Rylan Mccall, for recurrent pituitary adenoma  She was seen today in the  Office after seeing Ophthalmology, Dr Stacia Bautista, and undergoing CTA head       patient relates she underwent transsphenoid, transnasal surgery with Dr Manjeet Agarwal  In 2004 at [facility]    Notes from SHANON Hubbard and/or bMobilized this was a growth hormone secreting or trophic tumor, but there is no evidence of this clinically or in her lab values  She since that time had been following at [place] [address], and there was mention in 2017 of recurrence, but the patient deferred on re-resection        She has changed insurance recently, was seen by her PCP  The patient was sent for  Blood testing, pituitary panel, and this all appears to be normal   There is no evidence of elevated IGF 1  She is not acromegalic on exam        MRI scan pituitary done 5/1/19 shows considerable mass within the sella, 2 6 cm x 1 9 cm x 1 5 cm abutting and displacing the optic chiasm      On my exam, her visual fields are intact to confrontation   Ophthalmology results from [address] [place] [address]  From 9/26/19 show no bitemporal field loss, although some superior nasal field loss in the left eye more so than inferior, and some circumferential peripheral loss in the right eye more so than the left          I reviewed the patient   Again options for management  I did not recommend expectant/ watch and wait management, as this will likely progress and cause visual decline in this young woman  As this is not a prolactin secreting tumor, medical management is not an effective option  Radiation therapy would need to be in IMRT format,  And is not ideal with optic chiasm compression  Resection I recommended is her best option  I did extensively discuss expected perioperative experience, attendant risks etc    Specifically, expected benefits as decompression of her optic chiasm, although perhaps not a full gross total resection, per in particular with the size of this mass    Attendant risks include but not limited to infection, bleeding, VTE, spinal fluid leak, pituitary dysfunction such as diabetes insipidus, transient or permanent neurologic dysfunction, etc   She would like to proceed, and written consent obtained        Patient does take 81 mg of aspirin, and is unclear on her metoprolol dosing, both these would need to be clarified, and the aspirin held prior to surgery        Metrics: EQ5D5L 1 000, VA S 80, , ECOG 0, [place] on 8/26/19, 26/30  Details of Procedure      Patient was brought to the OR and after successful induction of general endotracheal anesthesia a Sesay catheter and radial A-line were placed      The patient was supine on the OR table  They were placed in [place] pins  The patient tracker from the Stealth system was affixed to the [place], and the patient's scalp registered to the Stealth workstation  Preoperatively, the Stealth workstation was used to identify pituitary mass, as well as Stealth merge preoperative CT and MRI scans  These images were used throughout the procedure for navigation      The patient's abdomen and nares were prepped and draped in the standard fashion  A timeout was performed  The patient was given antibiotics per protocol  1% lidocaine with epinephrine was given at the abdominal incision site  The skin was incised and dissection carried down with the Bovie  A subcutaneous fat graft was harvested  Hemostasis was achieved  The wound was closed with inverted interrupted 3-0 Vicryl plus suture in the deep dermis and a running subcuticular Monocryl  It was dressed with Steristrips and a large bandaid  Please see the operative note from Dr Yue Kelly for the nasal approach and his procedures      At this point, the sphenoid sinus had been opened, and the anterior sella was visible through the endoscope  Much of the patient's left bony anterior sella had been removed with her prior procedure  The anterior sellar opening was expanded with the Koros Kerrison punch  Epidural hemostasis was achieved with FloSeal  The dura was opened, pale pink fleshy tissue emanated from the dural opening, consistent with pituitary adenoma  This was sampled with a pituitary rongeur and sent for frozen pathology   It did appear consistent with pituitary tissue/adenoma on the frozen, final diagnosis deferred      Using ring curettes I resected mass from within the sella inferiorly as well as left and right  It was relatively firm  Considerable amount of tumor was removed all the way posterior to the back of the bony sella, confirmed with navigation  Extensive tumor was resected superiorly, eventually bringing the diaphragm down into the sella  Additional tumor was removed left and right superiorly  I could not visualize any residual and believe we achieved an excellent decompression  CSF did emanate around the diaphragm from the patient's left  Hemostasis was achieved within the sella with FloSeal     The fat graft was inserted into the sella  A piece of dura repair was cut to fit, ×2, and fashioned as an inlay/onlay button hole patch, secured in the midpoint with a 4-0 Nurolon suture  It was moved in position, and the inlay portion tucked within the dura, and the onlay portion overlaid  It could not be tucked behind bone due to the revision nature of this surgery - there was no utilizable bone for such  There was no obvious leakage of spinal fluid  Dr  Abundio Hdez rotated in his nasoseptal flap over the [facility]   This was secured with DuraSeal and achieved a watertight closure  He placed more FloSeal and then secured the flap with a Sesay catheter balloon       The endoscope was removed, and the drapes were withdrawn      All instrument counts, sponge counts, and needle counts were correct at the completion of the procedure      The patient was taken out of [place] pins, there was no significant bleeding from the pin sites, he was awoken from general endotracheal anesthesia and extubated deep to avoid bucking coughing etc  He was taken to the PACU in cardiovascular stable condition  The revision nature of this procedure did make it more difficult and time consuming that normal  50% more time was required         Nik Carrillo MD     Date: 11/7/2019  Time: 3:43 PM

## 2019-11-08 ENCOUNTER — APPOINTMENT (INPATIENT)
Dept: RADIOLOGY | Facility: HOSPITAL | Age: 54
DRG: 614 | End: 2019-11-08

## 2019-11-08 PROBLEM — E89.3 STATUS POST TRANSSPHENOIDAL PITUITARY RESECTION (HCC): Status: ACTIVE | Noted: 2019-11-08

## 2019-11-08 LAB
ABO GROUP BLD BPU: NORMAL
ANION GAP SERPL CALCULATED.3IONS-SCNC: 6 MMOL/L (ref 4–13)
ANION GAP SERPL CALCULATED.3IONS-SCNC: 8 MMOL/L (ref 4–13)
ANION GAP SERPL CALCULATED.3IONS-SCNC: 9 MMOL/L (ref 4–13)
BPU ID: NORMAL
BUN SERPL-MCNC: 11 MG/DL (ref 5–25)
BUN SERPL-MCNC: 12 MG/DL (ref 5–25)
BUN SERPL-MCNC: 12 MG/DL (ref 5–25)
CALCIUM SERPL-MCNC: 8.5 MG/DL (ref 8.3–10.1)
CALCIUM SERPL-MCNC: 8.9 MG/DL (ref 8.3–10.1)
CALCIUM SERPL-MCNC: 9.1 MG/DL (ref 8.3–10.1)
CHLORIDE SERPL-SCNC: 111 MMOL/L (ref 100–108)
CHLORIDE SERPL-SCNC: 114 MMOL/L (ref 100–108)
CHLORIDE SERPL-SCNC: 117 MMOL/L (ref 100–108)
CO2 SERPL-SCNC: 24 MMOL/L (ref 21–32)
CO2 SERPL-SCNC: 24 MMOL/L (ref 21–32)
CO2 SERPL-SCNC: 25 MMOL/L (ref 21–32)
CREAT SERPL-MCNC: 0.98 MG/DL (ref 0.6–1.3)
CREAT SERPL-MCNC: 1.08 MG/DL (ref 0.6–1.3)
CREAT SERPL-MCNC: 1.08 MG/DL (ref 0.6–1.3)
CROSSMATCH: NORMAL
ERYTHROCYTE [DISTWIDTH] IN BLOOD BY AUTOMATED COUNT: 14.8 % (ref 11.6–15.1)
GFR SERPL CREATININE-BSD FRML MDRD: 68 ML/MIN/1.73SQ M
GFR SERPL CREATININE-BSD FRML MDRD: 68 ML/MIN/1.73SQ M
GFR SERPL CREATININE-BSD FRML MDRD: 76 ML/MIN/1.73SQ M
GLUCOSE SERPL-MCNC: 141 MG/DL (ref 65–140)
GLUCOSE SERPL-MCNC: 152 MG/DL (ref 65–140)
GLUCOSE SERPL-MCNC: 155 MG/DL (ref 65–140)
HCT VFR BLD AUTO: 38.7 % (ref 34.8–46.1)
HGB BLD-MCNC: 12.2 G/DL (ref 11.5–15.4)
MAGNESIUM SERPL-MCNC: 2.4 MG/DL (ref 1.6–2.6)
MCH RBC QN AUTO: 27.2 PG (ref 26.8–34.3)
MCHC RBC AUTO-ENTMCNC: 31.5 G/DL (ref 31.4–37.4)
MCV RBC AUTO: 86 FL (ref 82–98)
OSMOLALITY UR: 133 MMOL/KG
PHOSPHATE SERPL-MCNC: 2.5 MG/DL (ref 2.7–4.5)
PLATELET # BLD AUTO: 234 THOUSANDS/UL (ref 149–390)
PMV BLD AUTO: 12.5 FL (ref 8.9–12.7)
POTASSIUM SERPL-SCNC: 3.4 MMOL/L (ref 3.5–5.3)
POTASSIUM SERPL-SCNC: 3.5 MMOL/L (ref 3.5–5.3)
POTASSIUM SERPL-SCNC: 4 MMOL/L (ref 3.5–5.3)
RBC # BLD AUTO: 4.49 MILLION/UL (ref 3.81–5.12)
SODIUM SERPL-SCNC: 143 MMOL/L (ref 136–145)
SODIUM SERPL-SCNC: 147 MMOL/L (ref 136–145)
SODIUM SERPL-SCNC: 148 MMOL/L (ref 136–145)
SP GR UR STRIP.AUTO: 1 (ref 1–1.03)
UNIT DISPENSE STATUS: NORMAL
UNIT PRODUCT CODE: NORMAL
UNIT RH: NORMAL
WBC # BLD AUTO: 15.04 THOUSAND/UL (ref 4.31–10.16)

## 2019-11-08 PROCEDURE — 83930 ASSAY OF BLOOD OSMOLALITY: CPT | Performed by: PHYSICIAN ASSISTANT

## 2019-11-08 PROCEDURE — 70450 CT HEAD/BRAIN W/O DYE: CPT

## 2019-11-08 PROCEDURE — 99024 POSTOP FOLLOW-UP VISIT: CPT | Performed by: NEUROLOGICAL SURGERY

## 2019-11-08 PROCEDURE — 83935 ASSAY OF URINE OSMOLALITY: CPT | Performed by: INTERNAL MEDICINE

## 2019-11-08 PROCEDURE — G8987 SELF CARE CURRENT STATUS: HCPCS

## 2019-11-08 PROCEDURE — 97166 OT EVAL MOD COMPLEX 45 MIN: CPT

## 2019-11-08 PROCEDURE — 83735 ASSAY OF MAGNESIUM: CPT | Performed by: STUDENT IN AN ORGANIZED HEALTH CARE EDUCATION/TRAINING PROGRAM

## 2019-11-08 PROCEDURE — 97163 PT EVAL HIGH COMPLEX 45 MIN: CPT

## 2019-11-08 PROCEDURE — 81003 URINALYSIS AUTO W/O SCOPE: CPT | Performed by: NEUROLOGICAL SURGERY

## 2019-11-08 PROCEDURE — 80048 BASIC METABOLIC PNL TOTAL CA: CPT | Performed by: PHYSICIAN ASSISTANT

## 2019-11-08 PROCEDURE — 84100 ASSAY OF PHOSPHORUS: CPT | Performed by: STUDENT IN AN ORGANIZED HEALTH CARE EDUCATION/TRAINING PROGRAM

## 2019-11-08 PROCEDURE — G8988 SELF CARE GOAL STATUS: HCPCS

## 2019-11-08 PROCEDURE — 80048 BASIC METABOLIC PNL TOTAL CA: CPT | Performed by: NEUROLOGICAL SURGERY

## 2019-11-08 PROCEDURE — 99233 SBSQ HOSP IP/OBS HIGH 50: CPT | Performed by: EMERGENCY MEDICINE

## 2019-11-08 PROCEDURE — G8979 MOBILITY GOAL STATUS: HCPCS

## 2019-11-08 PROCEDURE — G8978 MOBILITY CURRENT STATUS: HCPCS

## 2019-11-08 PROCEDURE — 83935 ASSAY OF URINE OSMOLALITY: CPT | Performed by: PHYSICIAN ASSISTANT

## 2019-11-08 PROCEDURE — 85027 COMPLETE CBC AUTOMATED: CPT | Performed by: NEUROLOGICAL SURGERY

## 2019-11-08 RX ORDER — HEPARIN SODIUM 5000 [USP'U]/ML
5000 INJECTION, SOLUTION INTRAVENOUS; SUBCUTANEOUS EVERY 8 HOURS SCHEDULED
Status: DISCONTINUED | OUTPATIENT
Start: 2019-11-08 | End: 2019-11-11 | Stop reason: HOSPADM

## 2019-11-08 RX ORDER — HYDRALAZINE HYDROCHLORIDE 20 MG/ML
10 INJECTION INTRAMUSCULAR; INTRAVENOUS EVERY 4 HOURS PRN
Status: DISCONTINUED | OUTPATIENT
Start: 2019-11-08 | End: 2019-11-08

## 2019-11-08 RX ORDER — LABETALOL 20 MG/4 ML (5 MG/ML) INTRAVENOUS SYRINGE
10 EVERY 4 HOURS PRN
Status: DISCONTINUED | OUTPATIENT
Start: 2019-11-08 | End: 2019-11-08

## 2019-11-08 RX ORDER — AMLODIPINE BESYLATE 10 MG/1
10 TABLET ORAL DAILY
Status: DISCONTINUED | OUTPATIENT
Start: 2019-11-08 | End: 2019-11-11 | Stop reason: HOSPADM

## 2019-11-08 RX ORDER — POTASSIUM CHLORIDE 20 MEQ/1
40 TABLET, EXTENDED RELEASE ORAL ONCE
Status: COMPLETED | OUTPATIENT
Start: 2019-11-08 | End: 2019-11-08

## 2019-11-08 RX ORDER — SENNOSIDES 8.6 MG
2 TABLET ORAL
Status: DISCONTINUED | OUTPATIENT
Start: 2019-11-08 | End: 2019-11-11 | Stop reason: HOSPADM

## 2019-11-08 RX ORDER — HYDRALAZINE HYDROCHLORIDE 20 MG/ML
10 INJECTION INTRAMUSCULAR; INTRAVENOUS EVERY 4 HOURS PRN
Status: DISCONTINUED | OUTPATIENT
Start: 2019-11-08 | End: 2019-11-11 | Stop reason: HOSPADM

## 2019-11-08 RX ORDER — CEFAZOLIN SODIUM 2 G/50ML
2000 SOLUTION INTRAVENOUS EVERY 8 HOURS
Status: DISCONTINUED | OUTPATIENT
Start: 2019-11-08 | End: 2019-11-11 | Stop reason: HOSPADM

## 2019-11-08 RX ORDER — LABETALOL 20 MG/4 ML (5 MG/ML) INTRAVENOUS SYRINGE
10 EVERY 4 HOURS PRN
Status: DISCONTINUED | OUTPATIENT
Start: 2019-11-08 | End: 2019-11-11 | Stop reason: HOSPADM

## 2019-11-08 RX ORDER — ACETAMINOPHEN 325 MG/1
TABLET ORAL
Status: COMPLETED
Start: 2019-11-08 | End: 2019-11-08

## 2019-11-08 RX ORDER — LABETALOL 20 MG/4 ML (5 MG/ML) INTRAVENOUS SYRINGE
Status: COMPLETED
Start: 2019-11-08 | End: 2019-11-08

## 2019-11-08 RX ORDER — METRONIDAZOLE 500 MG/1
500 TABLET ORAL EVERY 8 HOURS SCHEDULED
Status: DISCONTINUED | OUTPATIENT
Start: 2019-11-08 | End: 2019-11-11 | Stop reason: HOSPADM

## 2019-11-08 RX ORDER — POTASSIUM CHLORIDE 20MEQ/15ML
40 LIQUID (ML) ORAL ONCE
Status: COMPLETED | OUTPATIENT
Start: 2019-11-08 | End: 2019-11-08

## 2019-11-08 RX ADMIN — ATORVASTATIN CALCIUM 20 MG: 20 TABLET, FILM COATED ORAL at 09:20

## 2019-11-08 RX ADMIN — HEPARIN SODIUM 5000 UNITS: 5000 INJECTION INTRAVENOUS; SUBCUTANEOUS at 21:58

## 2019-11-08 RX ADMIN — HYDRALAZINE HYDROCHLORIDE 10 MG: 20 INJECTION INTRAMUSCULAR; INTRAVENOUS at 10:54

## 2019-11-08 RX ADMIN — ACETAMINOPHEN 975 MG: 325 TABLET ORAL at 13:36

## 2019-11-08 RX ADMIN — METRONIDAZOLE 500 MG: 500 TABLET, FILM COATED ORAL at 15:56

## 2019-11-08 RX ADMIN — METOPROLOL SUCCINATE 100 MG: 100 TABLET, EXTENDED RELEASE ORAL at 09:20

## 2019-11-08 RX ADMIN — DOCUSATE SODIUM 100 MG: 100 CAPSULE, LIQUID FILLED ORAL at 18:24

## 2019-11-08 RX ADMIN — HYDRALAZINE HYDROCHLORIDE 10 MG: 20 INJECTION INTRAMUSCULAR; INTRAVENOUS at 18:24

## 2019-11-08 RX ADMIN — AMLODIPINE BESYLATE 10 MG: 10 TABLET ORAL at 15:56

## 2019-11-08 RX ADMIN — OXYCODONE HYDROCHLORIDE 5 MG: 5 TABLET ORAL at 18:37

## 2019-11-08 RX ADMIN — SENNOSIDES 17.2 MG: 8.6 TABLET, FILM COATED ORAL at 21:50

## 2019-11-08 RX ADMIN — ACETAMINOPHEN 975 MG: 325 TABLET ORAL at 05:01

## 2019-11-08 RX ADMIN — LABETALOL 20 MG/4 ML (5 MG/ML) INTRAVENOUS SYRINGE 10 MG: at 12:55

## 2019-11-08 RX ADMIN — LABETALOL 20 MG/4 ML (5 MG/ML) INTRAVENOUS SYRINGE 10 MG: at 09:17

## 2019-11-08 RX ADMIN — ACETAMINOPHEN 975 MG: 325 TABLET ORAL at 21:51

## 2019-11-08 RX ADMIN — POTASSIUM CHLORIDE 40 MEQ: 20 SOLUTION ORAL at 21:51

## 2019-11-08 RX ADMIN — Medication 1 SPRAY: at 05:02

## 2019-11-08 RX ADMIN — HYDRALAZINE HYDROCHLORIDE 10 MG: 20 INJECTION INTRAMUSCULAR; INTRAVENOUS at 21:59

## 2019-11-08 RX ADMIN — POTASSIUM CHLORIDE 40 MEQ: 1500 TABLET, EXTENDED RELEASE ORAL at 18:24

## 2019-11-08 RX ADMIN — CEFAZOLIN SODIUM 2000 MG: 2 SOLUTION INTRAVENOUS at 13:38

## 2019-11-08 RX ADMIN — CEFAZOLIN SODIUM 1000 MG: 1 SOLUTION INTRAVENOUS at 08:33

## 2019-11-08 RX ADMIN — CEFAZOLIN SODIUM 2000 MG: 2 SOLUTION INTRAVENOUS at 21:54

## 2019-11-08 RX ADMIN — HYDROCHLOROTHIAZIDE 12.5 MG: 12.5 TABLET ORAL at 09:21

## 2019-11-08 RX ADMIN — METRONIDAZOLE 500 MG: 500 TABLET, FILM COATED ORAL at 21:50

## 2019-11-08 RX ADMIN — POTASSIUM & SODIUM PHOSPHATES POWDER PACK 280-160-250 MG 2 PACKET: 280-160-250 PACK at 09:20

## 2019-11-08 RX ADMIN — DOCUSATE SODIUM 100 MG: 100 CAPSULE, LIQUID FILLED ORAL at 09:21

## 2019-11-08 NOTE — ASSESSMENT & PLAN NOTE
Neuro:    POD 1 s/p Image guided endoscopic transnasal transsphenoidal resection of pituitary mass, abdominal fat graft  Had intraop leak that was sealed  No Lumbar drain in place  · Continue regular neurologic checks  · Imaging reviewed personally and by attending  Final results as below  · CT head wo 11/8/19: Expected post op changes s/p pituitary mass resection, no significant/large hemorrhage noted post op  · Pain control per primary team      ENT: No use of straws, no sneezing or blowing the nose- discussed with pt  Pt has dowell tube in nose that should remain in place x 2 days per ENT  Monitor drainage from nose: please notify neurosurgery if pt draining a lot from nose  Pulm: no incentive spirometry use  :  Strict monitoring of Inputs and outputs  Pt had dowell removed today  Heme:   WBC 11/8: slightly elevated at 15 04, likely reactive  Hgb wnl at 12 2  BMP 11/8: Na elevated at 148, continue to monitor  K wnl at 4 0  Continue to monitor  Endo:   · Continue to monitor DI  · Net I/O + 715/ last 24 hrs  · 11/8/19 Na elevated at 148  · 11/8/19 Urine specific gravity wnl at 1 005  · Urine Osmolality pending  FEN:  · Fluids: Pt tolerating oral fluid intake  · Nutrition: Pt tolerating oral intake  MSK/SKIN:   · Eval and mobilize per PT/OT  · DVT PPX: SCDs  Will await final read on [address] prior to initiation of DVT ppx  Rounding completed with Lorraine Brochure at bedside     Neurosurgery will continue to follow as primary  Please call with any questions or concerns

## 2019-11-08 NOTE — PHYSICAL THERAPY NOTE
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]    Past Medical History:   Diagnosis Date    GERD (gastroesophageal reflux disease)     Hypercholesteremia     Hypertension     Pituitary tumor      Length Of Stay: 1  PHYSICAL THERAPY EVALUATION :    11/08/19 1045   Note Type   Note type Eval/Treat   Pain Assessment   Pain Assessment No/denies pain   Pain Score No Pain   Home Living   Type of Home House  (2 SH, 5 GEORGETTE; bilevel 5 up or 5 down from enterance)   Home Layout Two level;Stairs to enter with rails;Bed/bath upstairs   Bathroom Shower/Tub Tub/shower unit   Bathroom Toilet Standard   Bathroom Equipment Grab bars in shower   [address]   (NO AD or DME prior to admission)   Additional Comments Pt  lives with son and Daughter in law in a 2 SH,  with 5 GEORGETTE    Prior Function   Level of [place] Independent with ADLs and functional mobility   Lives With [employer] Help From Family   ADL Assistance Independent   IADLs Independent   Falls in the last 6 months 0   Vocational Full time employment   Comments PTA, Pt reports INDEP with ADLs, IADLs and functional mobility without an AD  Restrictions/Precautions   Other Precautions Bed Alarm; Fall Risk;Pain;Telemetry;Multiple lines;Visual impairment   General   Additional Pertinent History Pt  is a 48 yo F who presents s/p Transnasal pituitary Mass resection  Family/Caregiver Present No   Cognition   Overall Cognitive Status WFL   Arousal/Participation Cooperative   Orientation Level Oriented X4   Memory Within functional limits   Following Commands Follows multistep commands with increased time or repetition   Comments Pt   Identified with full name and birthdate, Lethargic throughout session however coooperative  RLE Assessment   RLE Assessment   (functionally > 3+/5)   LLE Assessment   LLE Assessment   (functionally > 3+/5)   Coordination   Movements are Fluid and Coordinated 0   Coordination and Movement Description mild gait ataxia noted throughout session   Sensation WFL   Light Touch   RLE Light Touch Grossly intact   LLE Light Touch Grossly intact   Bed Mobility   Supine to Sit Unable to assess   Sit to Supine Unable to assess   Additional Comments Pt  seated OOB in chair prior to PT session   Transfers   Sit to Stand 5  Supervision   Additional items Assist x 1; Increased time required;Verbal cues  (for hand placement and sequencing)   Stand to Sit 5  Supervision   Additional items Assist x 1; Impulsive;Verbal cues  (to reach back to control descent)   Ambulation/Elevation   Gait pattern Improper Weight shift;Narrow AMERICO; Forward Flexion;Decreased foot clearance;Shuffling; Inconsistent corinne; Short stride;Redundant gait at times; Step to;Excessively slow   Gait Assistance 5  Supervision   Additional items Assist x 1;Verbal cues  (AD managment and gait mechanics)   Assistive Device Rolling walker   Distance 150'x1   Stair Management Assistance 5  Supervision   Additional items Verbal cues  (for hand placement and sequencing)   Stair Management Technique One rail L;Step to pattern   Number of Stairs 3   Balance   Static Sitting Good   Dynamic Sitting Fair +   Static Standing Fair   Dynamic Standing Fair -   Ambulatory Fair -   Endurance Deficit   Endurance Deficit Yes   Endurance Deficit Description postural and gait degradation noted with fatigue   Activity Tolerance   Activity Tolerance Patient limited by fatigue   Medical Staff Made Aware Spoke to Nori Henry OT   Nurse Made Aware Spoke to RN   Assessment   Prognosis Good   Problem List Decreased strength;Decreased range of motion;Decreased endurance; Impaired balance;Decreased coordination;Decreased safety awareness;Decreased mobility   Assessment Pt  is a 48 yo F who presents s/p Transnasal pituitary Mass resection  order placed for PT eval and tx, w/ activity order of up w/ A  pt presents w/ comorbidities of Htn, Compression of Optic Chiasm, Pituitary macroadenoma, Class 1 obesity, in adult chest pain and dizzines and personal factors of living in 2 story house, mobilizing w/ assistive device, stair(s) to enter home, inability to navigate community distances, unable to perform dynamic tasks in community, limited home support, unable to perform physical activity, inability to perform IADLs, inability to perform ADLs and inability to live alone  pt presents w/ weakness, decreased endurance, impaired balance, gait deviations, impaired coordination, decreased safety awareness, impaired judgment, fall risk and LE edema  these impairments are evident in findings from physical examination (weakness and impaired coordination), mobility assessment (need for Supervision assist w/ all phases of mobility when usually mobilizing independently, tolerance to only 150 feet of ambulation and need for cueing for mobility technique), and Barthel Index: 80/100  pt needed input for task focus and mobility technique  pt is at risk for falls due to physical and safety awareness deficits  pt's clinical presentation is unstable/unpredictable (evident in need for assist w/ all phases of mobility when usually mobilizing independently, tolerance to only 150 feet of ambulation, pain impacting overall mobility status, need for input for mobility technique, need for input for task focus and mobility technique and need for input for mobility technique/safety)  pt needs inpatient PT tx to improve mobility deficits  discharge recommendation is for home w/ family support to reduce fall risk and maximize level of functional independence  Goals   Patient Goals to get home   STG Expiration Date 11/18/19   Short Term Goal #1 pt will:  Increase bilateral LE strength 1/2 grade to facilitate independent mobility, Perform all bed mobility tasks modified independent to decrease fall risk factors, Perform all transfers modified independent to improve independence, Ambulate 350 ft  without assistive device modified independent w/o LOB, Navigate 10 stairs w/ supervision with unilateral handrail to facilitate return to previous living environment and Improve Barthel Index score to 100 or greater to facilitate independence   PT Treatment Day 0   Plan   Treatment/Interventions LE strengthening/ROM; Functional transfer training;Elevations; Therapeutic exercise; Endurance training;Equipment eval/education; Bed mobility;Gait training;Spoke to nursing;OT   PT Frequency   (3-5x/week)   Recommendation   Recommendation Home with family support  (pending progression)   PT - OK to Discharge Yes   Additional Comments if patient is progressing towards goals   Barthel Index   Feeding 10   Bathing 5   Grooming Score 5   Dressing Score 5   Bladder Score 10   Bowels Score 10   Toilet Use Score 10   Transfers (Bed/Chair) Score 10   Mobility (Level Surface) Score 10   Stairs Score 5   Barthel Index Score 80     Skilled PT recommended while in hospital and upon DC to progress pt toward treatment goals       Sridhar Urbano, PT, DPT 11/8/2019

## 2019-11-08 NOTE — PLAN OF CARE
Problem: OCCUPATIONAL THERAPY ADULT  Goal: Performs self-care activities at highest level of function for planned discharge setting  See evaluation for individualized goals  Description  Treatment Interventions: ADL retraining, Visual perceptual retraining, Functional transfer training, UE strengthening/ROM, Endurance training, Cognitive reorientation, Patient/family training, Equipment evaluation/education, Compensatory technique education, Continued evaluation, Energy conservation, Activityengagement          See flowsheet documentation for full assessment, interventions and recommendations  Note:   Limitation: Decreased ADL status, Decreased endurance, Decreased self-care trans, Decreased high-level ADLs  Prognosis: Good  Assessment: Pt is a 48 y/o female admitted to [facility] w/ pituitary edema  Pt has longstanding history of pituitary adenoma diagnosed and treated in 2004 with surgery  Pt followed with scans overtime with noted enlargement  Pt underwent "endoscopic transnasal transsphenoidal resection of pituitary mass, abdominal fat graft, possible lumbar drain" and placement of nasal packing dowell balloon on 11/07/19  Pt's PMH includes: hypercholesteremia, HTN, pituitary tumor  Pt seen for OT evaluation on 11/08/19 with active OOB orders  Pt lives with brother in bilevel home with 5 [address]TE with bilateral railings and 5 steps up/down inside  At baseline, pt was I with ADLs, IADLs, and functional mobility/transfers with no report of DME  Pt driving PTA  Currently, pt requires supervision for UB ADLs, Min A x 1 for LB ADLs and functional transfers/mobility  Pt presents with the following impairments: decreased sitting/standing tolerance, decreased sitting/standing balance, decreased activity tolerance, decreased endurance, lethargy, and fatigue   These deficits limit her ability to perform dressing, bathing, toileting, functional mobility/transfers, community mobility, driving, work-related tasks, and leisure pursuits  Based on the above mentioned deficits and ongoing limitations, would consider pt to be moderate complexity evaluation  Pt scored 70/100 on the Barthel Index  Upon d/c, OT recommends home with family support,once medically stable  Pt will continue to be seen for skilled OT 3-5x/wk during LOS to achieve listed goals       OT Discharge Recommendation: Home with family support  OT - OK to Discharge: Yes(once medically stable)    Reese Valdez, OTS

## 2019-11-08 NOTE — PLAN OF CARE
Problem: PHYSICAL THERAPY ADULT  Goal: Performs mobility at highest level of function for planned discharge setting  See evaluation for individualized goals  Description  Treatment/Interventions: LE strengthening/ROM, Functional transfer training, Elevations, Therapeutic exercise, Endurance training, Equipment eval/education, Bed mobility, Gait training, Spoke to nursing, OT          See flowsheet documentation for full assessment, interventions and recommendations  Outcome: Progressing  Note:   Prognosis: Good  Problem List: Decreased strength, Decreased range of motion, Decreased endurance, Impaired balance, Decreased coordination, Decreased safety awareness, Decreased mobility  Assessment: Pt  is a 48 yo F who presents s/p Transnasal pituitary Mass resection  order placed for PT eval and tx, w/ activity order of up w/ A  pt presents w/ comorbidities of Htn, Compression of Optic Chiasm, Pituitary macroadenoma, Class 1 obesity, in adult chest pain and dizzines and personal factors of living in 2 story house, mobilizing w/ assistive device, stair(s) to enter home, inability to navigate community distances, unable to perform dynamic tasks in community, limited home support, unable to perform physical activity, inability to perform IADLs, inability to perform ADLs and inability to live alone  pt presents w/ weakness, decreased endurance, impaired balance, gait deviations, impaired coordination, decreased safety awareness, impaired judgment, fall risk and LE edema  these impairments are evident in findings from physical examination (weakness and impaired coordination), mobility assessment (need for Supervision assist w/ all phases of mobility when usually mobilizing independently, tolerance to only 150 feet of ambulation and need for cueing for mobility technique), and Barthel Index: 80/100  pt needed input for task focus and mobility technique   pt is at risk for falls due to physical and safety awareness deficits  pt's clinical presentation is unstable/unpredictable (evident in need for assist w/ all phases of mobility when usually mobilizing independently, tolerance to only 150 feet of ambulation, pain impacting overall mobility status, need for input for mobility technique, need for input for task focus and mobility technique and need for input for mobility technique/safety)  pt needs inpatient PT tx to improve mobility deficits  discharge recommendation is for home w/ family support to reduce fall risk and maximize level of functional independence  Recommendation: Home with family support(pending progression)     PT - OK to Discharge: Yes    See flowsheet documentation for full assessment

## 2019-11-08 NOTE — PROGRESS NOTES
Pt taken via bed while cardiac and O2 Sat monitored for routine F/U Cat Scan of the head  Tolerated without incident  BMP and urine Spec grav sent to lab stat on return as directed from the standing orders because of increased urine output  Dr Rocío Diaz made aware

## 2019-11-08 NOTE — PLAN OF CARE
Problem: Potential for Falls  Goal: Patient will remain free of falls  Description  INTERVENTIONS:  - Assess patient frequently for physical needs  -  Identify cognitive and physical deficits and behaviors that affect risk of falls  -  [place] fall precautions as indicated by assessment   - Educate patient/family on patient safety including physical limitations  - Instruct patient to call for assistance with activity based on assessment  - Modify environment to reduce risk of injury  - Consider OT/PT consult to assist with strengthening/mobility  Outcome: Progressing     Problem: NEUROSENSORY - ADULT  Goal: Achieves stable or improved neurological status  Description  INTERVENTIONS  - Monitor and report changes in neurological status  - Monitor vital signs such as temperature, blood pressure, glucose, and any other labs ordered   - Initiate measures to prevent increased intracranial pressure  - Monitor for seizure activity and implement precautions if appropriate      Outcome: Progressing  Goal: Remains free of injury related to seizures activity  Description  INTERVENTIONS  - Maintain airway, patient safety  and administer oxygen as ordered  - Monitor patient for seizure activity, document and report duration and description of seizure to physician/advanced practitioner  - If seizure occurs,  ensure patient safety during seizure  - Reorient patient post seizure  - Seizure pads on all 4 side rails  - Instruct patient/family to notify RN of any seizure activity including if an aura is experienced  - Instruct patient/family to call for assistance with activity based on nursing assessment  - Administer anti-seizure medications if ordered    Outcome: Progressing  Goal: Achieves maximal functionality and self care  Description  INTERVENTIONS  - Monitor swallowing and airway patency with patient fatigue and changes in neurological status  - Encourage and assist patient to increase activity and self care     - Encourage visually impaired, hearing impaired and aphasic patients to use assistive/communication devices  Outcome: Progressing     Problem: CARDIOVASCULAR - ADULT  Goal: Maintains optimal cardiac output and hemodynamic stability  Description  INTERVENTIONS:  - Monitor I/O, vital signs and rhythm  - Monitor for S/S and trends of decreased cardiac output  - Administer and titrate ordered vasoactive medications to optimize hemodynamic stability  - Assess quality of pulses, skin color and temperature  - Assess for signs of decreased coronary artery perfusion  - Instruct patient to report change in severity of symptoms  Outcome: Progressing  Goal: Absence of cardiac dysrhythmias or at baseline rhythm  Description  INTERVENTIONS:  - Continuous cardiac monitoring, vital signs, obtain 12 lead EKG if ordered  - Administer antiarrhythmic and heart rate control medications as ordered  - Monitor electrolytes and administer replacement therapy as ordered  Outcome: Progressing     Problem: RESPIRATORY - ADULT  Goal: Achieves optimal ventilation and oxygenation  Description  INTERVENTIONS:  - Assess for changes in respiratory status  - Assess for changes in mentation and behavior  - Position to facilitate oxygenation and minimize respiratory effort  - Oxygen administered by appropriate delivery if ordered  - Initiate smoking cessation education as indicated  - Encourage broncho-pulmonary hygiene including cough, deep breathe, Incentive Spirometry  - Assess the need for suctioning and aspirate as needed  - Assess and instruct to report SOB or any respiratory difficulty  - Respiratory Therapy support as indicated  Outcome: Progressing     Problem: GASTROINTESTINAL - ADULT  Goal: Maintains or returns to baseline bowel function  Description  INTERVENTIONS:  - Assess bowel function  - Encourage oral fluids to ensure adequate hydration  - Administer IV fluids if ordered to ensure adequate hydration  - Administer ordered medications as needed  - Encourage mobilization and activity  - Consider nutritional services referral to assist patient with adequate nutrition and appropriate food choices  Outcome: Progressing     Problem: GENITOURINARY - ADULT  Goal: Maintains or returns to baseline urinary function  Description  INTERVENTIONS:  - Assess urinary function  - Encourage oral fluids to ensure adequate hydration if ordered  - Administer IV fluids as ordered to ensure adequate hydration  - Administer ordered medications as needed  - Offer frequent toileting  - Follow urinary retention protocol if ordered  Outcome: Progressing     Problem: METABOLIC, FLUID AND ELECTROLYTES - ADULT  Goal: Electrolytes maintained within normal limits  Description  INTERVENTIONS:  - Monitor labs and assess patient for signs and symptoms of electrolyte imbalances  - Administer electrolyte replacement as ordered  - Monitor response to electrolyte replacements, including repeat lab results as appropriate  - Instruct patient on fluid and nutrition as appropriate  Outcome: Progressing  Goal: Fluid balance maintained  Description  INTERVENTIONS:  - Monitor labs   - Monitor I/O and WT  - Instruct patient on fluid and nutrition as appropriate  - Assess for signs & symptoms of volume excess or deficit  Outcome: Progressing  Goal: Glucose maintained within target range  Description  INTERVENTIONS:  - Monitor Blood Glucose as ordered  - Assess for signs and symptoms of hyperglycemia and hypoglycemia  - Administer ordered medications to maintain glucose within target range  - Assess nutritional intake and initiate nutrition service referral as needed  Outcome: Progressing     Problem: SKIN/TISSUE INTEGRITY - ADULT  Goal: Skin integrity remains intact  Description  INTERVENTIONS  - Identify patients at risk for skin breakdown  - Assess and monitor skin integrity  - Assess and monitor nutrition and hydration status  - Monitor labs (i e  albumin)  - Assess for incontinence   - Turn and reposition patient  - Assist with mobility/ambulation  - Relieve pressure over bony prominences  - Avoid friction and shearing  - Provide appropriate hygiene as needed including keeping skin clean and dry  - Evaluate need for skin moisturizer/barrier cream  - Collaborate with interdisciplinary team (i e  Nutrition, Rehabilitation, etc )   - Patient/family teaching  Outcome: Progressing  Goal: Incision(s), wounds(s) or drain site(s) healing without S/S of infection  Description  INTERVENTIONS  - Assess and document risk factors for skin impairment   - Assess and document dressing, incision, wound bed, drain sites and surrounding tissue  - Consider nutrition services referral as needed  - Oral mucous membranes remain intact  - Provide patient/ family education  Outcome: Progressing  Goal: Oral mucous membranes remain intact  Description  INTERVENTIONS  - Assess oral mucosa and hygiene practices  - Implement preventative oral hygiene regimen  - Implement oral medicated treatments as ordered  - Initiate Nutrition services referral as needed  Outcome: Progressing     Problem: HEMATOLOGIC - ADULT  Goal: Maintains hematologic stability  Description  INTERVENTIONS  - Assess for signs and symptoms of bleeding or hemorrhage  - Monitor labs  - Administer supportive blood products/factors as ordered and appropriate  Outcome: Progressing     Problem: MUSCULOSKELETAL - ADULT  Goal: Maintain or return mobility to safest level of function  Description  INTERVENTIONS:  - Assess patient's ability to carry out ADLs; assess patient's baseline for ADL function and identify physical deficits which impact ability to perform ADLs (bathing, care of mouth/teeth, toileting, grooming, dressing, etc )  - Assess/evaluate cause of self-care deficits   - Assess range of motion  - Assess patient's mobility  - Assess patient's need for assistive devices and provide as appropriate  - Encourage maximum independence but intervene and supervise when necessary  - Involve family in performance of ADLs  - Assess for home care needs following discharge   - Consider OT consult to assist with ADL evaluation and planning for discharge  - Provide patient education as appropriate  Outcome: Progressing  Goal: Maintain proper alignment of affected body part  Description  INTERVENTIONS:  - Support, maintain and protect limb and body alignment  - Provide patient/ family with appropriate education  Outcome: Progressing     Problem: PAIN - ADULT  Goal: Verbalizes/displays adequate comfort level or baseline comfort level  Description  Interventions:  - Encourage patient to monitor pain and request assistance  - Assess pain using appropriate pain scale  - Administer analgesics based on type and severity of pain and evaluate response  - Implement non-pharmacological measures as appropriate and evaluate response  - Consider cultural and social influences on pain and pain management  - Notify physician/advanced practitioner if interventions unsuccessful or patient reports new pain  Outcome: Progressing     Problem: INFECTION - ADULT  Goal: Absence or prevention of progression during hospitalization  Description  INTERVENTIONS:  - Assess and monitor for signs and symptoms of infection  - Monitor lab/diagnostic results  - Monitor all insertion sites, i e  indwelling lines, tubes, and drains  - Monitor endotracheal if appropriate and nasal secretions for changes in amount and color  - [place] appropriate cooling/warming therapies per order  - Administer medications as ordered  - Instruct and encourage patient and family to use good hand hygiene technique  - Identify and instruct in appropriate isolation precautions for identified infection/condition  Outcome: Progressing  Goal: Absence of fever/infection during neutropenic period  Description  INTERVENTIONS:  - Monitor WBC    Outcome: Progressing     Problem: SAFETY ADULT  Goal: Patient will remain free of falls  Description  INTERVENTIONS:  - Assess patient frequently for physical needs  -  Identify cognitive and physical deficits and behaviors that affect risk of falls    -  [place] fall precautions as indicated by assessment   - Educate patient/family on patient safety including physical limitations  - Instruct patient to call for assistance with activity based on assessment  - Modify environment to reduce risk of injury  - Consider OT/PT consult to assist with strengthening/mobility  Outcome: Progressing  Goal: Maintain or return to baseline ADL function  Description  INTERVENTIONS:  -  Assess patient's ability to carry out ADLs; assess patient's baseline for ADL function and identify physical deficits which impact ability to perform ADLs (bathing, care of mouth/teeth, toileting, grooming, dressing, etc )  - Assess/evaluate cause of self-care deficits   - Assess range of motion  - Assess patient's mobility; develop plan if impaired  - Assess patient's need for assistive devices and provide as appropriate  - Encourage maximum independence but intervene and supervise when necessary  - Involve family in performance of ADLs  - Assess for home care needs following discharge   - Consider OT consult to assist with ADL evaluation and planning for discharge  - Provide patient education as appropriate  Outcome: Progressing  Goal: Maintain or return mobility status to optimal level  Description  INTERVENTIONS:  - Assess patient's baseline mobility status (ambulation, transfers, stairs, etc )    - Identify cognitive and physical deficits and behaviors that affect mobility  - Identify mobility aids required to assist with transfers and/or ambulation (gait belt, sit-to-stand, lift, walker, cane, etc )  - [place] fall precautions as indicated by assessment  - Record patient progress and toleration of activity level on Mobility SBAR; progress patient to next Phase/Stage  - Instruct patient to call for assistance with activity based on assessment  - Consider rehabilitation consult to assist with strengthening/weightbearing, etc   Outcome: Progressing     Problem: DISCHARGE PLANNING  Goal: Discharge to home or other facility with appropriate resources  Description  INTERVENTIONS:  - Identify barriers to discharge w/patient and caregiver  - Arrange for needed discharge resources and transportation as appropriate  - Identify discharge learning needs (meds, wound care, etc )  - Arrange for interpretive services to assist at discharge as needed  - Refer to Case Management Department for coordinating discharge planning if the patient needs post-hospital services based on physician/advanced practitioner order or complex needs related to functional status, cognitive ability, or social support system  Outcome: Progressing     Problem: Knowledge Deficit  Goal: Patient/family/caregiver demonstrates understanding of disease process, treatment plan, medications, and discharge instructions  Description  Complete learning assessment and assess knowledge base    Interventions:  - Provide teaching at level of understanding  - Provide teaching via preferred learning methods  Outcome: Progressing

## 2019-11-08 NOTE — OCCUPATIONAL THERAPY NOTE
[address]  Evaluation     Patient Name: Radha GAR Date: 2019  Problem List  Principal Problem:    Status post transsphenoidal pituitary resection [facility])  Active Problems:    Hypertension    Compression of optic chiasm    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HCC)    Class 1 obesity in adult    Past Medical History  Past Medical History:   Diagnosis Date    GERD (gastroesophageal reflux disease)     Hypercholesteremia     Hypertension     Pituitary tumor      Past Surgical History  Past Surgical History:   Procedure Laterality Date     SECTION      NE NEUROENDOSCOP,EXC,PIT ESPERANZA,TRANSNAS/SPHEN N/A 2019    Procedure: Image guided endoscopic transnasal transsphenoidal resection of pituitary mass, abdominal fat graft, possible lumbar drain;  Surgeon: Camryn Marti MD;  Location: [facility];  Service: Neurosurgery    TRANSPHENOIDAL / TRANSNASAL HYPOPHYSECTOMY / RESECTION PITUITARY TUMOR               19 1044   Note Type   Note type Eval/Treat   Restrictions/Precautions   Weight Bearing Precautions Per Order No   Other Precautions Bed Alarm;Multiple lines;Telemetry; Fall Risk;Visual impairment; Seizure  (sinus)   Pain Assessment   Pain Assessment No/denies pain   Pain Score No Pain   Home Living   Type of Home House  (bilevel)   Home Layout Two level;Stairs to enter with rails  (5STE w/ bilateral rails, 5steps up/down inside)   Bathroom Shower/Tub Tub/shower unit   Bathroom Toilet Standard   Bathroom Equipment   (denies any)   Home Equipment   (denies any)   Additional Comments Lives w/ brother in bilevel home w/ 5STE w/ bilateral rails and 5 steps up/down inside  Pt has tub/shower and standard toilet  Denies any gb or DME  Pt's bed/br located on bottom floor     Prior Function   Level of [place] Independent with ADLs and functional mobility   Lives With Family  (brother)   ADL Assistance Independent   IADLs Independent   Falls in the last 6 months 0   Vocational Full time employment   Comments Pt reports I with ADLs/IADLs PTA  Pt works full-time  Reports her brother works full-time but has friends/family nearby to assist/check in   [address] [facility] PTA pt reports I with ADLs, IADLs, functional mobility/transfers, and was driving   Reciprocal Relationships lives w/ brother   Service to Others works full-time   Intrinsic Gratification enjoys partying and hosting friends/family   Psychosocial   Psychosocial (WDL) WDL   Length of Time/Family Visitation Other (Comment)  (no family/friends present)   Subjective   Subjective "I just want to rest"   ADL   Eating Assistance 7  Independent   Grooming Assistance 7  Independent   UB Bathing Assistance 5  [address] [place] [address] Dr 4  Minimal Assistance    [facility] [address] 5  Supervision/Setup    [facility] [address] 4  Minimal Assistance   LB Dressing Deficit Verbal cueing;Steadying;Supervision/safety; Increased time to complete; Don/doff R sock; Don/doff L sock   Toileting Assistance  4  Minimal Assistance   Functional Assistance 4  Minimal Assistance   Functional Deficit Steadying;Verbal cueing;Supervision/safety; Increased time to complete  (x1, HHA)   Bed Mobility   Supine to Sit Unable to assess   Sit to Supine Unable to assess   Additional Comments Pt received up OOB in chair and placed back in chair at end of session  Transfers   Sit to Stand 5  Supervision   Additional items Increased time required;Verbal cues;Armrests   Stand to Sit 5  Supervision   Additional items Armrests; Increased time required;Verbal cues   Additional Comments Performed sit to stand from chair and stand to sit back into chair  Required supervision and VC for safety  Functional Mobility   Functional Mobility 4  Minimal assistance   Additional Comments Required Min A x 1 and VC for safety   No losses of balance observed   Balance   Static Sitting Fair +   Dynamic Sitting Fair +   Static Standing [facility] -   Activity Tolerance   Activity Tolerance Patient limited by fatigue   Medical Staff Made Aware PT Ronnie Morrow   Nurse Made Aware yes, RN Lisandro Call   RUNITZA Assessment   RUE Assessment WFL   LUE Assessment   LUE Assessment WFL   Hand Function   Gross Motor Coordination Functional   Fine Motor Coordination Functional   Sensation   Light Touch No apparent deficits   Vision-Basic Assessment   Current Vision Wears glasses only for reading   Patient Visual Report Other (Comment)  (reports vision is good and no visual changes)   Cognition   Overall Cognitive Status WFL   Arousal/Participation Responsive; Cooperative;Lethargic   Attention Within functional limits   Orientation Level Oriented X4   Memory Within functional limits   Following Commands Follows multistep commands without difficulty   Comments Pt is cooperative but very lethargic with continual c/o needing to rest  Able to recall all home set up/biographical info  Able to attend to therapy session without redirection   Assessment   Limitation Decreased ADL status; Decreased endurance;Decreased self-care trans;Decreased high-level ADLs   Prognosis Good   Assessment Pt is a 48 y/o female admitted to [facility] w/ pituitary edema  Pt has longstanding history of pituitary adenoma diagnosed and treated in 2004 with surgery  Pt followed with scans overtime with noted enlargement  Pt underwent "endoscopic transnasal transsphenoidal resection of pituitary mass, abdominal fat graft, possible lumbar drain" and placement of nasal packing dowell balloon on 11/07/19  Pt's PMH includes: hypercholesteremia, HTN, pituitary tumor  Pt seen for OT evaluation on 11/08/19 with active OOB orders  Pt lives with brother in bilevel home with 5 GEORGETTE with bilateral railings and 5 steps up/down inside  At baseline, pt was I with ADLs, IADLs, and functional mobility/transfers with no report of DME  Pt driving PTA   Currently, pt requires supervision for UB ADLs, Min A x 1 for LB ADLs and functional transfers/mobility  Pt presents with the following impairments: decreased sitting/standing tolerance, decreased sitting/standing balance, decreased activity tolerance, decreased endurance, lethargy, and fatigue  These deficits limit her ability to perform dressing, bathing, toileting, functional mobility/transfers, community mobility, driving, work-related tasks, and leisure pursuits  Based on the above mentioned deficits and ongoing limitations, would consider pt to be moderate complexity evaluation  Pt scored 70/100 on the [place] Index  Upon d/c, OT recommends home with family support,once medically stable  Pt will continue to be seen for skilled OT 3-5x/wk during LOS to achieve listed goals  Goals   Patient Goals to get some rest and go home   LTG Time Frame 7-10   Long Term Goal #1 see below listed goals   Plan   Treatment Interventions ADL retraining;Visual perceptual retraining;Functional transfer training;UE strengthening/ROM; Endurance training;Cognitive reorientation;Patient/family training;Equipment evaluation/education; Compensatory technique education;Continued evaluation; Energy conservation; Activityengagement   Goal Expiration Date 11/18/19   OT Frequency 3-5x/wk   Recommendation   OT Discharge Recommendation Home with family support   OT - OK to Discharge Yes  (once medically stable)   B[place] Index   Feeding 10   Bathing 0   Grooming Score 5   Dressing Score 5   Bladder Score 10   Bowels Score 10   Toilet Use Score 5   Transfers (Bed/Chair) Score 10   Mobility (Level Surface) Score 10   Stairs Score 5   [place] Index Score 70   Modified [place] Scale   Modified [place] Scale 4     GOALS  Pt will perform functional transfers Mod I with use of DME as needed to increase functional independence to promote participation in ADLs/IADLs  Pt will perform functional mobility Mod I with use of AE/DME as needed to increase functional independence for engagement in meaningful activities    Pt will complete all UB ADLs Mod I with use of AE/DME as needed to increase independence with ADL performance  Pt will complete all LB ADLs Mod I with use of AE/DME as needed to increase independence with ADL performance  Pt will complete toileting Mod I with use of AE/DME as needed to increase functional independence  Pt will be AOx4 100% of the time to improve overall engagement in therapeutic activities  Pt will stand for approximately 10 min to complete ADL task Mod I with use of AE/DME as needed while demonstrating G balance and safety to improve dynamic standing balance in preparation for sink-level ADLs  Pt will tolerate 25-30 min skilled OT session with no rest breaks to improve overall activity tolerance for increased participation in functional activities        Georgette Wilde, OTS

## 2019-11-08 NOTE — PROGRESS NOTES
Progress Note - Victor M Leon 1965, 49 y o  female MRN: [id number]    Unit/Bed#: ICU 04 Encounter: [id number]    Primary Care Provider: Domingo David DO   Date and time admitted to hospital: 11/7/2019  6:21 AM        * Status post transsphenoidal pituitary resection [facility])  Assessment & Plan  Neuro:    POD 1 s/p Image guided endoscopic transnasal transsphenoidal resection of pituitary mass, abdominal fat graft  Had intraop leak that was sealed  No Lumbar drain in place  · Continue regular neurologic checks  · Imaging reviewed personally and by attending  Final results as below  · CT head wo 11/8/19: Expected post op changes s/p pituitary mass resection, no significant/large hemorrhage noted post op  · Pain control per primary team      ENT: No use of straws, no sneezing or blowing the nose- discussed with pt  Pt has dowell tube in nose that should remain in place x 2 days per ENT  Monitor drainage from nose: please notify neurosurgery if pt draining a lot from nose  Pulm: no incentive spirometry use  :  Strict monitoring of Inputs and outputs  Pt had dowell removed today  Heme:   WBC 11/8: slightly elevated at 15 04, likely reactive  Hgb wnl at 12 2  BMP 11/8: Na elevated at 148, continue to monitor  K wnl at 4 0  Continue to monitor  Endo:   · Continue to monitor DI  · Net I/O + 715/ last 24 hrs  · 11/8/19 Na elevated at 148  · 11/8/19 Urine specific gravity wnl at 1 005  · Urine Osmolality pending  FEN:  · Fluids: Pt tolerating oral fluid intake  · Nutrition: Pt tolerating oral intake  MSK/SKIN:   · Eval and mobilize per PT/OT  · DVT PPX: SCDs  Will await final read on [address] prior to initiation of DVT ppx  Rounding completed with Ba Ortega at bedside     Neurosurgery will continue to follow as primary  Please call with any questions or concerns          Subjective/Objective      Chief Complaint: "I am doing good"    Subjective: Pt reports she is doing well this morning though reports some discomfort from having the dowell tube in her nose/some slight drainage from the nose  Pt denies having HA, dizziness, lightheadedness, nausea, vomiting, double vision, blurry vision or visual disturbance  Pt denies any numbness, tingling or weakness in bilateral arms or legs  Pt just had the bladder dowell catheter removed this AM and has not voided independently yet  Pt denies fever, chills, chest pain, SOB or abdominal pain  Objective: Alert and awake, laying in bed, NAD  I/O       11/06 0701 - 11/07 0700 11/07 0701 - 11/08 0700 11/08 0701 - 11/09 0700    P  O   1050     I V  (mL/kg)  3090 (45 1)     IV Piggyback  50     Total Intake(mL/kg)  4190 (61 2)     Urine (mL/kg/hr)  3425 (2 1) 450 (3 6)    Blood  50     Total Output  3475 450    Net  +715 -450                 Invasive Devices     Peripheral Intravenous Line            Peripheral IV 11/07/19 Left Antecubital 1 day    Peripheral IV 11/07/19 Right Hand less than 1 day          Arterial Line            Arterial Line 11/07/19 Right Radial less than 1 day          Drain            Urethral Catheter Double-lumen;Non-latex 18 Fr  less than 1 day                Physical Exam:  Vitals: Blood pressure 119/57, pulse 68, temperature 97 6 °F (36 4 °C), temperature source Oral, resp  rate 16, height 5' 3" (1 6 m), weight 68 5 kg (151 lb 0 2 oz), SpO2 96 %  ,Body mass index is 26 75 kg/m²  General appearance: alert, appears stated age, cooperative and no distress  Head: Normocephalic, without obvious abnormality  Eyes: EOMI, PERRL  Neck: supple, symmetrical, trachea midline   ENT: Dowell tube in nose, slight bloody+ mild clear drainage noted on gauze in the nose  Lungs: non labored breathing  Heart: regular heart rate  Neurologic:   Mental status: Alert, oriented x 3, thought content appropriate  Cranial nerves: grossly intact (Cranial nerves II-XII)  Sensory: normal to LT X 4  Good proprioception, DST intact     Motor: moving all extremities without focal weakness, strength 5/5 throughout  Reflexes: 2+ and symmetric BUE/BLE  Coordination: finger to nose normal bilaterally, no drift bilaterally      Lab Results:  Results from last 7 days   Lab Units 11/08/19  0433   WBC Thousand/uL 15 04*   HEMOGLOBIN g/dL 12 2   HEMATOCRIT % 38 7   PLATELETS Thousands/uL 234     Results from last 7 days   Lab Units 11/08/19  0433   POTASSIUM mmol/L 4 0   CHLORIDE mmol/L 117*   CO2 mmol/L 25   BUN mg/dL 12   CREATININE mg/dL 0 98   CALCIUM mg/dL 8 5     Results from last 7 days   Lab Units 11/08/19  0433   MAGNESIUM mg/dL 2 4     Results from last 7 days   Lab Units 11/08/19  0433   PHOSPHORUS mg/dL 2 5*         Imaging Studies: I have personally reviewed pertinent reports and I have personally reviewed pertinent films in PACS    Ct Head Wo Contrast    Result Date: 11/8/2019  Impression: Status post interval transsphenoidal resection of a previously identified pituitary adenoma with postsurgical changes  No large postoperative intracranial hematoma identified  Workstation performed: [id number]       EKG, Pathology, and Other Studies: I have personally reviewed pertinent reports  VTE Pharmacologic Prophylaxis: Reason for no pharmacologic prophylaxis will consider when to start pharm dvt ppx  VTE Mechanical Prophylaxis: sequential compression device    PLEASE NOTE:  This encounter may have been completed utilizing the [employer] Direct Speech Voice Recognition Software  Grammatical errors, random word insertions, pronoun errors and incomplete sentences are occasional consequences of the system due to software limitations, ambient noise and hardware issues  These may be missed by proof reading prior to affixing electronic signature  Any questions or concerns about the content, text or information contained within the body of this dictation should be directly addressed to the advanced practitioner or physician for clarification   Please do not hesitate to call me directly if you have any questions or concerns

## 2019-11-08 NOTE — UTILIZATION REVIEW
Initial Clinical Review    Elective inpatient surgical procedure  Age/Sex: 49 y o  female  Surgery Date: 11/7/19  Procedure: Image guided endoscopic transnasal transsphenoidal resection of pituitary mass, abdominal fat graft, possible lumbar drain  IMAGE GUIDED ENDOSCOPIC TRANSNASAL EXPOSURE FOR TRANSSPHENOIDAL SURGERY  Revision transnasal transsphenoidal resection of pituitary mass with abdominal fat graft and nasoseptal flap using image guidance  Anesthesia: general  Operative Findings: Large extensive fibrous mass c/w pitutary adenoma     Admission Orders: Date/Time/Statement: Inpatient Admission Orders (From admission, onward)     Ordered        11/07/19 1610  Inpatient Admission  Once                   Orders Placed This Encounter   Procedures    Inpatient Admission     Standing Status:   Standing     Number of Occurrences:   1     Order Specific Question:   Admitting Physician     Answer:   Lee Ridley [[id number]]     Order Specific Question:   Level of Care     Answer:   Critical Care [15]     Order Specific Question:   Estimated length of stay     Answer:   Inpatient Only Surgery     Vital Signs: /57   Pulse 68   Temp 97 6 °F (36 4 °C) (Oral)   Resp 16   Ht 5' 3" (1 6 m)   Wt 68 5 kg (151 lb 0 2 oz) Comment: weight was done twice  SpO2 96%   BMI 26 75 kg/m²   Admission orders:  icu  Seizure precautions  Elevate hob  Neuro checks hourly  A-line care & monitoring  Wound care:1) Check operative dressing with vital signs  2) Change dressing on POD # 2 and PRN when saturated  3) Replace steri-strips PRN    Sesay cath  Pt/ot eval & tx  Consult nutrition  Diet: regular  Mobility: oob  DVT Prophylaxis: venodynes    Medications/Pain Control:   acetaminophen 975 mg Oral Q8H KEVIN   atorvastatin 20 mg Oral Daily   docusate sodium 100 mg Oral BID   hydrochlorothiazide 12 5 mg Oral Daily   metoprolol succinate 100 mg Oral Daily   potassium-sodium phosphates 2 packet Oral Once   senna 2 tablet Oral HS   PRN Meds:  bisacodyl 10 mg Rectal Daily PRN   hydrALAZINE 10 mg Intravenous Q4H PRN   HYDROmorphone 0 5 mg Intravenous Q1H PRN   Labetalol HCl 10 mg Intravenous Q4H PRN   meclizine 25 mg Oral TID PRN   naloxone 0 04 mg Intravenous Q1MIN PRN   ondansetron 4 mg Intravenous Q6H PRN   oxyCODONE 10 mg Oral Q4H PRN   oxyCODONE 5 mg Oral Q4H PRN   phenol 1 spray Mouth/Throat Q2H PRN     Network Utilization Review Department  [email or web address]  ATTENTION: Please call with any questions or concerns to [phone] and carefully listen to the prompts so that you are directed to the right person  All voicemails are confidential   Florinda Clements all requests for admission clinical reviews, approved or denied determinations and any other requests to dedicated fax number below belonging to the campus where the patient is receiving treatment    FACILITY NAME UR FAX NUMBER   ADMISSION DENIALS (Administrative/Medical Necessity) [address] [facility] (Maternity/NICU/Pediatrics) [phone]   [facility] [address] [facility] [phone]   [facility] [address] [facility] [phone]   [place]le[facility] Socks [address] [phone]

## 2019-11-08 NOTE — PROGRESS NOTES
Dr Hannah Parker made aware that the pt did not take her antihypertensive medication at home and the MAP > 110 despite hydralazine and labetalol PRN doses already given   Please see orders

## 2019-11-08 NOTE — SOCIAL WORK
CM met pt at bedside, introduce self and made aware of CM role at dc  Pt denies having a LW and POA  Primary contact is her cousin Louis Marshall- [phone]  Pt reported that she lives with her family in a bilevel house with 5 [address] and 5 steps to the main flr  Pt was IPTA with all ADl's, drive and employed  Pt has no DME  PCP is Dr Jennifer Boles  Pharmacy is [facility]  Pt denies hx with HHc, STR, alc, drug and IP psych tx  Pt reported that her cousin Natalio Kimball will provide transportation when dc  CM reviewed d/c planning process including the following: identifying help at home, patient preference for d/c planning needs, Discharge Lounge, [facility] Meds to Bed program, availability of treatment team to discuss questions or concerns patient and/or family may have regarding understanding medications and recognizing signs and symptoms once discharged  CM also encouraged patient to follow up with all recommended appointments after discharge  Patient advised of importance for patient and family to participate in managing patients medical well being

## 2019-11-08 NOTE — PROGRESS NOTES
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 now s/p endoscopic transsphenoidal resection on  with placement of nasal packing doewll balloon  24 Hour Events: tolerated procedure well  No complications or acute events overnight  No significant bleeding complications  Review of Systems   Constitutional: Negative for diaphoresis and fever  Eyes: Negative for redness and visual disturbance  Respiratory: Positive for cough  Negative for shortness of breath  Cardiovascular: Negative for chest pain  Gastrointestinal: Negative for abdominal pain  Neurological: Negative for tremors, seizures, facial asymmetry, speech difficulty, numbness and headaches      ______________________________________________________________________  Vitals:    19 0300 19 0400 19 0430 19 0500   BP: 121/68 136/67 136/67 136/65   BP Location: Right arm Right arm     Pulse: 80 68 64 68   Resp: 17 16 21 15   Temp:  97 7 °F (36 5 °C)     TempSrc:  Oral     SpO2: 96% 98% 98% 97%   Weight:       Height:         Past 12 hours:  BP arterial line range: 118/70 - 188/84    MAP range: 82 - 112    Temperature:   Temp (24hrs), Av 2 °F (36 8 °C), Min:96 8 °F (36 °C), Max:99 4 °F (37 4 °C)    Current Temperature: 97 7 °F (36 5 °C)  Weights:   IBW: 52 4 kg    Body mass index is 34 9 kg/m²    Weight (last 2 days)     Date/Time   Weight    19 0642   89 4 (197)              Physical Exam:   General: awake middle age female otherwise well appearing in no acute distress  HENT: normocephalic, no acromegalic features, mucus membranes moist, nasal dowell balloon packing noted with routine postop changes, no epistaxis, Mallampati score 3  Eyes: trace conjunctival injection, pupils are 2mm equal and reactive, EOMI  Neck: supple   Lungs: CTAB, no wheeze or rhonchi, no resp distress  Heart: RRR without murmur   Abdomen: soft, nontender nondistended  Extremities: no cyanosis, warm to touch, no sig edema, SCDs noted   Neuro: awake, alert, speech is fluent,   Cranial Nerves:   I: Smell not tested   II: Pupils equal, round, reactive to light with normal accomodation  III,IV,VI: extraocular muscles intact, no nystagmus   V: Sensation in the V1 through V3 distribution intact to light touch bilaterally  VII: Face is symmetric with no weakness noted  VIII: Normal response to auditory stimuli  IX/X: Uvula midline  Soft palate elevates symmetrically  XI: Trapezius strength 5/5 B/L  XII: Tongue midline with no atrophy or fasciculations and with appropriate movement  no pronator drift or tremor, normal sensation to light touch throughout all extremities, motor strength is 5/5 x 4  Skin: warm, dry, intact,         Intake and Outputs:  I/O       11/06 0701 - 11/07 0700 11/07 0701 - 11/08 0700    P  O   700    I V  (mL/kg)  3058 3 (34 2)    IV Piggyback  50    Total Intake(mL/kg)  3808 3 (42 6)    Urine (mL/kg/hr)  3150 (1 5)    Blood  50    Total Output  3200    Net  +608 3              UOP: 1 5 ml/kg/hr     Nutrition:        Diet Orders   (From admission, onward)             Start     Ordered    11/07/19 1713  Diet Regular; Regular House  Diet effective now     Question Answer Comment   Diet Type Regular    Regular Regular House    RD to adjust diet per protocol?  Yes        11/07/19 1712              Labs:   Results from last 7 days   Lab Units 11/08/19  0433   WBC Thousand/uL 15 04*   HEMOGLOBIN g/dL 12 2   HEMATOCRIT % 38 7   PLATELETS Thousands/uL 234     Results from last 7 days   Lab Units 11/08/19  0433   SODIUM mmol/L 148* POTASSIUM mmol/L 4 0   CHLORIDE mmol/L 117*   CO2 mmol/L 25   ANION GAP mmol/L 6   BUN mg/dL 12   CREATININE mg/dL 0 98   CALCIUM mg/dL 8 5     Results from last 7 days   Lab Units 11/08/19  0433   MAGNESIUM mg/dL 2 4   PHOSPHORUS mg/dL 2 5*      Imaging: CT head 11/8: reviewed personally, no acute process other than expected postoperative changes I have personally reviewed pertinent films in PACS  EKG: telemetry reviewed and without sig events       Allergies: No Known Allergies  Medications:   Scheduled Meds:  Current Facility-Administered Medications:  acetaminophen 975 mg Oral [facility] Gege Almonte MD    atorvastatin 20 mg Oral Daily Gege Almonte MD    bisacodyl 10 mg Rectal Daily PRN Gege Almonte MD    cefazolin 1,000 mg Intravenous Q8H Gege Almonte MD Last Rate: Stopped (11/07/19 5751)   docusate sodium 100 mg Oral BID Gege Almonte MD    hydrALAZINE 10 mg Intravenous Q6H PRN Melissa Kelley MD    hydrochlorothiazide 12 5 mg Oral Daily Gege Almonte MD    HYDROmorphone 0 5 mg Intravenous Q1H PRN Gege Almonte MD    Labetalol HCl 10 mg Intravenous Q6H PRN Melissa Kelley MD    meclizine 25 mg Oral TID PRN Gege Almonet MD    metoprolol succinate 100 mg Oral Daily Gege Almonte MD    naloxone 0 04 mg Intravenous Q1MIN PRN Gege Almonte MD    ondansetron 4 mg Intravenous Q6H PRN Gege Almonte MD    oxyCODONE 10 mg Oral Q4H PRN Gege Almonte MD    oxyCODONE 5 mg Oral Q4H PRN Gege Almonte MD    phenol 1 spray Mouth/Throat Q2H PRN Nicole Bello MD      Continuous Infusions:   PRN Meds:    bisacodyl 10 mg Daily PRN   hydrALAZINE 10 mg Q6H PRN   HYDROmorphone 0 5 mg Q1H PRN   Labetalol HCl 10 mg Q6H PRN   meclizine 25 mg TID PRN   naloxone 0 04 mg Q1MIN PRN   ondansetron 4 mg Q6H PRN   oxyCODONE 10 mg Q4H PRN   oxyCODONE 5 mg Q4H PRN   phenol 1 spray Q2H PRN     VTE Pharmacologic Prophylaxis: Pharmacologic VTE Prophylaxis contraindicated due to perioperative period   VTE Mechanical Prophylaxis: sequential compression device  Invasive lines and devices: Invasive Devices     Peripheral Intravenous Line            Peripheral IV 11/07/19 Left Antecubital less than 1 day    Peripheral IV 11/07/19 Right Hand less than 1 day          Arterial Line            Arterial Line 11/07/19 Right Radial less than 1 day          Drain            Urethral Catheter Double-lumen;Non-latex 18 Fr  less than 1 day    Urethral Catheter Latex 16 Fr  less than 1 day                   Assessment and Plan:   Principal Problem:    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[address] )  Active Problems:    Hypertension    Class 1 obesity in adult    Compression of optic chiasm  Resolved Problems:    * No resolved hospital problems  *      Neuro:   Nonfunctioning Pituitary macroadenoma s/p transphenoidal resection on 11/7:  POD #1  EBL 50ml  Nasal dowell balloon and MAP goal <110 to promote hemostasis  No CSF leak  Repeat CT head imaging 11/8 without acute pathological findings on my review  NSG primary  ENT following  Outpatient surveillance for occurrence in next 5-10 yrs   Meclizine 25mg TID prn dizziness     Analgesia:   Tylenol scheduled 975mg q 8hrs  Oxycodone 5mg q 4hrs prn moderate pain  Oxycodone 10mg q 4hrs prn severe pain   Dilaudid 0 5mg IV q 1hr prn breakthrough       CV:   Primary HTN:  Metoprolol succinate 100mg daily per home regimen  Not ideal agent for BP control however since already taking, will continue for potential perioperative benefit  Hydrochlorothiazide 12 5mg daily per home regimen   Keep MAP <110  Consider removal of right radial arterial line  Cuff pressures relatively equivalent  Labetalol 10mg IV q 4hrs prn MAP >100  Hydralazine 10mg IV q 4hrs prn MAP >100    HLD:  Cont atorvastatin 20mg daily     Pulm:   Perioperative mechanical ventilation:  Extubated postop on 25/4 without complications  Oral hygiene  Incentive spirometry q 1hr while awake  Respiratory protocol if hypoxic on RA       GI: Gastric motility:   Bowel regimen with bisacodyl 10mg suppository prn  Docusate 100mg bid daily   Senna 17 2mg qhs     Anti-emetic:  zofran 4mg IV q 6hrs prn     :   Perioperative urinary catheter:   Voiding trial and discontinuation of dowell if able with replacement with pure wick   Monitor I/O for signs of DI  Check urine Osm  F/E/N:   No IVF  Borderline Hypophosphatemia - replete with phos-naK 2 packets once  Regular diet  ID:   Leukocytosis:  WBC 15  Likely reactive postoperatively  No other signs or symptoms of active infection  Heme:   DVT ppx:  Hold in immediate postop period  May initiate 24 hours postop  No acute blood loss anemia  Endo:   Nonfunctioning recurrent pituitary adenoma now s/p resection:  Recommend repeat evaluation of pituitary hormones as outpatient to ensure normal function  Monitor I/O and UOP  Check urine Osm to monitor for diabetes insipidus and SIADH  Pre-diabetes:  No indication for inpatient glycemic control  Obesity Class I:  Outpatient follow up  Encourage exercise and increased physical activity  Msk/Skin: Up out of bed as tolerated  Ambulate TID  Disposition: transition to step down/medsurg unit      Code Status: Level I Full Code     Portions of the record may have been created with voice recognition software  Occasional wrong word or "sound a like" substitutions may have occurred due to the inherent limitations of voice recognition software  Read the chart carefully and recognize, using context, where substitutions have occurred  PATRICIA Echevarria           Chief Resident, PGY-3  Internal Medicine     11/8/2019 6:01 AM

## 2019-11-09 LAB
ANION GAP SERPL CALCULATED.3IONS-SCNC: 5 MMOL/L (ref 4–13)
BASOPHILS # BLD AUTO: 0.02 THOUSANDS/ΜL (ref 0–0.1)
BASOPHILS NFR BLD AUTO: 0 % (ref 0–1)
BUN SERPL-MCNC: 11 MG/DL (ref 5–25)
CALCIUM SERPL-MCNC: 9 MG/DL (ref 8.3–10.1)
CHLORIDE SERPL-SCNC: 114 MMOL/L (ref 100–108)
CO2 SERPL-SCNC: 27 MMOL/L (ref 21–32)
CREAT SERPL-MCNC: 0.96 MG/DL (ref 0.6–1.3)
EOSINOPHIL # BLD AUTO: 0 THOUSAND/ΜL (ref 0–0.61)
EOSINOPHIL NFR BLD AUTO: 0 % (ref 0–6)
ERYTHROCYTE [DISTWIDTH] IN BLOOD BY AUTOMATED COUNT: 15.4 % (ref 11.6–15.1)
GFR SERPL CREATININE-BSD FRML MDRD: 78 ML/MIN/1.73SQ M
GLUCOSE SERPL-MCNC: 119 MG/DL (ref 65–140)
HCT VFR BLD AUTO: 39.7 % (ref 34.8–46.1)
HGB BLD-MCNC: 12.4 G/DL (ref 11.5–15.4)
IMM GRANULOCYTES # BLD AUTO: 0.07 THOUSAND/UL (ref 0–0.2)
IMM GRANULOCYTES NFR BLD AUTO: 1 % (ref 0–2)
LYMPHOCYTES # BLD AUTO: 2.98 THOUSANDS/ΜL (ref 0.6–4.47)
LYMPHOCYTES NFR BLD AUTO: 21 % (ref 14–44)
MCH RBC QN AUTO: 27.2 PG (ref 26.8–34.3)
MCHC RBC AUTO-ENTMCNC: 31.2 G/DL (ref 31.4–37.4)
MCV RBC AUTO: 87 FL (ref 82–98)
MONOCYTES # BLD AUTO: 0.98 THOUSAND/ΜL (ref 0.17–1.22)
MONOCYTES NFR BLD AUTO: 7 % (ref 4–12)
NEUTROPHILS # BLD AUTO: 9.87 THOUSANDS/ΜL (ref 1.85–7.62)
NEUTS SEG NFR BLD AUTO: 71 % (ref 43–75)
NRBC BLD AUTO-RTO: 0 /100 WBCS
OSMOLALITY UR/SERPL-RTO: 300 MMOL/KG (ref 282–298)
OSMOLALITY UR: 131 MMOL/KG
PLATELET # BLD AUTO: 218 THOUSANDS/UL (ref 149–390)
PMV BLD AUTO: 12.5 FL (ref 8.9–12.7)
POTASSIUM SERPL-SCNC: 4 MMOL/L (ref 3.5–5.3)
RBC # BLD AUTO: 4.56 MILLION/UL (ref 3.81–5.12)
SODIUM SERPL-SCNC: 146 MMOL/L (ref 136–145)
WBC # BLD AUTO: 13.92 THOUSAND/UL (ref 4.31–10.16)

## 2019-11-09 PROCEDURE — 99024 POSTOP FOLLOW-UP VISIT: CPT | Performed by: PHYSICIAN ASSISTANT

## 2019-11-09 PROCEDURE — 99232 SBSQ HOSP IP/OBS MODERATE 35: CPT | Performed by: EMERGENCY MEDICINE

## 2019-11-09 PROCEDURE — 80048 BASIC METABOLIC PNL TOTAL CA: CPT | Performed by: PHYSICIAN ASSISTANT

## 2019-11-09 PROCEDURE — 85025 COMPLETE CBC W/AUTO DIFF WBC: CPT | Performed by: PHYSICIAN ASSISTANT

## 2019-11-09 RX ADMIN — SENNOSIDES 17.2 MG: 8.6 TABLET, FILM COATED ORAL at 22:21

## 2019-11-09 RX ADMIN — HEPARIN SODIUM 5000 UNITS: 5000 INJECTION INTRAVENOUS; SUBCUTANEOUS at 13:27

## 2019-11-09 RX ADMIN — AMLODIPINE BESYLATE 10 MG: 10 TABLET ORAL at 08:39

## 2019-11-09 RX ADMIN — DOCUSATE SODIUM 100 MG: 100 CAPSULE, LIQUID FILLED ORAL at 17:38

## 2019-11-09 RX ADMIN — ACETAMINOPHEN 975 MG: 325 TABLET ORAL at 22:22

## 2019-11-09 RX ADMIN — CEFAZOLIN SODIUM 2000 MG: 2 SOLUTION INTRAVENOUS at 05:35

## 2019-11-09 RX ADMIN — ATORVASTATIN CALCIUM 20 MG: 20 TABLET, FILM COATED ORAL at 17:42

## 2019-11-09 RX ADMIN — ACETAMINOPHEN 975 MG: 325 TABLET ORAL at 05:33

## 2019-11-09 RX ADMIN — METOPROLOL SUCCINATE 100 MG: 100 TABLET, EXTENDED RELEASE ORAL at 08:39

## 2019-11-09 RX ADMIN — DOCUSATE SODIUM 100 MG: 100 CAPSULE, LIQUID FILLED ORAL at 08:38

## 2019-11-09 RX ADMIN — METRONIDAZOLE 500 MG: 500 TABLET, FILM COATED ORAL at 13:27

## 2019-11-09 RX ADMIN — METRONIDAZOLE 500 MG: 500 TABLET, FILM COATED ORAL at 22:21

## 2019-11-09 RX ADMIN — METRONIDAZOLE 500 MG: 500 TABLET, FILM COATED ORAL at 05:33

## 2019-11-09 RX ADMIN — CEFAZOLIN SODIUM 2000 MG: 2 SOLUTION INTRAVENOUS at 13:28

## 2019-11-09 RX ADMIN — CEFAZOLIN SODIUM 2000 MG: 2 SOLUTION INTRAVENOUS at 22:21

## 2019-11-09 RX ADMIN — HYDROCHLOROTHIAZIDE 12.5 MG: 12.5 TABLET ORAL at 08:55

## 2019-11-09 RX ADMIN — HEPARIN SODIUM 5000 UNITS: 5000 INJECTION INTRAVENOUS; SUBCUTANEOUS at 05:37

## 2019-11-09 RX ADMIN — HEPARIN SODIUM 5000 UNITS: 5000 INJECTION INTRAVENOUS; SUBCUTANEOUS at 22:21

## 2019-11-09 RX ADMIN — ACETAMINOPHEN 975 MG: 325 TABLET ORAL at 13:27

## 2019-11-09 NOTE — PROGRESS NOTES
Daily Progress Note - Critical Care   Yamel Rocha 49 y o  female MRN: [id number]  [facility] Encounter: [id number]        ----------------------------------------------------------------------------------------  HPI/24hr events: Urine output was high earlier in the evening yesterday (>300 cc/hr)  Now 130 cc/hr  Serum Sodium stable     ---------------------------------------------------------------------------------------  SUBJECTIVE  C/o surgical pain  No visual changes, weakness,  nausea, vomiting  Does c/o a little pain with swallowing  Hx:  50 yo female with prior transphenoidal resection and now 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 now s/p endoscopic transsphenoidal resection on 11/7 with placement of nasal packing dowell balloon  Review of Systems  Review of systems was reviewed and negative unless stated above in HPI/24-hour events   ---------------------------------------------------------------------------------------  Assessment and Plan:    Neuro:   Pituitary macroadenoma s/p transphenoidal resection on 11/7:  o Continue to monitor neuro status and nasal CSF drainage  o Continue with nasal dowell and external dressing per Neurosurgery      CV:    HTN  o Continue Norvasc, Metoprolol & Hyralazine      Pulm:   No issues  o Continue routine post-op pulmonary toileting         GI:    No issues  o Continue diet    :    No issues  o Monitor urine output and renal labs      Heme/Onc:    Continue DVT prophylaxis    SC heparin    Endo:    Concern for developing DI    Currently stable urine outs and labs  o Trend urine output & serum sodium  o Outpatient hormone evals        ID:    Leukocytosis improving  o Trend temps and WBC  o Ancef &Flagyl begun for prophylaxis with CSF leak (Day 2 Abx)      MSK/Skin:    Routine turning and off-loading      Disposition: Continue Critical Care   Code Status: Level 1 - Full Code  ---------------------------------------------------------------------------------------  ICU CORE MEASURES    Prophylaxis   VTE Pharmacologic Prophylaxis: Heparin  VTE Mechanical Prophylaxis: sequential compression device  Stress Ulcer Prophylaxis: none    ABCDE Protocol (if indicated)  Plan to perform spontaneous awakening trial today? Not applicable  Plan to perform spontaneous breathing trial today? Not applicable  Obvious barriers to extubation? Not applicable  CAM-ICU: Negative    Invasive Devices Review  Invasive Devices     Peripheral Intravenous Line            Peripheral IV 19 Left Antecubital 1 day    Peripheral IV 19 Right Hand 1 day          Drain            Urethral Catheter Double-lumen;Non-latex 18 Fr  1 day              Can any invasive devices be discontinued today? Not applicable  ---------------------------------------------------------------------------------------  OBJECTIVE    Physical Exam    Vitals   Vitals:    19 0330 19 0400 19 0455 19 0500   BP: 112/62 115/68  116/72   Pulse: 64 66  74   Resp: (!) 25 (!) 27  18   Temp:   98 °F (36 7 °C)    TempSrc:       SpO2: 98% 98%  99%   Weight:       Height:         Temp (24hrs), Av 1 °F (36 7 °C), Min:97 6 °F (36 4 °C), Max:98 8 °F (37 1 °C)  Current: Temperature: 98 °F (36 7 °C)      Invasive/non-invasive ventilation settings   Respiratory    Lab Data (Last 4 hours)    None         O2/Vent Data (Last 4 hours)    None                Height and Weights   Height: 5' 3" (160 cm)  IBW: 52 4 kg  Body mass index is 26 75 kg/m²  Weight (last 2 days)     Date/Time   Weight    19 0600   68 5 (151 02) weight was done twice  Weight: weight was done twice  at 19 0600    19 0642   89 4 (197)                Intake and Output  I/O       701 -  - 700    P  O  1050 5190    I V  (mL/kg) 3090 (45 1) 40 (0 6)    IV Piggyback 50 100    Total Intake(mL/kg) 4190 (61 2) 5330 (77 8)    Urine (mL/kg/hr) 3425 (2 1) 4600 (2 8)    Blood 50     Total Output 3475 4600    Net +715 +730              UOP: 130 ml/hr     Nutrition       Diet Orders   (From admission, onward)             Start     Ordered    11/08/19 1457  Dietary nutrition supplements  Once     Question Answer Comment   Select Supplement: Ensure Enlive-Strawberry    Frequency Breakfast, Lunch, Dinner        11/08/19 1456    11/07/19 1713  Diet Regular; Regular House  Diet effective now     Question Answer Comment   Diet Type Regular    Regular Regular House    RD to adjust diet per protocol? Yes        11/07/19 1712                  Laboratory and Diagnostics:  Results from last 7 days   Lab Units 11/09/19  0427 11/08/19  0433   WBC Thousand/uL 13 92* 15 04*   HEMOGLOBIN g/dL 12 4 12 2   HEMATOCRIT % 39 7 38 7   PLATELETS Thousands/uL 218 234   NEUTROS PCT % 71  --    MONOS PCT % 7  --      Results from last 7 days   Lab Units 11/08/19  2026 11/08/19  1356 11/08/19  0433   SODIUM mmol/L 143 147* 148*   POTASSIUM mmol/L 3 4* 3 5 4 0   CHLORIDE mmol/L 111* 114* 117*   CO2 mmol/L 24 24 25   ANION GAP mmol/L 8 9 6   BUN mg/dL 11 12 12   CREATININE mg/dL 1 08 1 08 0 98   CALCIUM mg/dL 9 1 8 9 8 5   GLUCOSE RANDOM mg/dL 152* 155* 141*     Results from last 7 days   Lab Units 11/08/19  0433   MAGNESIUM mg/dL 2 4   PHOSPHORUS mg/dL 2 5*                   ABG:    VBG:          Micro        EKG: NSR  Imaging:  I have personally reviewed pertinent reports        Active Medications  Scheduled Meds:  Current Facility-Administered Medications:  acetaminophen 975 mg Oral Q8H [address] [place] RAJENDRA Leonard    amLODIPine 10 mg Oral Daily CASSANDRA Eastman-MARINO    atorvastatin 20 mg Oral Daily CASSANDRA Eastman-MARINO    bisacodyl 10 mg Rectal Daily PRN Narciso Leonard PA-C    cefazolin 2,000 mg Intravenous Q8H Narciso Leonard PA-C Last Rate: 2,000 mg (11/08/19 2154)   docusate sodium 100 mg Oral BID Narciso Leonard PA-C    heparin (porcine) 5,000 Units Subcutaneous [facility] Ling Medico, PA-C    hydrALAZINE 10 mg Intravenous Q4H PRN Ling Medico, PA-C    hydrochlorothiazide 12 5 mg Oral Daily Ling Medico, PA-C    HYDROmorphone 0 5 mg Intravenous Q1H PRN Ling Medico, PA-C    Labetalol HCl 10 mg Intravenous Q4H PRN Ling Medico, PA-C    meclizine 25 mg Oral TID PRN Ling Medico, PA-C    metoprolol succinate 100 mg Oral Daily Ling Medico, PA-C    metroNIDAZOLE 500 mg Oral [facility] Ling Medico, PA-C    naloxone 0 04 mg Intravenous Q1MIN PRN Ling Medico, PA-C    ondansetron 4 mg Intravenous Q6H PRN Ling Medico, PA-C    oxyCODONE 10 mg Oral Q4H PRN Ling Medico, PA-C    oxyCODONE 5 mg Oral Q4H PRN Ling Medico, PA-C    phenol 1 spray Mouth/Throat Q2H PRN Ling Medico, PA-C    senna 2 tablet Oral HS Ling Medico, PA-C      Continuous Infusions:     PRN Meds:     bisacodyl 10 mg Daily PRN   hydrALAZINE 10 mg Q4H PRN   HYDROmorphone 0 5 mg Q1H PRN   Labetalol HCl 10 mg Q4H PRN   meclizine 25 mg TID PRN   naloxone 0 04 mg Q1MIN PRN   ondansetron 4 mg Q6H PRN   oxyCODONE 10 mg Q4H PRN   oxyCODONE 5 mg Q4H PRN   phenol 1 spray Q2H PRN       Allergies   No Known Allergies    Advance Directive and Living Will:      Power of :    POLST:        Counseling / Coordination of Care  Total time spent today 35 minutes  Greater than 50% of total time was spent with the patient and / or family counseling and / or coordination of care  A description of the counseling / coordination of care: [address] [place] [address], PAAlondraC        Portions of the record may have been created with voice recognition software  Occasional wrong word or "sound a like" substitutions may have occurred due to the inherent limitations of voice recognition software    Read the chart carefully and recognize, using context, where substitutions have occurred

## 2019-11-09 NOTE — PLAN OF CARE
Problem: Potential for Falls  Goal: Patient will remain free of falls  Description  INTERVENTIONS:  - Assess patient frequently for physical needs  -  Identify cognitive and physical deficits and behaviors that affect risk of falls  -  [place] fall precautions as indicated by assessment   - Educate patient/family on patient safety including physical limitations  - Instruct patient to call for assistance with activity based on assessment  - Modify environment to reduce risk of injury  - Consider OT/PT consult to assist with strengthening/mobility  Outcome: Progressing     Problem: NEUROSENSORY - ADULT  Goal: Achieves stable or improved neurological status  Description  INTERVENTIONS  - Monitor and report changes in neurological status  - Monitor vital signs such as temperature, blood pressure, glucose, and any other labs ordered   - Initiate measures to prevent increased intracranial pressure  - Monitor for seizure activity and implement precautions if appropriate      Outcome: Progressing  Goal: Remains free of injury related to seizures activity  Description  INTERVENTIONS  - Maintain airway, patient safety  and administer oxygen as ordered  - Monitor patient for seizure activity, document and report duration and description of seizure to physician/advanced practitioner  - If seizure occurs,  ensure patient safety during seizure  - Reorient patient post seizure  - Seizure pads on all 4 side rails  - Instruct patient/family to notify RN of any seizure activity including if an aura is experienced  - Instruct patient/family to call for assistance with activity based on nursing assessment  - Administer anti-seizure medications if ordered    Outcome: Progressing  Goal: Achieves maximal functionality and self care  Description  INTERVENTIONS  - Monitor swallowing and airway patency with patient fatigue and changes in neurological status  - Encourage and assist patient to increase activity and self care     - Encourage visually impaired, hearing impaired and aphasic patients to use assistive/communication devices  Outcome: Progressing     Problem: CARDIOVASCULAR - ADULT  Goal: Maintains optimal cardiac output and hemodynamic stability  Description  INTERVENTIONS:  - Monitor I/O, vital signs and rhythm  - Monitor for S/S and trends of decreased cardiac output  - Administer and titrate ordered vasoactive medications to optimize hemodynamic stability  - Assess quality of pulses, skin color and temperature  - Assess for signs of decreased coronary artery perfusion  - Instruct patient to report change in severity of symptoms  Outcome: Progressing  Goal: Absence of cardiac dysrhythmias or at baseline rhythm  Description  INTERVENTIONS:  - Continuous cardiac monitoring, vital signs, obtain 12 lead EKG if ordered  - Administer antiarrhythmic and heart rate control medications as ordered  - Monitor electrolytes and administer replacement therapy as ordered  Outcome: Progressing     Problem: RESPIRATORY - ADULT  Goal: Achieves optimal ventilation and oxygenation  Description  INTERVENTIONS:  - Assess for changes in respiratory status  - Assess for changes in mentation and behavior  - Position to facilitate oxygenation and minimize respiratory effort  - Oxygen administered by appropriate delivery if ordered  - Initiate smoking cessation education as indicated  - Encourage broncho-pulmonary hygiene including cough, deep breathe, Incentive Spirometry  - Assess the need for suctioning and aspirate as needed  - Assess and instruct to report SOB or any respiratory difficulty  - Respiratory Therapy support as indicated  Outcome: Progressing     Problem: GASTROINTESTINAL - ADULT  Goal: Maintains or returns to baseline bowel function  Description  INTERVENTIONS:  - Assess bowel function  - Encourage oral fluids to ensure adequate hydration  - Administer IV fluids if ordered to ensure adequate hydration  - Administer ordered medications as needed  - Encourage mobilization and activity  - Consider nutritional services referral to assist patient with adequate nutrition and appropriate food choices  Outcome: Progressing     Problem: GENITOURINARY - ADULT  Goal: Maintains or returns to baseline urinary function  Description  INTERVENTIONS:  - Assess urinary function  - Encourage oral fluids to ensure adequate hydration if ordered  - Administer IV fluids as ordered to ensure adequate hydration  - Administer ordered medications as needed  - Offer frequent toileting  - Follow urinary retention protocol if ordered  Outcome: Progressing     Problem: METABOLIC, FLUID AND ELECTROLYTES - ADULT  Goal: Electrolytes maintained within normal limits  Description  INTERVENTIONS:  - Monitor labs and assess patient for signs and symptoms of electrolyte imbalances  - Administer electrolyte replacement as ordered  - Monitor response to electrolyte replacements, including repeat lab results as appropriate  - Instruct patient on fluid and nutrition as appropriate  Outcome: Progressing  Goal: Fluid balance maintained  Description  INTERVENTIONS:  - Monitor labs   - Monitor I/O and WT  - Instruct patient on fluid and nutrition as appropriate  - Assess for signs & symptoms of volume excess or deficit  Outcome: Progressing  Goal: Glucose maintained within target range  Description  INTERVENTIONS:  - Monitor Blood Glucose as ordered  - Assess for signs and symptoms of hyperglycemia and hypoglycemia  - Administer ordered medications to maintain glucose within target range  - Assess nutritional intake and initiate nutrition service referral as needed  Outcome: Progressing     Problem: SKIN/TISSUE INTEGRITY - ADULT  Goal: Skin integrity remains intact  Description  INTERVENTIONS  - Identify patients at risk for skin breakdown  - Assess and monitor skin integrity  - Assess and monitor nutrition and hydration status  - Monitor labs (i e  albumin)  - Assess for incontinence   - Turn and reposition patient  - Assist with mobility/ambulation  - Relieve pressure over bony prominences  - Avoid friction and shearing  - Provide appropriate hygiene as needed including keeping skin clean and dry  - Evaluate need for skin moisturizer/barrier cream  - Collaborate with interdisciplinary team (i e  Nutrition, Rehabilitation, etc )   - Patient/family teaching  Outcome: Progressing  Goal: Incision(s), wounds(s) or drain site(s) healing without S/S of infection  Description  INTERVENTIONS  - Assess and document risk factors for skin impairment   - Assess and document dressing, incision, wound bed, drain sites and surrounding tissue  - Consider nutrition services referral as needed  - Oral mucous membranes remain intact  - Provide patient/ family education  Outcome: Progressing  Goal: Oral mucous membranes remain intact  Description  INTERVENTIONS  - Assess oral mucosa and hygiene practices  - Implement preventative oral hygiene regimen  - Implement oral medicated treatments as ordered  - Initiate Nutrition services referral as needed  Outcome: Progressing     Problem: HEMATOLOGIC - ADULT  Goal: Maintains hematologic stability  Description  INTERVENTIONS  - Assess for signs and symptoms of bleeding or hemorrhage  - Monitor labs  - Administer supportive blood products/factors as ordered and appropriate  Outcome: Progressing     Problem: MUSCULOSKELETAL - ADULT  Goal: Maintain or return mobility to safest level of function  Description  INTERVENTIONS:  - Assess patient's ability to carry out ADLs; assess patient's baseline for ADL function and identify physical deficits which impact ability to perform ADLs (bathing, care of mouth/teeth, toileting, grooming, dressing, etc )  - Assess/evaluate cause of self-care deficits   - Assess range of motion  - Assess patient's mobility  - Assess patient's need for assistive devices and provide as appropriate  - Encourage maximum independence but intervene and supervise when necessary  - Involve family in performance of ADLs  - Assess for home care needs following discharge   - Consider OT consult to assist with ADL evaluation and planning for discharge  - Provide patient education as appropriate  Outcome: Progressing  Goal: Maintain proper alignment of affected body part  Description  INTERVENTIONS:  - Support, maintain and protect limb and body alignment  - Provide patient/ family with appropriate education  Outcome: Progressing     Problem: PAIN - ADULT  Goal: Verbalizes/displays adequate comfort level or baseline comfort level  Description  Interventions:  - Encourage patient to monitor pain and request assistance  - Assess pain using appropriate pain scale  - Administer analgesics based on type and severity of pain and evaluate response  - Implement non-pharmacological measures as appropriate and evaluate response  - Consider cultural and social influences on pain and pain management  - Notify physician/advanced practitioner if interventions unsuccessful or patient reports new pain  Outcome: Progressing     Problem: INFECTION - ADULT  Goal: Absence or prevention of progression during hospitalization  Description  INTERVENTIONS:  - Assess and monitor for signs and symptoms of infection  - Monitor lab/diagnostic results  - Monitor all insertion sites, i e  indwelling lines, tubes, and drains  - Monitor endotracheal if appropriate and nasal secretions for changes in amount and color  - Thelma appropriate cooling/warming therapies per order  - Administer medications as ordered  - Instruct and encourage patient and family to use good hand hygiene technique  - Identify and instruct in appropriate isolation precautions for identified infection/condition  Outcome: Progressing  Goal: Absence of fever/infection during neutropenic period  Description  INTERVENTIONS:  - Monitor WBC    Outcome: Progressing     Problem: SAFETY ADULT  Goal: Patient will remain free of falls  Description  INTERVENTIONS:  - Assess patient frequently for physical needs  -  Identify cognitive and physical deficits and behaviors that affect risk of falls    -  [place] fall precautions as indicated by assessment   - Educate patient/family on patient safety including physical limitations  - Instruct patient to call for assistance with activity based on assessment  - Modify environment to reduce risk of injury  - Consider OT/PT consult to assist with strengthening/mobility  Outcome: Progressing  Goal: Maintain or return to baseline ADL function  Description  INTERVENTIONS:  -  Assess patient's ability to carry out ADLs; assess patient's baseline for ADL function and identify physical deficits which impact ability to perform ADLs (bathing, care of mouth/teeth, toileting, grooming, dressing, etc )  - Assess/evaluate cause of self-care deficits   - Assess range of motion  - Assess patient's mobility; develop plan if impaired  - Assess patient's need for assistive devices and provide as appropriate  - Encourage maximum independence but intervene and supervise when necessary  - Involve family in performance of ADLs  - Assess for home care needs following discharge   - Consider OT consult to assist with ADL evaluation and planning for discharge  - Provide patient education as appropriate  Outcome: Progressing  Goal: Maintain or return mobility status to optimal level  Description  INTERVENTIONS:  - Assess patient's baseline mobility status (ambulation, transfers, stairs, etc )    - Identify cognitive and physical deficits and behaviors that affect mobility  - Identify mobility aids required to assist with transfers and/or ambulation (gait belt, sit-to-stand, lift, walker, cane, etc )  - [place] fall precautions as indicated by assessment  - Record patient progress and toleration of activity level on Mobility SBAR; progress patient to next Phase/Stage  - Instruct patient to call for assistance with activity based on assessment  - Consider rehabilitation consult to assist with strengthening/weightbearing, etc   Outcome: Progressing     Problem: DISCHARGE PLANNING  Goal: Discharge to home or other facility with appropriate resources  Description  INTERVENTIONS:  - Identify barriers to discharge w/patient and caregiver  - Arrange for needed discharge resources and transportation as appropriate  - Identify discharge learning needs (meds, wound care, etc )  - Arrange for interpretive services to assist at discharge as needed  - Refer to Case Management Department for coordinating discharge planning if the patient needs post-hospital services based on physician/advanced practitioner order or complex needs related to functional status, cognitive ability, or social support system  Outcome: Progressing     Problem: Knowledge Deficit  Goal: Patient/family/caregiver demonstrates understanding of disease process, treatment plan, medications, and discharge instructions  Description  Complete learning assessment and assess knowledge base  Interventions:  - Provide teaching at level of understanding  - Provide teaching via preferred learning methods  Outcome: Progressing     Problem: Nutrition/Hydration-ADULT  Goal: Nutrient/Hydration intake appropriate for improving, restoring or maintaining nutritional needs  Description  Monitor and assess patient's nutrition/hydration status for malnutrition  Collaborate with interdisciplinary team and initiate plan and interventions as ordered  Monitor patient's weight and dietary intake as ordered or per policy  Utilize nutrition screening tool and intervene as necessary  Determine patient's food preferences and provide high-protein, high-caloric foods as appropriate       INTERVENTIONS:  - Monitor oral intake, urinary output, labs, and treatment plans  - Assess nutrition and hydration status and recommend course of action  - Evaluate amount of meals eaten  - Assist patient with eating if necessary   - Allow adequate time for meals  - Recommend/ encourage appropriate diets, oral nutritional supplements, and vitamin/mineral supplements  - Order, calculate, and assess calorie counts as needed  - Recommend, monitor, and adjust tube feedings and TPN/PPN based on assessed needs  - Assess need for intravenous fluids  - Provide specific nutrition/hydration education as appropriate  - Include patient/family/caregiver in decisions related to nutrition  Outcome: Progressing

## 2019-11-10 LAB
ANION GAP SERPL CALCULATED.3IONS-SCNC: 6 MMOL/L (ref 4–13)
BUN SERPL-MCNC: 16 MG/DL (ref 5–25)
CALCIUM SERPL-MCNC: 9 MG/DL (ref 8.3–10.1)
CHLORIDE SERPL-SCNC: 112 MMOL/L (ref 100–108)
CO2 SERPL-SCNC: 28 MMOL/L (ref 21–32)
CREAT SERPL-MCNC: 0.98 MG/DL (ref 0.6–1.3)
GFR SERPL CREATININE-BSD FRML MDRD: 76 ML/MIN/1.73SQ M
GLUCOSE SERPL-MCNC: 109 MG/DL (ref 65–140)
POTASSIUM SERPL-SCNC: 3.9 MMOL/L (ref 3.5–5.3)
SODIUM SERPL-SCNC: 146 MMOL/L (ref 136–145)

## 2019-11-10 PROCEDURE — 99024 POSTOP FOLLOW-UP VISIT: CPT | Performed by: PHYSICIAN ASSISTANT

## 2019-11-10 PROCEDURE — 80048 BASIC METABOLIC PNL TOTAL CA: CPT | Performed by: PHYSICIAN ASSISTANT

## 2019-11-10 RX ADMIN — METRONIDAZOLE 500 MG: 500 TABLET, FILM COATED ORAL at 06:00

## 2019-11-10 RX ADMIN — METOPROLOL SUCCINATE 100 MG: 100 TABLET, EXTENDED RELEASE ORAL at 08:38

## 2019-11-10 RX ADMIN — AMLODIPINE BESYLATE 10 MG: 10 TABLET ORAL at 08:38

## 2019-11-10 RX ADMIN — ATORVASTATIN CALCIUM 20 MG: 20 TABLET, FILM COATED ORAL at 16:14

## 2019-11-10 RX ADMIN — CEFAZOLIN SODIUM 2000 MG: 2 SOLUTION INTRAVENOUS at 21:32

## 2019-11-10 RX ADMIN — METRONIDAZOLE 500 MG: 500 TABLET, FILM COATED ORAL at 21:31

## 2019-11-10 RX ADMIN — ACETAMINOPHEN 975 MG: 325 TABLET ORAL at 21:32

## 2019-11-10 RX ADMIN — DOCUSATE SODIUM 100 MG: 100 CAPSULE, LIQUID FILLED ORAL at 08:38

## 2019-11-10 RX ADMIN — HEPARIN SODIUM 5000 UNITS: 5000 INJECTION INTRAVENOUS; SUBCUTANEOUS at 14:20

## 2019-11-10 RX ADMIN — METRONIDAZOLE 500 MG: 500 TABLET, FILM COATED ORAL at 14:20

## 2019-11-10 RX ADMIN — ACETAMINOPHEN 975 MG: 325 TABLET ORAL at 14:20

## 2019-11-10 RX ADMIN — CEFAZOLIN SODIUM 2000 MG: 2 SOLUTION INTRAVENOUS at 06:00

## 2019-11-10 RX ADMIN — CEFAZOLIN SODIUM 2000 MG: 2 SOLUTION INTRAVENOUS at 14:20

## 2019-11-10 RX ADMIN — DOCUSATE SODIUM 100 MG: 100 CAPSULE, LIQUID FILLED ORAL at 18:06

## 2019-11-10 RX ADMIN — HEPARIN SODIUM 5000 UNITS: 5000 INJECTION INTRAVENOUS; SUBCUTANEOUS at 06:00

## 2019-11-10 RX ADMIN — SENNOSIDES 17.2 MG: 8.6 TABLET, FILM COATED ORAL at 21:31

## 2019-11-10 RX ADMIN — ACETAMINOPHEN 975 MG: 325 TABLET ORAL at 06:00

## 2019-11-10 RX ADMIN — HYDROCHLOROTHIAZIDE 12.5 MG: 12.5 TABLET ORAL at 08:38

## 2019-11-10 RX ADMIN — HEPARIN SODIUM 5000 UNITS: 5000 INJECTION INTRAVENOUS; SUBCUTANEOUS at 21:33

## 2019-11-10 NOTE — PLAN OF CARE
Problem: Potential for Falls  Goal: Patient will remain free of falls  Description  INTERVENTIONS:  - Assess patient frequently for physical needs  -  Identify cognitive and physical deficits and behaviors that affect risk of falls  -  [place] fall precautions as indicated by assessment   - Educate patient/family on patient safety including physical limitations  - Instruct patient to call for assistance with activity based on assessment  - Modify environment to reduce risk of injury  - Consider OT/PT consult to assist with strengthening/mobility  Outcome: Progressing     Problem: NEUROSENSORY - ADULT  Goal: Achieves stable or improved neurological status  Description  INTERVENTIONS  - Monitor and report changes in neurological status  - Monitor vital signs such as temperature, blood pressure, glucose, and any other labs ordered   - Initiate measures to prevent increased intracranial pressure  - Monitor for seizure activity and implement precautions if appropriate      Outcome: Progressing  Goal: Remains free of injury related to seizures activity  Description  INTERVENTIONS  - Maintain airway, patient safety  and administer oxygen as ordered  - Monitor patient for seizure activity, document and report duration and description of seizure to physician/advanced practitioner  - If seizure occurs,  ensure patient safety during seizure  - Reorient patient post seizure  - Seizure pads on all 4 side rails  - Instruct patient/family to notify RN of any seizure activity including if an aura is experienced  - Instruct patient/family to call for assistance with activity based on nursing assessment  - Administer anti-seizure medications if ordered    Outcome: Progressing  Goal: Achieves maximal functionality and self care  Description  INTERVENTIONS  - Monitor swallowing and airway patency with patient fatigue and changes in neurological status  - Encourage and assist patient to increase activity and self care     - Encourage visually impaired, hearing impaired and aphasic patients to use assistive/communication devices  Outcome: Progressing     Problem: CARDIOVASCULAR - ADULT  Goal: Maintains optimal cardiac output and hemodynamic stability  Description  INTERVENTIONS:  - Monitor I/O, vital signs and rhythm  - Monitor for S/S and trends of decreased cardiac output  - Administer and titrate ordered vasoactive medications to optimize hemodynamic stability  - Assess quality of pulses, skin color and temperature  - Assess for signs of decreased coronary artery perfusion  - Instruct patient to report change in severity of symptoms  Outcome: Progressing  Goal: Absence of cardiac dysrhythmias or at baseline rhythm  Description  INTERVENTIONS:  - Continuous cardiac monitoring, vital signs, obtain 12 lead EKG if ordered  - Administer antiarrhythmic and heart rate control medications as ordered  - Monitor electrolytes and administer replacement therapy as ordered  Outcome: Progressing     Problem: RESPIRATORY - ADULT  Goal: Achieves optimal ventilation and oxygenation  Description  INTERVENTIONS:  - Assess for changes in respiratory status  - Assess for changes in mentation and behavior  - Position to facilitate oxygenation and minimize respiratory effort  - Oxygen administered by appropriate delivery if ordered  - Initiate smoking cessation education as indicated  - Encourage broncho-pulmonary hygiene including cough, deep breathe, Incentive Spirometry  - Assess the need for suctioning and aspirate as needed  - Assess and instruct to report SOB or any respiratory difficulty  - Respiratory Therapy support as indicated  Outcome: Progressing     Problem: GASTROINTESTINAL - ADULT  Goal: Maintains or returns to baseline bowel function  Description  INTERVENTIONS:  - Assess bowel function  - Encourage oral fluids to ensure adequate hydration  - Administer IV fluids if ordered to ensure adequate hydration  - Administer ordered medications as needed  - Encourage mobilization and activity  - Consider nutritional services referral to assist patient with adequate nutrition and appropriate food choices  Outcome: Progressing     Problem: GENITOURINARY - ADULT  Goal: Maintains or returns to baseline urinary function  Description  INTERVENTIONS:  - Assess urinary function  - Encourage oral fluids to ensure adequate hydration if ordered  - Administer IV fluids as ordered to ensure adequate hydration  - Administer ordered medications as needed  - Offer frequent toileting  - Follow urinary retention protocol if ordered  Outcome: Progressing     Problem: METABOLIC, FLUID AND ELECTROLYTES - ADULT  Goal: Electrolytes maintained within normal limits  Description  INTERVENTIONS:  - Monitor labs and assess patient for signs and symptoms of electrolyte imbalances  - Administer electrolyte replacement as ordered  - Monitor response to electrolyte replacements, including repeat lab results as appropriate  - Instruct patient on fluid and nutrition as appropriate  Outcome: Progressing  Goal: Fluid balance maintained  Description  INTERVENTIONS:  - Monitor labs   - Monitor I/O and WT  - Instruct patient on fluid and nutrition as appropriate  - Assess for signs & symptoms of volume excess or deficit  Outcome: Progressing  Goal: Glucose maintained within target range  Description  INTERVENTIONS:  - Monitor Blood Glucose as ordered  - Assess for signs and symptoms of hyperglycemia and hypoglycemia  - Administer ordered medications to maintain glucose within target range  - Assess nutritional intake and initiate nutrition service referral as needed  Outcome: Progressing     Problem: SKIN/TISSUE INTEGRITY - ADULT  Goal: Skin integrity remains intact  Description  INTERVENTIONS  - Identify patients at risk for skin breakdown  - Assess and monitor skin integrity  - Assess and monitor nutrition and hydration status  - Monitor labs (i e  albumin)  - Assess for incontinence   - Turn and reposition patient  - Assist with mobility/ambulation  - Relieve pressure over bony prominences  - Avoid friction and shearing  - Provide appropriate hygiene as needed including keeping skin clean and dry  - Evaluate need for skin moisturizer/barrier cream  - Collaborate with interdisciplinary team (i e  Nutrition, Rehabilitation, etc )   - Patient/family teaching  Outcome: Progressing  Goal: Incision(s), wounds(s) or drain site(s) healing without S/S of infection  Description  INTERVENTIONS  - Assess and document risk factors for skin impairment   - Assess and document dressing, incision, wound bed, drain sites and surrounding tissue  - Consider nutrition services referral as needed  - Oral mucous membranes remain intact  - Provide patient/ family education  Outcome: Progressing  Goal: Oral mucous membranes remain intact  Description  INTERVENTIONS  - Assess oral mucosa and hygiene practices  - Implement preventative oral hygiene regimen  - Implement oral medicated treatments as ordered  - Initiate Nutrition services referral as needed  Outcome: Progressing     Problem: HEMATOLOGIC - ADULT  Goal: Maintains hematologic stability  Description  INTERVENTIONS  - Assess for signs and symptoms of bleeding or hemorrhage  - Monitor labs  - Administer supportive blood products/factors as ordered and appropriate  Outcome: Progressing     Problem: MUSCULOSKELETAL - ADULT  Goal: Maintain or return mobility to safest level of function  Description  INTERVENTIONS:  - Assess patient's ability to carry out ADLs; assess patient's baseline for ADL function and identify physical deficits which impact ability to perform ADLs (bathing, care of mouth/teeth, toileting, grooming, dressing, etc )  - Assess/evaluate cause of self-care deficits   - Assess range of motion  - Assess patient's mobility  - Assess patient's need for assistive devices and provide as appropriate  - Encourage maximum independence but intervene and supervise when necessary  - Involve family in performance of ADLs  - Assess for home care needs following discharge   - Consider OT consult to assist with ADL evaluation and planning for discharge  - Provide patient education as appropriate  Outcome: Progressing  Goal: Maintain proper alignment of affected body part  Description  INTERVENTIONS:  - Support, maintain and protect limb and body alignment  - Provide patient/ family with appropriate education  Outcome: Progressing     Problem: PAIN - ADULT  Goal: Verbalizes/displays adequate comfort level or baseline comfort level  Description  Interventions:  - Encourage patient to monitor pain and request assistance  - Assess pain using appropriate pain scale  - Administer analgesics based on type and severity of pain and evaluate response  - Implement non-pharmacological measures as appropriate and evaluate response  - Consider cultural and social influences on pain and pain management  - Notify physician/advanced practitioner if interventions unsuccessful or patient reports new pain  Outcome: Progressing     Problem: INFECTION - ADULT  Goal: Absence or prevention of progression during hospitalization  Description  INTERVENTIONS:  - Assess and monitor for signs and symptoms of infection  - Monitor lab/diagnostic results  - Monitor all insertion sites, i e  indwelling lines, tubes, and drains  - Monitor endotracheal if appropriate and nasal secretions for changes in amount and color  - [place] appropriate cooling/warming therapies per order  - Administer medications as ordered  - Instruct and encourage patient and family to use good hand hygiene technique  - Identify and instruct in appropriate isolation precautions for identified infection/condition  Outcome: Progressing  Goal: Absence of fever/infection during neutropenic period  Description  INTERVENTIONS:  - Monitor WBC    Outcome: Progressing     Problem: SAFETY ADULT  Goal: Patient will remain free of falls  Description  INTERVENTIONS:  - Assess patient frequently for physical needs  -  Identify cognitive and physical deficits and behaviors that affect risk of falls    -  [place] fall precautions as indicated by assessment   - Educate patient/family on patient safety including physical limitations  - Instruct patient to call for assistance with activity based on assessment  - Modify environment to reduce risk of injury  - Consider OT/PT consult to assist with strengthening/mobility  Outcome: Progressing  Goal: Maintain or return to baseline ADL function  Description  INTERVENTIONS:  -  Assess patient's ability to carry out ADLs; assess patient's baseline for ADL function and identify physical deficits which impact ability to perform ADLs (bathing, care of mouth/teeth, toileting, grooming, dressing, etc )  - Assess/evaluate cause of self-care deficits   - Assess range of motion  - Assess patient's mobility; develop plan if impaired  - Assess patient's need for assistive devices and provide as appropriate  - Encourage maximum independence but intervene and supervise when necessary  - Involve family in performance of ADLs  - Assess for home care needs following discharge   - Consider OT consult to assist with ADL evaluation and planning for discharge  - Provide patient education as appropriate  Outcome: Progressing  Goal: Maintain or return mobility status to optimal level  Description  INTERVENTIONS:  - Assess patient's baseline mobility status (ambulation, transfers, stairs, etc )    - Identify cognitive and physical deficits and behaviors that affect mobility  - Identify mobility aids required to assist with transfers and/or ambulation (gait belt, sit-to-stand, lift, walker, cane, etc )  - [place] fall precautions as indicated by assessment  - Record patient progress and toleration of activity level on Mobility SBAR; progress patient to next Phase/Stage  - Instruct patient to call for assistance with activity based on assessment  - Consider rehabilitation consult to assist with strengthening/weightbearing, etc   Outcome: Progressing     Problem: DISCHARGE PLANNING  Goal: Discharge to home or other facility with appropriate resources  Description  INTERVENTIONS:  - Identify barriers to discharge w/patient and caregiver  - Arrange for needed discharge resources and transportation as appropriate  - Identify discharge learning needs (meds, wound care, etc )  - Arrange for interpretive services to assist at discharge as needed  - Refer to Case Management Department for coordinating discharge planning if the patient needs post-hospital services based on physician/advanced practitioner order or complex needs related to functional status, cognitive ability, or social support system  Outcome: Progressing     Problem: Knowledge Deficit  Goal: Patient/family/caregiver demonstrates understanding of disease process, treatment plan, medications, and discharge instructions  Description  Complete learning assessment and assess knowledge base  Interventions:  - Provide teaching at level of understanding  - Provide teaching via preferred learning methods  Outcome: Progressing     Problem: Nutrition/Hydration-ADULT  Goal: Nutrient/Hydration intake appropriate for improving, restoring or maintaining nutritional needs  Description  Monitor and assess patient's nutrition/hydration status for malnutrition  Collaborate with interdisciplinary team and initiate plan and interventions as ordered  Monitor patient's weight and dietary intake as ordered or per policy  Utilize nutrition screening tool and intervene as necessary  Determine patient's food preferences and provide high-protein, high-caloric foods as appropriate       INTERVENTIONS:  - Monitor oral intake, urinary output, labs, and treatment plans  - Assess nutrition and hydration status and recommend course of action  - Evaluate amount of meals eaten  - Assist patient with eating if necessary   - Allow adequate time for meals  - Recommend/ encourage appropriate diets, oral nutritional supplements, and vitamin/mineral supplements  - Order, calculate, and assess calorie counts as needed  - Recommend, monitor, and adjust tube feedings and TPN/PPN based on assessed needs  - Assess need for intravenous fluids  - Provide specific nutrition/hydration education as appropriate  - Include patient/family/caregiver in decisions related to nutrition  Outcome: Progressing

## 2019-11-10 NOTE — ASSESSMENT & PLAN NOTE
Neuro:    POD 2 s/p Image guided endoscopic transnasal transsphenoidal resection of pituitary mass, abdominal fat graft  Had intraop leak that was sealed  No Lumbar drain in place  Improved nasal drainage since removal of nasal Sesay  · Continue regular neurologic checks  · Imaging reviewed personally and by attending  Final results as below  · CT head wo 11/8/19: Expected post op changes s/p pituitary mass resection, no significant/large hemorrhage noted post op  · Pain control per primary team      ENT: No use of straws, no sneezing or blowing the nose- discussed with pt  Nasal Sesay removed this morning by ENT  Monitor drainage from nose: please notify neurosurgery of any significant rhinorrhea  Pulm: no incentive spirometry use  Recommend deep breathing and cough  :  Strict monitoring of Inputs and outputs  Heme:   WBC 11/8: 15 04 -> 13 92 today, likely reactive  Hgb stable at 12 4  BMP 11/8: Na elevated at 146, continue to monitor  K wnl at 4 0  Continue to monitor  Endo:   · Continue to monitor DI  · Net I/O + 730/ last 24 hrs  · Na elevated at 146  FEN:  · Fluids: Pt tolerating oral fluid intake  · Nutrition: Pt tolerating oral intake  MSK/SKIN:   · Eval and mobilize per PT/OT - recommend home with family support  · DVT PPX: SCDs  [address]      Neurosurgery will continue to follow as primary  Please call with any questions or concerns

## 2019-11-10 NOTE — PROGRESS NOTES
Progress Note - Yamileth Mercerbourne 1965, 49 y o  female MRN: [id number]    Unit/Bed#: [facility] 519-36 Encounter: [id number]    Primary Care Provider: Kayce Montiel DO   Date and time admitted to hospital: 11/7/2019  6:21 AM        * Status post transsphenoidal pituitary resection [facility])  Assessment & Plan  Neuro:    POD 3 s/p Image guided endoscopic transnasal transsphenoidal resection of pituitary mass, abdominal fat graft  Had intraop leak that was sealed  No Lumbar drain in place  Improved nasal drainage since removal of nasal Sesay  No rhinorrhea on exam    · Continue regular neurologic checks  · Imaging reviewed personally and by attending  Final results as below  · CT head wo 11/8/19: Expected post op changes s/p pituitary mass resection, no significant/large hemorrhage noted post op  · Pain control - only using Tylenol    ENT: No use of straws, no sneezing or blowing the nose- discussed with pt  Nasal Sesay removed this morning by ENT  Monitor drainage from nose: please notify neurosurgery of any significant rhinorrhea  Pulm: no incentive spirometry use  Recommend deep breathing and cough  :  Strict monitoring of Inputs and outputs  Heme:   WBC 11/8: 15 04 -> 13 92 today, likely reactive  Hgb stable at 12 4  BMP 11/10: Na stable at 146, continue to monitor  K wnl at 3 9  Continue to monitor  Endo:   · Continue to monitor DI  Urine concentrated and bright yellow  · Na elevated at 146  FEN:  · Fluids: Pt tolerating oral fluid intake  Encouraged to drink to thirst  · Nutrition: Pt tolerating oral intake  MSK/SKIN:   · Eval and mobilize per PT/OT - recommend home with family support  · DVT PPX: SCDs  HSQ    Anticipate discharge home tomorrow pending medical stability  Neurosurgery will continue to follow as primary  Please call with any questions or concerns        Compression of optic chiasm  Assessment & Plan  Status post resection of pituitary mass  See plan as above    Hypertension  Assessment & Plan  Continue to monitor  Continue current regimen      Subjective/Objective   Chief Complaint:  Postoperative follow-up    Subjective:  Patient continues with poor sleep overnight  She admits to some mild pain which resolved with Tylenol  Denies any vision or speech changes  Denies any weakness or numbness of her extremities  Tolerating oral intake  Denies any chest pain, shortness breath, nausea or vomiting  Voiding well  Drinking to thirst   Coughing up sputum which has some blood at times  Objective: Sitting up in bed  NAD  I/O       11/08 0701 - 11/09 0700 11/09 0701 - 11/10 0700 11/10 0701 - 11/11 0700    P  O  5190 1685 600    I V  (mL/kg) 40 (0 5)      IV Piggyback 100 50     Total Intake(mL/kg) 5330 (62) 1735 (20 2) 600 (7)    Urine (mL/kg/hr) 5100 (2 5) 1800 (0 9) 1000 (1 4)    Blood       Total Output 5100 1800 1000    Net +230 -65 -400                 Invasive Devices     Peripheral Intravenous Line            Peripheral IV 11/07/19 Left Antecubital 3 days    Peripheral IV 11/07/19 Right Hand 3 days                Physical Exam:  Vitals: Blood pressure 131/80, pulse 90, temperature 99 3 °F (37 4 °C), resp  rate 18, height 5' 3" (1 6 m), weight 86 kg (189 lb 9 5 oz), SpO2 97 %  ,Body mass index is 33 59 kg/m²      General appearance: alert, appears stated age, cooperative and no distress  Head: Normocephalic, without obvious abnormality, atraumatic  Eyes: EOMI, PERRL, VFFC  Nose:  No rhinorrhea with leaning forward for approximately 3 minutes  Neck: supple, symmetrical, trachea midline   Lungs: non labored breathing  Heart: regular heart rate  Neurologic:   Mental status: Alert, oriented, thought content appropriate  Cranial nerves: grossly intact (Cranial nerves II-XII)  Sensory: normal to LT x4   Motor: moving all extremities without focal weakness, 5/5 BUE/BLE  Reflexes:  No clonus bilaterally  Coordination: finger to nose normal bilaterally, no drift bilaterally    Lab Results:  Results from last 7 days   Lab Units 11/09/19  0427 11/08/19  0433   WBC Thousand/uL 13 92* 15 04*   HEMOGLOBIN g/dL 12 4 12 2   HEMATOCRIT % 39 7 38 7   PLATELETS Thousands/uL 218 234   NEUTROS PCT % 71  --    MONOS PCT % 7  --      Results from last 7 days   Lab Units 11/10/19  0503 11/09/19  0427 11/08/19  2026   POTASSIUM mmol/L 3 9 4 0 3 4*   CHLORIDE mmol/L 112* 114* 111*   CO2 mmol/L 28 27 24   BUN mg/dL 16 11 11   CREATININE mg/dL 0 98 0 96 1 08   CALCIUM mg/dL 9 0 9 0 9 1     Results from last 7 days   Lab Units 11/08/19  0433   MAGNESIUM mg/dL 2 4     Results from last 7 days   Lab Units 11/08/19  0433   PHOSPHORUS mg/dL 2 5*         No results found for: TROPONINT  ABG:No results found for: PHART, JMI3HPU, PO2ART, BDO6BVL, P1SEKXDW, BEART, SOURCE    Imaging Studies: I have personally reviewed pertinent reports  and I have personally reviewed pertinent films in PACS    Ct Head Wo Contrast    Result Date: 11/8/2019  Impression: Status post interval transsphenoidal resection of a previously identified pituitary adenoma with postsurgical changes as described above  No large postoperative intracranial hematoma identified  Workstation performed: [id number]     EKG, Pathology, and Other Studies: I have personally reviewed pertinent reports        VTE Pharmacologic Prophylaxis: Heparin    VTE Mechanical Prophylaxis: sequential compression device

## 2019-11-10 NOTE — ASSESSMENT & PLAN NOTE
Neuro:    POD 3 s/p Image guided endoscopic transnasal transsphenoidal resection of pituitary mass, abdominal fat graft  Had intraop leak that was sealed  No Lumbar drain in place  Improved nasal drainage since removal of nasal Dowell  No rhinorrhea on exam    · Continue regular neurologic checks  · Imaging reviewed personally and by attending  Final results as below  · CT head wo 11/8/19: Expected post op changes s/p pituitary mass resection, no significant/large hemorrhage noted post op  · Pain control - only using Tylenol    ENT: No use of straws, no sneezing or blowing the nose- discussed with pt  Nasal Dowell removed this morning by ENT  Monitor drainage from nose: please notify neurosurgery of any significant rhinorrhea  Pulm: no incentive spirometry use  Recommend deep breathing and cough  :  Strict monitoring of Inputs and outputs  Heme:   WBC 11/8: 15 04 -> 13 92 today, likely reactive  Hgb stable at 12 4  BMP 11/10: Na stable at 146, continue to monitor  K wnl at 3 9  Continue to monitor  Endo:   · Continue to monitor DI  Urine concentrated and bright yellow  · Na elevated at 146  ID:  · Continues on IV antibiotics  Likely d/c as no rhinorrhea today and nasal dowell removed  FEN:  · Fluids: Pt tolerating oral fluid intake  Encouraged to drink to thirst  · Nutrition: Pt tolerating oral intake  MSK/SKIN:   · Eval and mobilize per PT/OT - recommend home with family support  · DVT PPX: SCDs  HSQ    Anticipate discharge home tomorrow pending medical stability  Neurosurgery will continue to follow as primary  Please call with any questions or concerns

## 2019-11-10 NOTE — PROGRESS NOTES
Progress Note - Victor M Leon 1965, 49 y o  female MRN: [id number]    Unit/Bed#: [facility] 313-97 Encounter: [id number]    Primary Care Provider: Domingo David DO   Date and time admitted to hospital: 11/7/2019  6:21 AM        * Status post transsphenoidal pituitary resection [facility])  Assessment & Plan  Neuro:    POD 2 s/p Image guided endoscopic transnasal transsphenoidal resection of pituitary mass, abdominal fat graft  Had intraop leak that was sealed  No Lumbar drain in place  Improved nasal drainage since removal of nasal Sesay  · Continue regular neurologic checks  · Imaging reviewed personally and by attending  Final results as below  · CT head wo 11/8/19: Expected post op changes s/p pituitary mass resection, no significant/large hemorrhage noted post op  · Pain control per primary team      ENT: No use of straws, no sneezing or blowing the nose- discussed with pt  Nasal Sesay removed this morning by ENT  Monitor drainage from nose: please notify neurosurgery of any significant rhinorrhea  Pulm: no incentive spirometry use  Recommend deep breathing and cough  :  Strict monitoring of Inputs and outputs  Heme:   WBC 11/8: 15 04 -> 13 92 today, likely reactive  Hgb stable at 12 4  BMP 11/8: Na elevated at 146, continue to monitor  K wnl at 4 0  Continue to monitor  Endo:   · Continue to monitor DI  · Net I/O + 730/ last 24 hrs  · Na elevated at 146  FEN:  · Fluids: Pt tolerating oral fluid intake  · Nutrition: Pt tolerating oral intake  MSK/SKIN:   · Eval and mobilize per PT/OT - recommend home with family support  · DVT PPX: SCDs  [address]      Neurosurgery will continue to follow as primary  Please call with any questions or concerns        Compression of optic chiasm  Assessment & Plan  Status post resection of pituitary mass  See plan as above    Hypertension  Assessment & Plan  Continue to monitor  Continue current regimen        Subjective/Objective   Chief Complaint:  I am fine    Subjective:  Patient admits to poor sleep overnight  In no complaints of headache, vision or speech changes  Head nasal Sesay removed today without difficulties  Has noted decreased drainage from no since removal   Tolerating oral intake without nausea vomiting  Continues to drink to thirst   Variable urine output  Objective:  Sitting up in chair  NAD  I/O       11/08 0701 - 11/09 0700 11/09 0701 - 11/10 0700    P  O  5190 1685    I V  (mL/kg) 40 (0 5)     IV Piggyback 100 50    Total Intake(mL/kg) 5330 (62) 1735 (20 2)    Urine (mL/kg/hr) 5100 (2 5) 400 (0 3)    Blood      Total Output 5100 400    Net +230 +1335                Invasive Devices     Peripheral Intravenous Line            Peripheral IV 11/07/19 Left Antecubital 2 days    Peripheral IV 11/07/19 Right Hand 2 days          Drain            Urethral Catheter Double-lumen;Non-latex 18 Fr  2 days                Physical Exam:  Vitals: Blood pressure 102/67, pulse 95, temperature 99 7 °F (37 6 °C), temperature source Oral, resp  rate 20, height 5' 3" (1 6 m), weight 86 kg (189 lb 9 5 oz), SpO2 97 %  ,Body mass index is 33 59 kg/m²  General appearance: alert, appears stated age, cooperative and no distress  Head: Normocephalic, without obvious abnormality, atraumatic  Eyes: EOMI, PERRL   VFFC  Neck: supple, symmetrical, trachea midline  Lungs: non labored breathing  Heart: regular heart rate  Neurologic:   Mental status: Alert, oriented, thought content appropriate  Cranial nerves: grossly intact (Cranial nerves II-XII)  Sensory: normal to LT x4  Motor: moving all extremities without focal weakness, 5/5  Coordination: finger to nose normal bilaterally, no drift bilaterally    Lab Results:  Results from last 7 days   Lab Units 11/09/19  0427 11/08/19  0433   WBC Thousand/uL 13 92* 15 04*   HEMOGLOBIN g/dL 12 4 12 2   HEMATOCRIT % 39 7 38 7   PLATELETS Thousands/uL 218 234   NEUTROS PCT % 71  --    MONOS PCT % 7  --      Results from last 7 days   Lab Units 11/09/19 0427 11/08/19 2026 11/08/19  1356   POTASSIUM mmol/L 4 0 3 4* 3 5   CHLORIDE mmol/L 114* 111* 114*   CO2 mmol/L 27 24 24   BUN mg/dL 11 11 12   CREATININE mg/dL 0 96 1 08 1 08   CALCIUM mg/dL 9 0 9 1 8 9     Results from last 7 days   Lab Units 11/08/19  0433   MAGNESIUM mg/dL 2 4     Results from last 7 days   Lab Units 11/08/19  0433   PHOSPHORUS mg/dL 2 5*         No results found for: TROPONINT  ABG:No results found for: PHART, YMF7OIU, PO2ART, WBO2AII, I0XTBTVH, BEART, SOURCE    Imaging Studies: I have personally reviewed pertinent reports  and I have personally reviewed pertinent films in PACS    Ct Head Wo Contrast    Result Date: 11/8/2019  Impression: Status post interval transsphenoidal resection of a previously identified pituitary adenoma with postsurgical changes as described above  No large postoperative intracranial hematoma identified  Workstation performed: [id number]     EKG, Pathology, and Other Studies: I have personally reviewed pertinent reports        VTE Pharmacologic Prophylaxis: Heparin    VTE Mechanical Prophylaxis: sequential compression device

## 2019-11-11 ENCOUNTER — TRANSITIONAL CARE MANAGEMENT (OUTPATIENT)
Dept: FAMILY MEDICINE CLINIC | Facility: CLINIC | Age: 54
End: 2019-11-11

## 2019-11-11 VITALS
BODY MASS INDEX: 33.59 KG/M2 | SYSTOLIC BLOOD PRESSURE: 142 MMHG | TEMPERATURE: 99.7 F | WEIGHT: 189.6 LBS | DIASTOLIC BLOOD PRESSURE: 97 MMHG | HEART RATE: 70 BPM | RESPIRATION RATE: 18 BRPM | HEIGHT: 63 IN | OXYGEN SATURATION: 98 %

## 2019-11-11 PROCEDURE — 99024 POSTOP FOLLOW-UP VISIT: CPT | Performed by: NEUROLOGICAL SURGERY

## 2019-11-11 PROCEDURE — NC001 PR NO CHARGE: Performed by: NEUROLOGICAL SURGERY

## 2019-11-11 RX ORDER — ACETAMINOPHEN 325 MG/1
650 TABLET ORAL EVERY 6 HOURS PRN
Qty: 30 TABLET | Refills: 0 | Status: SHIPPED | OUTPATIENT
Start: 2019-11-11 | End: 2020-06-26 | Stop reason: ALTCHOICE

## 2019-11-11 RX ORDER — OXYCODONE HYDROCHLORIDE 5 MG/1
5 TABLET ORAL AS NEEDED
Qty: 20 TABLET | Refills: 0 | Status: SHIPPED | OUTPATIENT
Start: 2019-11-11 | End: 2019-11-21

## 2019-11-11 RX ORDER — DOCUSATE SODIUM 100 MG/1
100 CAPSULE, LIQUID FILLED ORAL 2 TIMES DAILY
Qty: 10 CAPSULE | Refills: 0 | Status: SHIPPED | OUTPATIENT
Start: 2019-11-11 | End: 2021-08-17

## 2019-11-11 RX ORDER — SENNOSIDES 8.6 MG
2 TABLET ORAL
Qty: 30 EACH | Refills: 0 | Status: SHIPPED | OUTPATIENT
Start: 2019-11-11 | End: 2020-03-06

## 2019-11-11 RX ADMIN — METRONIDAZOLE 500 MG: 500 TABLET, FILM COATED ORAL at 05:16

## 2019-11-11 RX ADMIN — AMLODIPINE BESYLATE 10 MG: 10 TABLET ORAL at 08:47

## 2019-11-11 RX ADMIN — METRONIDAZOLE 500 MG: 500 TABLET, FILM COATED ORAL at 13:03

## 2019-11-11 RX ADMIN — HEPARIN SODIUM 5000 UNITS: 5000 INJECTION INTRAVENOUS; SUBCUTANEOUS at 13:03

## 2019-11-11 RX ADMIN — HYDROCHLOROTHIAZIDE 12.5 MG: 12.5 TABLET ORAL at 08:46

## 2019-11-11 RX ADMIN — METOPROLOL SUCCINATE 100 MG: 100 TABLET, EXTENDED RELEASE ORAL at 08:47

## 2019-11-11 RX ADMIN — ACETAMINOPHEN 975 MG: 325 TABLET ORAL at 05:16

## 2019-11-11 RX ADMIN — CEFAZOLIN SODIUM 2000 MG: 2 SOLUTION INTRAVENOUS at 05:17

## 2019-11-11 RX ADMIN — HEPARIN SODIUM 5000 UNITS: 5000 INJECTION INTRAVENOUS; SUBCUTANEOUS at 05:17

## 2019-11-11 RX ADMIN — DOCUSATE SODIUM 100 MG: 100 CAPSULE, LIQUID FILLED ORAL at 08:46

## 2019-11-11 RX ADMIN — CEFAZOLIN SODIUM 2000 MG: 2 SOLUTION INTRAVENOUS at 13:03

## 2019-11-11 RX ADMIN — ACETAMINOPHEN 975 MG: 325 TABLET ORAL at 13:03

## 2019-11-11 NOTE — DISCHARGE INSTRUCTIONS
Neurosurgery discharge Instructions after resection of Pituitary mass   Please refrain from sipping through straws   No heavy lifting > than 10lbs   No Advil or Motrin, take Tylenol instead   You may resume ASA, Coumadin, and Plavix once cleared by Neurosurgery   No driving for 2 weeks or while taking narcotics   It may take several weeks for your pituitary gland to work properly and you might need hormone replacement   Be sure to complete the prescribed steroid therapy treatment   Be sure to follow up with the Neurosurgeon and/or the Endocrinologist, and obtain the necessary blood work ordered to ensure a full recovery   Please have your MRI of brain completed prior to your 6 week follow-up appointment   Return to the ER if you develop clear drainage from nose, or salty taste in mouth with or without associated headache or increased and frequent urination, and excessive thirst     Return to the ER if you develop a fever greater than 101 5, severe headache and/or inappropriate drowsiness   Please return for your 2 week follow-up appointment as scheduled  Follow-up 6 weeks post-op with Dr Aminata Villalobos at the [facility]   Please obtain a BMP (labs) prior to 2 week follow-up

## 2019-11-11 NOTE — PROGRESS NOTES
Progress Note - Brian Elder 1965, 49 y o  female MRN: [id number]    Unit/Bed#: [facility] 268-48 Encounter: [id number]    Primary Care Provider: Bryanna Somers DO   Date and time admitted to hospital: 11/7/2019  6:21 AM        * Status post transsphenoidal pituitary resection [facility])  Assessment & Plan  Neuro:    POD 4 s/p Image guided endoscopic transnasal transsphenoidal resection of pituitary mass, abdominal fat graft  Had intraop leak that was sealed  No Lumbar drain in place  Improved nasal drainage since removal of nasal Dowell  No rhinorrhea on exam    · Continue regular neurologic checks  · Imaging reviewed personally and by attending  Final results as below  · CT head wo 11/8/19: Expected post op changes s/p pituitary mass resection, no significant/large hemorrhage noted post op  · Pain control - only using Tylenol    ENT: No use of straws, no sneezing or blowing the nose- discussed with pt  Nasal Dowell removed over the weekend by ENT  Monitor drainage from nose: please notify neurosurgery of any significant rhinorrhea  Pulm: no incentive spirometry use  Recommend deep breathing and cough  :  Strict monitoring of Inputs and outputs  GI: Pt had not had BM since surgery , will consider giving pt Mag Citrate 1 dose to take at home if no BM  Pt has been taking Senna and Colace for bowel regimen  Heme:   WBC 11/8: 15 04 -> 11/10 13 92 to, likely reactive  Hgb stable at 12 4  BMP 11/10: Na stable at 146, continue to monitor  K wnl at 3 9  Continue to monitor  Endo:   · Continue to monitor DI  Urine concentrated and bright yellow  · Na elevated at 146  ID:  · Continues on IV antibiotics  Likely d/c as no significant rhinorrhea and nasal dowell removed  FEN:  · Fluids: Pt tolerating oral fluid intake  Encouraged to drink to thirst  · Nutrition: Pt tolerating oral intake  MSK/SKIN:   · Eval and mobilize per PT/OT - recommend home with family support  · DVT PPX: SCDs   HSQ     Pt is neurologically and medically stable for discharge  Pt has follow-up scheduled with neurosurgery in 2 weeks for pathology review with Dr Toya Rosario  Please call with any questions or concerns  Hypertension  Assessment & Plan  Continue to monitor  Continue current regimen    Compression of optic chiasm  Assessment & Plan  Status post resection of pituitary mass  See plan as above      Subjective/Objective      Chief Complaint:  "I feel a little stuffy"    Subjective:  Patient reports she feels a little stuffy around her nose  Patient denies any significant rhinorrhea  Patient denies headache, dizziness, or visual disturbance at this time  Patient denies any numbness, tingling, or weakness in bilateral arms or legs  Objective:  Alert and awake, sitting in chair, no acute distress    I/O       11/09 0701 - 11/10 0700 11/10 0701 - 11/11 0700 11/11 0701 - 11/12 0700    P  O  1685 945     I V  (mL/kg)       IV Piggyback 50      Total Intake(mL/kg) 1735 (20 2) 945 (11)     Urine (mL/kg/hr) 1800 (0 9) 2100 (1) 300 (1)    Total Output 1800 2100 300    Net -65 -1155 -300           Unmeasured Urine Occurrence  1 x           Invasive Devices     Peripheral Intravenous Line            Peripheral IV 11/07/19 Left Antecubital 4 days    Peripheral IV 11/07/19 Right Hand 3 days                Physical Exam:  Vitals: Blood pressure 142/97, pulse 70, temperature 99 7 °F (37 6 °C), resp  rate 18, height 5' 3" (1 6 m), weight 86 kg (189 lb 9 5 oz), SpO2 98 %  ,Body mass index is 33 59 kg/m²  General appearance: alert, appears stated age, cooperative and no distress  Head: Normocephalic, without obvious abnormality, atraumatic  Eyes: EOMI, PERRL  Nose: no rhinorrhea noted     Neck: supple, symmetrical, trachea midline   Lungs: non labored breathing  Heart: regular heart rate  Neurologic:   Mental status: Alert, oriented, thought content appropriate  Cranial nerves: grossly intact (Cranial nerves II-XII)  Sensory: normal to LT X 4  Motor: moving all extremities without focal weakness, strength 5/5 throughout  Reflexes: 2+ and symmetric  Coordination: finger to nose normal bilaterally, no drift bilaterally      Lab Results:  Results from last 7 days   Lab Units 11/09/19  0427 11/08/19  0433   WBC Thousand/uL 13 92* 15 04*   HEMOGLOBIN g/dL 12 4 12 2   HEMATOCRIT % 39 7 38 7   PLATELETS Thousands/uL 218 234   NEUTROS PCT % 71  --    MONOS PCT % 7  --      Results from last 7 days   Lab Units 11/10/19  0503 11/09/19  0427 11/08/19  2026   POTASSIUM mmol/L 3 9 4 0 3 4*   CHLORIDE mmol/L 112* 114* 111*   CO2 mmol/L 28 27 24   BUN mg/dL 16 11 11   CREATININE mg/dL 0 98 0 96 1 08   CALCIUM mg/dL 9 0 9 0 9 1     Results from last 7 days   Lab Units 11/08/19  0433   MAGNESIUM mg/dL 2 4     Results from last 7 days   Lab Units 11/08/19  0433   PHOSPHORUS mg/dL 2 5*         Imaging Studies: I have personally reviewed pertinent reports and I have personally reviewed pertinent films in PACS    Ct Head Wo Contrast    Result Date: 11/8/2019  Impression: Status post interval transsphenoidal resection of a previously identified pituitary adenoma with postsurgical changes as described above  No large postoperative intracranial hematoma identified  Workstation performed: [id number]       EKG, Pathology, and Other Studies: I have personally reviewed pertinent reports  VTE Pharmacologic Prophylaxis: Heparin    VTE Mechanical Prophylaxis: sequential compression device    PLEASE NOTE:  This encounter may have been completed utilizing the [employer] Direct Speech Voice Recognition Software  Grammatical errors, random word insertions, pronoun errors and incomplete sentences are occasional consequences of the system due to software limitations, ambient noise and hardware issues  These may be missed by proof reading prior to affixing electronic signature   Any questions or concerns about the content, text or information contained within the body of this dictation should be directly addressed to the advanced practitioner or physician for clarification  Please do not hesitate to call me directly if you have any questions or concerns

## 2019-11-11 NOTE — ASSESSMENT & PLAN NOTE
Neuro:    POD 4 s/p Image guided endoscopic transnasal transsphenoidal resection of pituitary mass, abdominal fat graft  Had intraop leak that was sealed  No Lumbar drain in place  Improved nasal drainage since removal of nasal Dowell  No rhinorrhea on exam    · Continue regular neurologic checks  · Imaging reviewed personally and by attending  Final results as below  · CT head wo 11/8/19: Expected post op changes s/p pituitary mass resection, no significant/large hemorrhage noted post op  · Pain control - only using Tylenol    ENT: No use of straws, no sneezing or blowing the nose- discussed with pt  Nasal Dowell removed over the weekend by ENT  Monitor drainage from nose: please notify neurosurgery of any significant rhinorrhea  Pulm: no incentive spirometry use  Recommend deep breathing and cough  :  Strict monitoring of Inputs and outputs  GI: Pt had not had BM since surgery , will consider giving pt Mag Citrate 1 dose to take at home if no BM  Pt has been taking Senna and Colace for bowel regimen  Heme:   WBC 11/8: 15 04 -> 11/10 13 92 to, likely reactive  Hgb stable at 12 4  BMP 11/10: Na stable at 146, continue to monitor  K wnl at 3 9  Continue to monitor  Endo:   · Continue to monitor DI  Urine concentrated and bright yellow  · Na elevated at 146  ID:  · Continues on IV antibiotics  Likely d/c as no significant rhinorrhea and nasal dowell removed  FEN:  · Fluids: Pt tolerating oral fluid intake  Encouraged to drink to thirst  · Nutrition: Pt tolerating oral intake  MSK/SKIN:   · Eval and mobilize per PT/OT - recommend home with family support  · DVT PPX: SCDs  HSQ     Pt is neurologically and medically stable for discharge  Pt has follow-up scheduled with neurosurgery in 2 weeks for pathology review with Dr Jes Iglesias  Please call with any questions or concerns

## 2019-11-11 NOTE — DISCHARGE SUMMARY
Discharge Summary - Neuro surgery   Rodrigo Alcantara 49 y o  female MRN: [id number]  Unit/Bed#: [facility] 379-73 Encounter: [id number]    Admission Date:   Admission Orders (From admission, onward)     Ordered        11/07/19 1610  Inpatient Admission  Once                      Discharge Date:  11/11/2019  Admitting Diagnosis: 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]  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]    Discharge Diagnosis:    S/p Image guided endoscopic transnasal transsphenoidal resection of pituitary mass, abdominal fat graft  Resolved Problems  Date Reviewed: 11/7/2019    None          Attending: Dr Misha Kenney  Consulting Physician(s): Dr Paula Ríos of ENT  Procedures Performed: Image Guided Endoscopic Transnasal exposure for transphenoidal surgery and  Image guided endoscopic transnasal transsphenoidal resection of pituitary mass, abdominal fat graft    Pathology: Pituitary mass sent to pathology, final results pending  Hospital Course: Ms Oh Samuel is a is a pleasant 50 yo female who was referred to neurosurgery for a recurrent pituitary adenoma  Pt had a hx of previous transphenoidal surgery in 2004 with Dr Aida Burns  MRI scan pituitary done 5/1/19 showed considerable mass within the sella, 2 6 cm x 1 9 cm x 1 5 cm abutting and displacing the optic chiasm  Ophthalmology results from [address] [place] [address]  From 9/26/19 showed no bitemporal field loss, although some superior nasal field loss in the left eye more so than inferior, and some circumferential peripheral loss in the right eye more so than the left  Due to the size of the lesion and concern for progression/effects on vision, pt was deemed an appropriate candidate for surgery for resection of the pituitary lesion  Pt was admitted on 11/7/19 for surgery    Patient underwent Image Guided Endoscopic Transnasal exposure for transphenoidal surgery and  Image guided endoscopic transnasal transsphenoidal resection of pituitary mass, abdominal fat graft under general anesthesia with minimal blood loss and no complications  Pt had a dowell catheter left in the nose that was to remain 48 hours post op  Patient was kept in the PACU until stable and then moved to the ICU unit for close monitoring  POD 1 pt had some mild rhinorrhea  Pt denied HA,dizziness or visual disturbance  Na was at 148 and pt was voiding independently  Pt was advised not to drink with straws, no sneezing or blowing nose  Pt had the dowell removed from nose POD 3 and by then the rhinorrhea had significantly decreased  Pt remained neurologically stable POD 1 - POD 4  There was no diabetes Insipidus noted  Patient received imaging CT head postoperatively which revealed expected post op changes s/p pituitary mass resection, no significant/large hemorrhage noted post op  PT and OT were consulted and recommend d/c to home with family support  Patient was cleared medically for discharge and was neurologically stable for discharge  Prior to discharge, postoperative instructions were discussed with patient  During that time, all questions and concerns were addressed  Patient will follow up outpatient in 2 weeks with Dr Nehal Fiore to review pathology results with BMP to be done prior to the visit  Condition at Discharge: stable     Discharge instructions/Information to patient and family:   See after visit summary for information provided to patient and family  Provisions for Follow-Up Care:  See after visit summary for information related to follow-up care and any pertinent home health orders  Disposition: Home      Planned Readmission: No    Discharge Statement   I spent 25 minutes discharging the patient  This time was spent on the day of discharge  I had direct contact with the patient on the day of discharge  Additional documentation is required if more than 30 minutes were spent on discharge       Discharge Medications:  See after visit summary for reconciled discharge medications provided to patient and family

## 2019-11-11 NOTE — SOCIAL WORK
Pt discussed during care coordination rounds & is medically cleared for discharge & meds scripts were sent to [facility]  Per [facility], pt's insurance terminated & cost of meds will be around $32  Informed pt whom will have family come with copay amt at 1330 today  Informed [facility] & they will deliver at that time  Asked to contact them when family arrives  Updated RN

## 2019-11-11 NOTE — PLAN OF CARE
Problem: Potential for Falls  Goal: Patient will remain free of falls  Description  INTERVENTIONS:  - Assess patient frequently for physical needs  -  Identify cognitive and physical deficits and behaviors that affect risk of falls  -  [place] fall precautions as indicated by assessment   - Educate patient/family on patient safety including physical limitations  - Instruct patient to call for assistance with activity based on assessment  - Modify environment to reduce risk of injury  - Consider OT/PT consult to assist with strengthening/mobility  Outcome: Progressing     Problem: NEUROSENSORY - ADULT  Goal: Achieves stable or improved neurological status  Description  INTERVENTIONS  - Monitor and report changes in neurological status  - Monitor vital signs such as temperature, blood pressure, glucose, and any other labs ordered   - Initiate measures to prevent increased intracranial pressure  - Monitor for seizure activity and implement precautions if appropriate      Outcome: Progressing  Goal: Remains free of injury related to seizures activity  Description  INTERVENTIONS  - Maintain airway, patient safety  and administer oxygen as ordered  - Monitor patient for seizure activity, document and report duration and description of seizure to physician/advanced practitioner  - If seizure occurs,  ensure patient safety during seizure  - Reorient patient post seizure  - Seizure pads on all 4 side rails  - Instruct patient/family to notify RN of any seizure activity including if an aura is experienced  - Instruct patient/family to call for assistance with activity based on nursing assessment  - Administer anti-seizure medications if ordered    Outcome: Progressing  Goal: Achieves maximal functionality and self care  Description  INTERVENTIONS  - Monitor swallowing and airway patency with patient fatigue and changes in neurological status  - Encourage and assist patient to increase activity and self care     - Encourage visually impaired, hearing impaired and aphasic patients to use assistive/communication devices  Outcome: Progressing     Problem: CARDIOVASCULAR - ADULT  Goal: Maintains optimal cardiac output and hemodynamic stability  Description  INTERVENTIONS:  - Monitor I/O, vital signs and rhythm  - Monitor for S/S and trends of decreased cardiac output  - Administer and titrate ordered vasoactive medications to optimize hemodynamic stability  - Assess quality of pulses, skin color and temperature  - Assess for signs of decreased coronary artery perfusion  - Instruct patient to report change in severity of symptoms  Outcome: Progressing  Goal: Absence of cardiac dysrhythmias or at baseline rhythm  Description  INTERVENTIONS:  - Continuous cardiac monitoring, vital signs, obtain 12 lead EKG if ordered  - Administer antiarrhythmic and heart rate control medications as ordered  - Monitor electrolytes and administer replacement therapy as ordered  Outcome: Progressing     Problem: RESPIRATORY - ADULT  Goal: Achieves optimal ventilation and oxygenation  Description  INTERVENTIONS:  - Assess for changes in respiratory status  - Assess for changes in mentation and behavior  - Position to facilitate oxygenation and minimize respiratory effort  - Oxygen administered by appropriate delivery if ordered  - Initiate smoking cessation education as indicated  - Encourage broncho-pulmonary hygiene including cough, deep breathe, Incentive Spirometry  - Assess the need for suctioning and aspirate as needed  - Assess and instruct to report SOB or any respiratory difficulty  - Respiratory Therapy support as indicated  Outcome: Progressing     Problem: GASTROINTESTINAL - ADULT  Goal: Maintains or returns to baseline bowel function  Description  INTERVENTIONS:  - Assess bowel function  - Encourage oral fluids to ensure adequate hydration  - Administer IV fluids if ordered to ensure adequate hydration  - Administer ordered medications as needed  - Encourage mobilization and activity  - Consider nutritional services referral to assist patient with adequate nutrition and appropriate food choices  Outcome: Progressing     Problem: GENITOURINARY - ADULT  Goal: Maintains or returns to baseline urinary function  Description  INTERVENTIONS:  - Assess urinary function  - Encourage oral fluids to ensure adequate hydration if ordered  - Administer IV fluids as ordered to ensure adequate hydration  - Administer ordered medications as needed  - Offer frequent toileting  - Follow urinary retention protocol if ordered  Outcome: Progressing     Problem: METABOLIC, FLUID AND ELECTROLYTES - ADULT  Goal: Electrolytes maintained within normal limits  Description  INTERVENTIONS:  - Monitor labs and assess patient for signs and symptoms of electrolyte imbalances  - Administer electrolyte replacement as ordered  - Monitor response to electrolyte replacements, including repeat lab results as appropriate  - Instruct patient on fluid and nutrition as appropriate  Outcome: Progressing  Goal: Fluid balance maintained  Description  INTERVENTIONS:  - Monitor labs   - Monitor I/O and WT  - Instruct patient on fluid and nutrition as appropriate  - Assess for signs & symptoms of volume excess or deficit  Outcome: Progressing  Goal: Glucose maintained within target range  Description  INTERVENTIONS:  - Monitor Blood Glucose as ordered  - Assess for signs and symptoms of hyperglycemia and hypoglycemia  - Administer ordered medications to maintain glucose within target range  - Assess nutritional intake and initiate nutrition service referral as needed  Outcome: Progressing     Problem: SKIN/TISSUE INTEGRITY - ADULT  Goal: Skin integrity remains intact  Description  INTERVENTIONS  - Identify patients at risk for skin breakdown  - Assess and monitor skin integrity  - Assess and monitor nutrition and hydration status  - Monitor labs (i e  albumin)  - Assess for incontinence   - Turn and reposition patient  - Assist with mobility/ambulation  - Relieve pressure over bony prominences  - Avoid friction and shearing  - Provide appropriate hygiene as needed including keeping skin clean and dry  - Evaluate need for skin moisturizer/barrier cream  - Collaborate with interdisciplinary team (i e  Nutrition, Rehabilitation, etc )   - Patient/family teaching  Outcome: Progressing  Goal: Incision(s), wounds(s) or drain site(s) healing without S/S of infection  Description  INTERVENTIONS  - Assess and document risk factors for skin impairment   - Assess and document dressing, incision, wound bed, drain sites and surrounding tissue  - Consider nutrition services referral as needed  - Oral mucous membranes remain intact  - Provide patient/ family education  Outcome: Progressing  Goal: Oral mucous membranes remain intact  Description  INTERVENTIONS  - Assess oral mucosa and hygiene practices  - Implement preventative oral hygiene regimen  - Implement oral medicated treatments as ordered  - Initiate Nutrition services referral as needed  Outcome: Progressing     Problem: HEMATOLOGIC - ADULT  Goal: Maintains hematologic stability  Description  INTERVENTIONS  - Assess for signs and symptoms of bleeding or hemorrhage  - Monitor labs  - Administer supportive blood products/factors as ordered and appropriate  Outcome: Progressing     Problem: MUSCULOSKELETAL - ADULT  Goal: Maintain or return mobility to safest level of function  Description  INTERVENTIONS:  - Assess patient's ability to carry out ADLs; assess patient's baseline for ADL function and identify physical deficits which impact ability to perform ADLs (bathing, care of mouth/teeth, toileting, grooming, dressing, etc )  - Assess/evaluate cause of self-care deficits   - Assess range of motion  - Assess patient's mobility  - Assess patient's need for assistive devices and provide as appropriate  - Encourage maximum independence but intervene and supervise when necessary  - Involve family in performance of ADLs  - Assess for home care needs following discharge   - Consider OT consult to assist with ADL evaluation and planning for discharge  - Provide patient education as appropriate  Outcome: Progressing  Goal: Maintain proper alignment of affected body part  Description  INTERVENTIONS:  - Support, maintain and protect limb and body alignment  - Provide patient/ family with appropriate education  Outcome: Progressing     Problem: PAIN - ADULT  Goal: Verbalizes/displays adequate comfort level or baseline comfort level  Description  Interventions:  - Encourage patient to monitor pain and request assistance  - Assess pain using appropriate pain scale  - Administer analgesics based on type and severity of pain and evaluate response  - Implement non-pharmacological measures as appropriate and evaluate response  - Consider cultural and social influences on pain and pain management  - Notify physician/advanced practitioner if interventions unsuccessful or patient reports new pain  Outcome: Progressing     Problem: INFECTION - ADULT  Goal: Absence or prevention of progression during hospitalization  Description  INTERVENTIONS:  - Assess and monitor for signs and symptoms of infection  - Monitor lab/diagnostic results  - Monitor all insertion sites, i e  indwelling lines, tubes, and drains  - Monitor endotracheal if appropriate and nasal secretions for changes in amount and color  - [place] appropriate cooling/warming therapies per order  - Administer medications as ordered  - Instruct and encourage patient and family to use good hand hygiene technique  - Identify and instruct in appropriate isolation precautions for identified infection/condition  Outcome: Progressing  Goal: Absence of fever/infection during neutropenic period  Description  INTERVENTIONS:  - Monitor WBC    Outcome: Progressing     Problem: SAFETY ADULT  Goal: Patient will remain free of falls  Description  INTERVENTIONS:  - Assess patient frequently for physical needs  -  Identify cognitive and physical deficits and behaviors that affect risk of falls    -  [place] fall precautions as indicated by assessment   - Educate patient/family on patient safety including physical limitations  - Instruct patient to call for assistance with activity based on assessment  - Modify environment to reduce risk of injury  - Consider OT/PT consult to assist with strengthening/mobility  Outcome: Progressing  Goal: Maintain or return to baseline ADL function  Description  INTERVENTIONS:  -  Assess patient's ability to carry out ADLs; assess patient's baseline for ADL function and identify physical deficits which impact ability to perform ADLs (bathing, care of mouth/teeth, toileting, grooming, dressing, etc )  - Assess/evaluate cause of self-care deficits   - Assess range of motion  - Assess patient's mobility; develop plan if impaired  - Assess patient's need for assistive devices and provide as appropriate  - Encourage maximum independence but intervene and supervise when necessary  - Involve family in performance of ADLs  - Assess for home care needs following discharge   - Consider OT consult to assist with ADL evaluation and planning for discharge  - Provide patient education as appropriate  Outcome: Progressing  Goal: Maintain or return mobility status to optimal level  Description  INTERVENTIONS:  - Assess patient's baseline mobility status (ambulation, transfers, stairs, etc )    - Identify cognitive and physical deficits and behaviors that affect mobility  - Identify mobility aids required to assist with transfers and/or ambulation (gait belt, sit-to-stand, lift, walker, cane, etc )  - [place] fall precautions as indicated by assessment  - Record patient progress and toleration of activity level on Mobility SBAR; progress patient to next Phase/Stage  - Instruct patient to call for assistance with activity based on assessment  - Consider rehabilitation consult to assist with strengthening/weightbearing, etc   Outcome: Progressing     Problem: DISCHARGE PLANNING  Goal: Discharge to home or other facility with appropriate resources  Description  INTERVENTIONS:  - Identify barriers to discharge w/patient and caregiver  - Arrange for needed discharge resources and transportation as appropriate  - Identify discharge learning needs (meds, wound care, etc )  - Arrange for interpretive services to assist at discharge as needed  - Refer to Case Management Department for coordinating discharge planning if the patient needs post-hospital services based on physician/advanced practitioner order or complex needs related to functional status, cognitive ability, or social support system  Outcome: Progressing     Problem: Knowledge Deficit  Goal: Patient/family/caregiver demonstrates understanding of disease process, treatment plan, medications, and discharge instructions  Description  Complete learning assessment and assess knowledge base  Interventions:  - Provide teaching at level of understanding  - Provide teaching via preferred learning methods  Outcome: Progressing     Problem: Nutrition/Hydration-ADULT  Goal: Nutrient/Hydration intake appropriate for improving, restoring or maintaining nutritional needs  Description  Monitor and assess patient's nutrition/hydration status for malnutrition  Collaborate with interdisciplinary team and initiate plan and interventions as ordered  Monitor patient's weight and dietary intake as ordered or per policy  Utilize nutrition screening tool and intervene as necessary  Determine patient's food preferences and provide high-protein, high-caloric foods as appropriate       INTERVENTIONS:  - Monitor oral intake, urinary output, labs, and treatment plans  - Assess nutrition and hydration status and recommend course of action  - Evaluate amount of meals eaten  - Assist patient with eating if necessary   - Allow adequate time for meals  - Recommend/ encourage appropriate diets, oral nutritional supplements, and vitamin/mineral supplements  - Order, calculate, and assess calorie counts as needed  - Recommend, monitor, and adjust tube feedings and TPN/PPN based on assessed needs  - Assess need for intravenous fluids  - Provide specific nutrition/hydration education as appropriate  - Include patient/family/caregiver in decisions related to nutrition  Outcome: Progressing

## 2019-11-12 ENCOUNTER — TELEPHONE (OUTPATIENT)
Dept: NEUROSURGERY | Facility: CLINIC | Age: 54
End: 2019-11-12

## 2019-11-12 NOTE — TELEPHONE ENCOUNTER
11/12/2019-SHANE (11/11/2019) D/C'ED TO HOME TODAY  2 WEEK POV FOR PATH REVIEW WITH DR ISAAC      PT DISCHARGED TO HOME   11/22/2019-2 WK POV PATH W/DR ISAAC

## 2019-11-13 LAB
LEFT EYE DIABETIC RETINOPATHY: NORMAL
RIGHT EYE DIABETIC RETINOPATHY: NORMAL

## 2019-11-13 NOTE — TELEPHONE ENCOUNTER
1st attempt - Called Uziel Seth on primary contact number after surgery 11/7/2019 with hospital discharge date of 11/11/2019 to check in on recovery and provide post surgical instructions  Got voicemail, left message to callback  Will make another attempt if no callback is received

## 2019-11-15 NOTE — TELEPHONE ENCOUNTER
Called Yamileth Cordova to follow up on patient  Patient reports that she is doing very well overall and denies any incisional issues or fevers  Patient denies any bleeding, dizziness, fever, redness, significant pain and swelling  Just reports a mild headache here and there  Verified date/time/location of her upcoming POV on 11/22/2019 and advised her to call the office with any further questions or concerns, or if any incisional issues or fevers would arise  Patient received and does understand the discharge instructions  Reviewed incision care with Patient and she has no further questions at this time  Patient was appreciative for the call

## 2019-11-18 NOTE — DISCHARGE SUMMARY
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

## 2019-11-19 ENCOUNTER — OFFICE VISIT (OUTPATIENT)
Dept: FAMILY MEDICINE CLINIC | Facility: CLINIC | Age: 54
End: 2019-11-19
Payer: COMMERCIAL

## 2019-11-19 ENCOUNTER — APPOINTMENT (OUTPATIENT)
Dept: LAB | Facility: CLINIC | Age: 54
End: 2019-11-19

## 2019-11-19 ENCOUNTER — TRANSCRIBE ORDERS (OUTPATIENT)
Dept: LAB | Facility: CLINIC | Age: 54
End: 2019-11-19

## 2019-11-19 VITALS
TEMPERATURE: 99.5 F | WEIGHT: 191 LBS | SYSTOLIC BLOOD PRESSURE: 118 MMHG | HEIGHT: 63 IN | BODY MASS INDEX: 33.84 KG/M2 | DIASTOLIC BLOOD PRESSURE: 84 MMHG

## 2019-11-19 DIAGNOSIS — H47.49 COMPRESSION OF OPTIC CHIASM: ICD-10-CM

## 2019-11-19 DIAGNOSIS — E89.3 STATUS POST TRANSSPHENOIDAL PITUITARY RESECTION (HCC): Primary | ICD-10-CM

## 2019-11-19 DIAGNOSIS — D35.2 PITUITARY MACROADENOMA (HCC): ICD-10-CM

## 2019-11-19 LAB
ANION GAP SERPL CALCULATED.3IONS-SCNC: 11 MMOL/L (ref 4–13)
BUN SERPL-MCNC: 15 MG/DL (ref 5–25)
CALCIUM SERPL-MCNC: 9.9 MG/DL (ref 8.3–10.1)
CHLORIDE SERPL-SCNC: 109 MMOL/L (ref 100–108)
CO2 SERPL-SCNC: 24 MMOL/L (ref 21–32)
CREAT SERPL-MCNC: 1.13 MG/DL (ref 0.6–1.3)
GFR SERPL CREATININE-BSD FRML MDRD: 64 ML/MIN/1.73SQ M
GLUCOSE P FAST SERPL-MCNC: 95 MG/DL (ref 65–99)
POTASSIUM SERPL-SCNC: 4.1 MMOL/L (ref 3.5–5.3)
SODIUM SERPL-SCNC: 144 MMOL/L (ref 136–145)

## 2019-11-19 PROCEDURE — 36415 COLL VENOUS BLD VENIPUNCTURE: CPT

## 2019-11-19 PROCEDURE — 80048 BASIC METABOLIC PNL TOTAL CA: CPT

## 2019-11-19 PROCEDURE — 99495 TRANSJ CARE MGMT MOD F2F 14D: CPT | Performed by: FAMILY MEDICINE

## 2019-11-19 NOTE — PROGRESS NOTES
Assessment/Plan:    No problem-specific Assessment & Plan notes found for this encounter  Diagnoses and all orders for this visit:    Status post transsphenoidal pituitary resection ([facility] [address] )  Comments:  pt is doing very well    Compression of optic chiasm  Comments:  resolved          PHQ-9 Depression Screening    PHQ-9:    Frequency of the following problems over the past two weeks:       Little interest or pleasure in doing things:  0 - not at all  Feeling down, depressed, or hopeless:  0 - not at all  PHQ-2 Score:  0          TCM Call (since 10/19/2019)     Date and time call was made  11/11/2019  3:45 [address] [facility] reviewed  Records reviewed    Patient was hospitialized at  [facility]    Date of Admission  11/07/19    Date of discharge  11/11/19    Diagnosis  Status post transspgenodialal    Disposition  Home    Were the patients medications reviewed and updated  Yes    Current Symptoms  None      TCM Call (since 10/19/2019)     Post hospital issues  None    Did you obtain your prescribed medications  Yes    Do you need help managing your prescriptions or medications  No    I have advised the patient to call PCP with any new or worsening symptoms  AS        Subjective:      Patient ID: Janay Sy is a 49 y o  female  Hospital follow up for removal of a pituitary [facility] [facility] was unaffected and she feels well, pt is seeing the surgeon on Friday      The following portions of the patient's history were reviewed and updated as appropriate: allergies, current medications, past family history, past medical history, past social history, past surgical history and problem list     Review of Systems   Constitutional: Negative for chills and fever  Eyes: Negative for visual disturbance  Respiratory: Negative for shortness of breath and wheezing  Cardiovascular: Negative for chest pain and palpitations           Objective:    /84   Temp 99 5 °F (37 5 °C)   Ht 5' 3" (1 6 m)   Wt 86 6 kg (191 lb)   BMI 33 83 kg/m²      Physical Exam   Constitutional: She is oriented to person, place, and time  She appears well-developed and well-nourished  HENT:   Head: Normocephalic and atraumatic  Right Ear: External ear normal    Left Ear: External ear normal    Nose: Nose normal    Mouth/Throat: Oropharynx is clear and moist    Eyes: Pupils are equal, round, and reactive to light  Conjunctivae and EOM are normal    Neck: Normal range of motion  Neck supple  Cardiovascular: Normal rate, regular rhythm and normal heart sounds  No murmur heard  Pulmonary/Chest: Effort normal and breath sounds normal  No respiratory distress  She has no wheezes  She has no rales  Abdominal: Soft  Bowel sounds are normal  She exhibits no distension and no mass  There is no tenderness  There is no rebound and no guarding  Musculoskeletal: Normal range of motion  She exhibits no edema  Lymphadenopathy:     She has no cervical adenopathy  Neurological: She is alert and oriented to person, place, and time  She has normal reflexes  She exhibits normal muscle tone  Skin: Skin is warm and dry  Psychiatric: She has a normal mood and affect  Her behavior is normal  Judgment and thought content normal    Nursing note and vitals reviewed

## 2019-11-22 ENCOUNTER — OFFICE VISIT (OUTPATIENT)
Dept: NEUROSURGERY | Facility: CLINIC | Age: 54
End: 2019-11-22

## 2019-11-22 VITALS
TEMPERATURE: 98.3 F | HEART RATE: 77 BPM | WEIGHT: 188 LBS | HEIGHT: 63 IN | BODY MASS INDEX: 33.31 KG/M2 | RESPIRATION RATE: 18 BRPM | SYSTOLIC BLOOD PRESSURE: 150 MMHG | DIASTOLIC BLOOD PRESSURE: 105 MMHG

## 2019-11-22 DIAGNOSIS — H47.49 COMPRESSION OF OPTIC CHIASM: ICD-10-CM

## 2019-11-22 DIAGNOSIS — E89.3 STATUS POST TRANSSPHENOIDAL PITUITARY RESECTION (HCC): ICD-10-CM

## 2019-11-22 DIAGNOSIS — D49.7 PITUITARY TUMOR: ICD-10-CM

## 2019-11-22 DIAGNOSIS — Z98.890 POST-OPERATIVE STATE: ICD-10-CM

## 2019-11-22 DIAGNOSIS — D35.2 PITUITARY MACROADENOMA (HCC): Primary | ICD-10-CM

## 2019-11-22 PROCEDURE — 99024 POSTOP FOLLOW-UP VISIT: CPT | Performed by: NEUROLOGICAL SURGERY

## 2019-11-22 NOTE — PROGRESS NOTES
Neurosurgery Office Note  Radha Kidd 49 y o  female MRN: [id number]      Assessment/Plan      Diagnoses and all orders for this visit:    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[address] )  -     MRI brain pituitary wo and w contrast; Future    Pituitary tumor  -     MRI brain pituitary wo and w contrast; Future    Post-operative state  -     MRI brain pituitary wo and w contrast; Future    Status post transsphenoidal pituitary resection (HCC)    Compression of optic chiasm          Discussion:    31-year-old woman  2 weeks status post trans nasal, transsphenoidal resection of her recurrent pituitary mass with Dr Randa Corbett  She mentions some mild nasal drainage, not persistent or copious  She has some occasional low-grade headaches in the morning but not profound  Has some left jaw pain  She does not have follow-up with Dr Randa Corbett, we called the office and she will see him on 11/25  I reviewed with the patient her pathology, that she has [address] rope type pituitary adenoma  I feel she will be ready to return to work on  12/ 9/19  We will see her back in 6 weeks postop to assess her progress  She can begin to increase her lifting, etc       We will see her back in 3 months postop, with a new MRI scan of pituitary that was service her baseline for any residual   I have ordered that study  CHIEF COMPLAINT    Chief Complaint   Patient presents with    Post-op     2 week POV with Path Review       HISTORY    History of Present Illness     49y o  year old female     HPI    See Discussion    REVIEW OF SYSTEMS    Review of Systems   Constitutional: Negative  HENT:        Left sided jaw discomfort with eating  Nasal drainage with some blood in it post surgery   Eyes: Negative  Respiratory: Negative  Cardiovascular: Negative  Gastrointestinal: Positive for constipation  Endocrine: Negative  Genitourinary: Negative  Musculoskeletal: Negative      Skin: Positive for wound (surgical incisions healing, transnasal and abdomen)  Allergic/Immunologic: Negative  Neurological: Positive for dizziness and headaches (location varies, moderate in intensity, usually in the a m )  Hematological: Negative  Psychiatric/Behavioral: Negative  Meds/Allergies     Current Outpatient Medications   Medication Sig Dispense Refill    acetaminophen (TYLENOL) 325 mg tablet Take 2 tablets (650 mg total) by mouth every 6 (six) hours as needed for mild pain 30 tablet 0    atorvastatin (LIPITOR) 20 mg tablet Take 20 mg by mouth daily      docusate sodium (COLACE) 100 mg capsule Take 1 capsule (100 mg total) by mouth 2 (two) times a day (Patient taking differently: Take 100 mg by mouth as needed ) 10 capsule 0    hydrochlorothiazide (HYDRODIURIL) 12 5 mg tablet Take 12 5 mg by mouth daily      meclizine (ANTIVERT) 12 5 MG tablet Take 2 tablets (25 mg total) by mouth 3 (three) times a day as needed for dizziness 30 tablet 0    metoprolol succinate (TOPROL-XL) 100 mg 24 hr tablet 100 mg daily       senna (SENOKOT) 8 6 mg Take 2 tablets (17 2 mg total) by mouth daily at bedtime as needed for constipation 30 each 0     No current facility-administered medications for this visit          No Known Allergies    PAST HISTORY    Past Medical History:   Diagnosis Date    GERD (gastroesophageal reflux disease)     Hypercholesteremia     Hypertension     Pituitary tumor        Past Surgical History:   Procedure Laterality Date     SECTION      [place] NEUROENDOSCOP,EXC,PIT ESPERANZA,TRANSNAS/SPHEN N/A 2019    Procedure: Image guided endoscopic transnasal transsphenoidal resection of pituitary mass, abdominal fat graft, possible lumbar drain;  Surgeon: Jonatan Pulliam MD;  [facility];  Service: Neurosurgery    TRANSPHENOIDAL / TRANSNASAL HYPOPHYSECTOMY / RESECTION PITUITARY TUMOR         Social History     Tobacco Use    Smoking status: Never Smoker    Smokeless tobacco: Never Used Substance Use Topics    Alcohol use: Not Currently     Frequency: Monthly or less    Drug use: Not Currently       Family History   Problem Relation Age of Onset    Cancer Mother     Heart disease Father     Hypertension Father     No Known Problems Daughter     No Known Problems Maternal Aunt     Leukemia Brother          Above history personally reviewed  EXAM    Vitals:Blood pressure (!) 150/105, pulse 77, temperature 98 3 °F (36 8 °C), temperature source Tympanic, resp  rate 18, height 5' 3" (1 6 m), weight 85 3 kg (188 lb)  ,Body mass index is 33 3 kg/m²  Physical Exam     Abdominal dressing removed, Monocryl sutures removed  Healing well    Neurologic Exam      MEDICAL DECISION MAKING     pathology report:     Final Diagnosis   A  and B  Brain, Pituitary Mass, Biopsy:  - Pituitary adenoma, gonadotroph type  See Note  - Ki67 labeling index is <1%

## 2019-12-06 ENCOUNTER — TELEPHONE (OUTPATIENT)
Dept: NEUROSURGERY | Facility: CLINIC | Age: 54
End: 2019-12-06

## 2019-12-20 ENCOUNTER — OFFICE VISIT (OUTPATIENT)
Dept: NEUROSURGERY | Facility: CLINIC | Age: 54
End: 2019-12-20

## 2019-12-20 VITALS
WEIGHT: 194 LBS | BODY MASS INDEX: 34.38 KG/M2 | HEART RATE: 63 BPM | TEMPERATURE: 99 F | HEIGHT: 63 IN | DIASTOLIC BLOOD PRESSURE: 100 MMHG | SYSTOLIC BLOOD PRESSURE: 140 MMHG

## 2019-12-20 DIAGNOSIS — D35.2 PITUITARY MACROADENOMA (HCC): Primary | ICD-10-CM

## 2019-12-20 PROCEDURE — 99024 POSTOP FOLLOW-UP VISIT: CPT | Performed by: NEUROLOGICAL SURGERY

## 2019-12-20 NOTE — PROGRESS NOTES
Neurosurgery Office Note  Finn Bird 49 y o  female MRN: [id number]      Assessment/Plan     Pituitary macroadenoma [facility])  S/p image guided endoscopic transnasal transphenoidal resection of pituitary mass and abdominal fat graft on 11/7/19 with Dr Mary Monaco and Dr Ramón Sheriff  · Final pathology: pituitary macroadenoma, gonadotrophic type secreting steroidogenic factor-1 (expert opinion from [facility] 12/11/19)  · Patient doing well today with no complaints of HA or nasal drainage  · Patient plans to return to work next week    Imaging:  · No imaging to review today    Plan:  · Return in 6 weeks for 3 month post op visit with Dr Mary Monaco  · Obtain MRI brain w/wo prior to appointment; this is already scheduled       Diagnoses and all orders for this visit:    Pituitary macroadenoma [facility])            CHIEF COMPLAINT    Chief Complaint   Patient presents with    Post-op     Resection of pituitary mass        HISTORY      This is a 71-year-old female who is here today for a 6 week clinical checkup  She is status post image guided endoscopic transnasal transsphenoidal resection of pituitary mass with abdominal fat grafting on 11/07/2019  This was done by Dr Mary Monaco and Dr Ramón Sheriff  She did very well postop  Currently, she denies any headache or nasal drainage  This was initially found after the patient complained of headaches and dizziness  The symptoms have since resolved  Specimen was sent to [facility] for expert opinion in returned as a pituitary adenoma of a gonadotropic type secreting steroidogenic factor 1  She currently does not follow with Endocrinology  Pituitary lab panel done in May 2019 was WNL  She has no visual complaints and no visual field issues on exam     She is scheduled for a repeat MRI of the brain in 6 weeks with immediate follow-up with Dr Solis after this study  We will see her at that time    In the meantime, she is cleared to return to work next week   She has also cleared to begin increasing her activity level and returning to normal     REVIEW OF SYSTEMS    Review of Systems   Constitutional: Negative  HENT: Negative  Eyes: Negative  Respiratory: Negative  Cardiovascular: Negative  Gastrointestinal: Negative  Endocrine: Negative  Genitourinary: Negative  Musculoskeletal: Negative  Skin: Negative  Allergic/Immunologic: Negative  Neurological: Negative  Hematological: Negative  Psychiatric/Behavioral: Negative  ROS was personally reviewed and changes made as needed     Meds/Allergies     Current Outpatient Medications   Medication Sig Dispense Refill    acetaminophen (TYLENOL) 325 mg tablet Take 2 tablets (650 mg total) by mouth every 6 (six) hours as needed for mild pain 30 tablet 0    atorvastatin (LIPITOR) 20 mg tablet Take 20 mg by mouth daily      docusate sodium (COLACE) 100 mg capsule Take 1 capsule (100 mg total) by mouth 2 (two) times a day (Patient taking differently: Take 100 mg by mouth as needed ) 10 capsule 0    hydrochlorothiazide (HYDRODIURIL) 12 5 mg tablet Take 12 5 mg by mouth daily      meclizine (ANTIVERT) 12 5 MG tablet Take 2 tablets (25 mg total) by mouth 3 (three) times a day as needed for dizziness 30 tablet 0    metoprolol succinate (TOPROL-XL) 100 mg 24 hr tablet 100 mg daily       senna (SENOKOT) 8 6 mg Take 2 tablets (17 2 mg total) by mouth daily at bedtime as needed for constipation 30 each 0     No current facility-administered medications for this visit          No Known Allergies    PAST HISTORY    Past Medical History:   Diagnosis Date    Compression of optic chiasm 2019    GERD (gastroesophageal reflux disease)     Hypercholesteremia     Hypertension     Pituitary tumor        Past Surgical History:   Procedure Laterality Date     SECTION      [place] NEUROENDOSCOP,EXC,PIT ESPERANZA,TRANSNAS/SPHEN N/A 2019    Procedure: Image guided endoscopic transnasal transsphenoidal resection of pituitary mass, abdominal fat graft, possible lumbar drain;  Surgeon: Itz Wood MD;  [facility];  Service: Neurosurgery    TRANSPHENOIDAL / TRANSNASAL HYPOPHYSECTOMY / RESECTION PITUITARY TUMOR         Social History     Tobacco Use    Smoking status: Never Smoker    Smokeless tobacco: Never Used   Substance Use Topics    Alcohol use: Not Currently     Frequency: Monthly or less    Drug use: Not Currently       Family History   Problem Relation Age of Onset    Cancer Mother     Heart disease Father     Hypertension Father     No Known Problems Daughter     No Known Problems Maternal Aunt     Leukemia Brother          Above history personally reviewed  EXAM    Vitals:Blood pressure 140/100, pulse 63, temperature 99 °F (37 2 °C), temperature source Temporal, height 5' 3" (1 6 m), weight 88 kg (194 lb)  ,Body mass index is 34 37 kg/m²  Physical Exam   Constitutional: She is oriented to person, place, and time  She appears well-developed and well-nourished  HENT:   Head: Normocephalic and atraumatic  Eyes: Pupils are equal, round, and reactive to light  EOM are normal    Neck: Normal range of motion  Cardiovascular: Normal rate  Pulmonary/Chest: Effort normal  No respiratory distress  Abdominal: Soft  Musculoskeletal: Normal range of motion  Neurological: She is alert and oriented to person, place, and time  She has normal strength  She has a normal Finger-Nose-Finger Test and a normal Tandem Gait Test  Gait normal    Reflex Scores:       Tricep reflexes are 2+ on the right side and 2+ on the left side  Bicep reflexes are 2+ on the right side and 2+ on the left side  Brachioradialis reflexes are 2+ on the right side and 2+ on the left side  Patellar reflexes are 2+ on the right side and 2+ on the left side  Achilles reflexes are 2+ on the right side and 2+ on the left side  Skin: Skin is warm and dry     Psychiatric: She has a normal mood and affect  Her speech is normal and behavior is normal  Judgment and thought content normal    Nursing note and vitals reviewed  Neurologic Exam     Mental Status   Oriented to person, place, and time  Recall at 5 minutes: recalls 3 of 3 objects  Follows 2 step commands  Attention: normal  Concentration: normal    Speech: speech is normal   Level of consciousness: alert  Knowledge: good  Able to perform simple calculations  Able to name object  Able to repeat  Normal comprehension  Cranial Nerves   Cranial nerves II through XII intact  CN III, IV, VI   Pupils are equal, round, and reactive to light  Extraocular motions are normal      Motor Exam   Muscle bulk: normal  Overall muscle tone: normal  Right arm pronator drift: absent  Left arm pronator drift: absent    Strength   Strength 5/5 throughout  Sensory Exam   Light touch normal    Pinprick normal    DST and JPS intact bilaterally     Gait, Coordination, and Reflexes     Gait  Gait: normal    Coordination   Finger to nose coordination: normal  Tandem walking coordination: normal    Tremor   Resting tremor: absent    Reflexes   Right brachioradialis: 2+  Left brachioradialis: 2+  Right biceps: 2+  Left biceps: 2+  Right triceps: 2+  Left triceps: 2+  Right patellar: 2+  Left patellar: 2+  Right achilles: 2+  Left achilles: 2+  Right : 2+  Left : 2+  Right Jacobo: absent  Left Jacobo: absent  Right ankle clonus: absent  Left ankle clonus: absent        MEDICAL DECISION MAKING    Imaging Studies:     No results found  I have personally reviewed pertinent reports

## 2019-12-20 NOTE — ASSESSMENT & PLAN NOTE
S/p image guided endoscopic transnasal transphenoidal resection of pituitary mass and abdominal fat graft on 11/7/19 with Dr Kaylee Oconnor and Dr Leafy Barthel  · Final pathology: pituitary macroadenoma, gonadotrophic type secreting steroidogenic factor-1 (expert opinion from [facility] 12/11/19)  · Patient doing well today with no complaints of HA or nasal drainage  · Patient plans to return to work next week    Imaging:  · No imaging to review today    Plan:  · Return in 6 weeks for 3 month post op visit with Dr Kaylee Oconnor  · Obtain MRI brain w/wo prior to appointment; this is already scheduled

## 2020-01-29 ENCOUNTER — TELEPHONE (OUTPATIENT)
Dept: NEUROSURGERY | Facility: CLINIC | Age: 55
End: 2020-01-29

## 2020-02-05 ENCOUNTER — TELEPHONE (OUTPATIENT)
Dept: NEUROSURGERY | Facility: CLINIC | Age: 55
End: 2020-02-05

## 2020-02-05 NOTE — TELEPHONE ENCOUNTER
2/5/20----L/m notifying patient that we kyle have to r/s her appointment on 2/7/20 with [facility] because she canceled her Brain MRI that was scheduled on 2/3/20  Patient was instructed to call central scheduling to r/s her Brain MRI and to call our office to r/s her appt with [facility] for a later date when imaging was obtained  Office and scheduling phone was provided  2/5/20--8:38am  Spoke with patient and she stated that she r/s her Brain MRI for 2/12/20   I r/s her appt with [facility] on 2/21/20@ 1pm   Patient agreed

## 2020-02-25 ENCOUNTER — HOSPITAL ENCOUNTER (OUTPATIENT)
Dept: MRI IMAGING | Facility: HOSPITAL | Age: 55
Discharge: HOME | End: 2020-02-25
Payer: COMMERCIAL

## 2020-02-25 DIAGNOSIS — D35.2 PITUITARY MACROADENOMA (HCC): ICD-10-CM

## 2020-02-25 DIAGNOSIS — D49.7 PITUITARY TUMOR: ICD-10-CM

## 2020-02-25 DIAGNOSIS — Z98.890 POST-OPERATIVE STATE: ICD-10-CM

## 2020-02-25 PROCEDURE — A9585 GADOBUTROL INJECTION: HCPCS | Performed by: NEUROLOGICAL SURGERY

## 2020-02-25 PROCEDURE — 70553 MRI BRAIN STEM W/O & W/DYE: CPT

## 2020-02-25 RX ADMIN — GADOBUTROL 9 ML: 604.72 INJECTION INTRAVENOUS at 08:38

## 2020-03-06 ENCOUNTER — OFFICE VISIT (OUTPATIENT)
Dept: NEUROSURGERY | Facility: CLINIC | Age: 55
End: 2020-03-06
Payer: COMMERCIAL

## 2020-03-06 VITALS
DIASTOLIC BLOOD PRESSURE: 94 MMHG | TEMPERATURE: 98.2 F | WEIGHT: 192 LBS | HEIGHT: 63 IN | HEART RATE: 69 BPM | SYSTOLIC BLOOD PRESSURE: 153 MMHG | RESPIRATION RATE: 16 BRPM | BODY MASS INDEX: 34.02 KG/M2

## 2020-03-06 DIAGNOSIS — E89.3 STATUS POST TRANSSPHENOIDAL PITUITARY RESECTION (HCC): ICD-10-CM

## 2020-03-06 DIAGNOSIS — Z98.890 POST-OPERATIVE STATE: ICD-10-CM

## 2020-03-06 DIAGNOSIS — D35.2 PITUITARY MACROADENOMA (HCC): Primary | ICD-10-CM

## 2020-03-06 PROCEDURE — 1036F TOBACCO NON-USER: CPT | Performed by: NEUROLOGICAL SURGERY

## 2020-03-06 PROCEDURE — 3077F SYST BP >= 140 MM HG: CPT | Performed by: NEUROLOGICAL SURGERY

## 2020-03-06 PROCEDURE — 3080F DIAST BP >= 90 MM HG: CPT | Performed by: NEUROLOGICAL SURGERY

## 2020-03-06 PROCEDURE — 2022F DILAT RTA XM EVC RTNOPTHY: CPT | Performed by: NEUROLOGICAL SURGERY

## 2020-03-06 PROCEDURE — 3008F BODY MASS INDEX DOCD: CPT | Performed by: NEUROLOGICAL SURGERY

## 2020-03-06 PROCEDURE — 99214 OFFICE O/P EST MOD 30 MIN: CPT | Performed by: NEUROLOGICAL SURGERY

## 2020-03-06 NOTE — PROGRESS NOTES
Neurosurgery Office Note  Rafael Pulliam 48 y o  female MRN: [id number]      Assessment/Plan      Diagnoses and all orders for this visit:    Pituitary macroadenoma [facility])  -     MRI brain pituitary wo and w contrast; Future    Post-operative state    Status post transsphenoidal pituitary resection ([facility] [address] )  -     MRI brain pituitary wo and w contrast; Future          Discussion:    69-year-old woman nearly 4 months status post trans nasal, transsphenoidal resection of her recurrent pituitary mass with Dr Virginia Matos  Initially underwent resection with Dr Kg Bennett 2004  Doing well - no headaches or nasal drainage  Pathology-  gonadotroph type pituitary adenoma  Some documentation suggested this was a growth hormone secreting tumor, but there was no evidence of this clinically or in her lab values when we met her in 08/2019  Her 3 months postop MRI scan Shows excellent decompression of the chiasm  There may be some slight residual in the right lateral aspect of the sella  We will follow this going forward  I briefly touched on treating this with radiation therapy as needed  It may well involute on subsequent scans, so I did not advocate for RT at the present time  I did recommend a follow-up MRI scan of the pituitary in 3 months, which I ordered  If that is stable, we would repeat the scan in 6 months, 1 year for surgery  If that is stable perhaps yearly  Metrics:  [place] 26/30, stable compared to preop  EQ5D5L 1 000, VA S 85, , ECOG 0     CHIEF COMPLAINT    Chief Complaint   Patient presents with    Follow-up     follow up, 4 months post surgery, with new MRI       HISTORY    History of Present Illness     48y o  year old female     HPI    See Discussion    REVIEW OF SYSTEMS    Review of Systems   Constitutional: Negative  HENT: Negative  Eyes: Negative  Respiratory: Positive for shortness of breath (with exertion)  Cardiovascular: Negative  Gastrointestinal: Negative  Endocrine: Negative  Genitourinary: Negative  Musculoskeletal: Positive for neck pain (occasional)  Skin: Negative  Allergic/Immunologic: Negative  Neurological: Positive for light-headedness (with strenuous activity)  Negative for headaches  Hematological: Negative  Psychiatric/Behavioral: Negative  Meds/Allergies     Current Outpatient Medications   Medication Sig Dispense Refill    acetaminophen (TYLENOL) 325 mg tablet Take 2 tablets (650 mg total) by mouth every 6 (six) hours as needed for mild pain 30 tablet 0    atorvastatin (LIPITOR) 20 mg tablet Take 20 mg by mouth daily      docusate sodium (COLACE) 100 mg capsule Take 1 capsule (100 mg total) by mouth 2 (two) times a day (Patient taking differently: Take 100 mg by mouth as needed ) 10 capsule 0    hydrochlorothiazide (HYDRODIURIL) 12 5 mg tablet Take 12 5 mg by mouth daily      meclizine (ANTIVERT) 12 5 MG tablet Take 2 tablets (25 mg total) by mouth 3 (three) times a day as needed for dizziness 30 tablet 0    metoprolol succinate (TOPROL-XL) 100 mg 24 hr tablet 100 mg daily        No current facility-administered medications for this visit          No Known Allergies    PAST HISTORY    Past Medical History:   Diagnosis Date    Compression of optic chiasm 2019    GERD (gastroesophageal reflux disease)     Hypercholesteremia     Hypertension     Pituitary tumor        Past Surgical History:   Procedure Laterality Date     SECTION      [place] NEUROENDOSCOP,EXC,PIT ESPERANZA,TRANSNAS/SPHEN N/A 2019    Procedure: Image guided endoscopic transnasal transsphenoidal resection of pituitary mass, abdominal fat graft, possible lumbar drain;  Surgeon: Donna Church MD;  [facility];  Service: Neurosurgery    TRANSPHENOIDAL / TRANSNASAL HYPOPHYSECTOMY / RESECTION PITUITARY TUMOR         Social History     Tobacco Use    Smoking status: Never Smoker    Smokeless tobacco: Never Used   Substance Use Topics  Alcohol use: Not Currently     Frequency: Monthly or less    Drug use: Not Currently       Family History   Problem Relation Age of Onset    Cancer Mother     Heart disease Father     Hypertension Father     No Known Problems Daughter     No Known Problems Maternal Aunt     Leukemia Brother          The following portions of the patient's history were reviewed in this encounter and updated as appropriate: Past medical, surgical, family, and social history, as well as medications, allergies, and review of systems  EXAM    Vitals:Blood pressure 153/94, pulse 69, temperature 98 2 °F (36 8 °C), temperature source Tympanic, resp  rate 16, height 5' 3" (1 6 m), weight 87 1 kg (192 lb)  ,Body mass index is 34 01 kg/m²  Physical Exam   Constitutional: She is oriented to person, place, and time  She appears well-developed and well-nourished  HENT:   Head: Normocephalic  Eyes: No scleral icterus  Neck: Neck supple  Cardiovascular: Normal rate  Pulmonary/Chest: Effort normal    Abdominal: Soft  Neurological: She is alert and oriented to person, place, and time  GCS eye subscore is 4  GCS verbal subscore is 5  GCS motor subscore is 6  Skin: Skin is warm and dry  Psychiatric: She has a normal mood and affect  Her speech is normal and behavior is normal    Vitals reviewed  Neurologic Exam     Mental Status   Oriented to person, place, and time  Attention: normal    Speech: speech is normal   Level of consciousness: alert    Cranial Nerves     CN VII   Facial expression full, symmetric  Motor Exam   Muscle bulk: normal  Overall muscle tone: normal  Moves all extremities, grossly normal     Gait, Coordination, and Reflexes     Tremor   Resting tremor: absent  Intention tremor: absent  Action tremor: absent  No aids            MEDICAL DECISION MAKING    Imaging Studies:     Mri Brain Pituitary Wo And W Contrast    Result Date: 2/28/2020  Narrative: MRI BRAIN AND SELLA  WITH AND WITHOUT CONTRAST INDICATION:  D35 2: Benign neoplasm of pituitary gland D49 7: Neoplasm of unspecified behavior of endocrine glands and other parts of nervous system Z98 890: Other specified postprocedural states COMPARISON: 5/1/2019 TECHNIQUE: Brain: Axial diffusion-weighted imaging  Axial FLAIR and axial T2  Axial gradient  Axial T1 postcontrast Axial bravo postcontrast  Sella: Sagittal and coronal T1  Coronal T2  Sagittal and coronal T1 postcontrast with fat suppression  Coronal dynamic enhancement  Targeted images of the sella were performed requiring additional time at acquisition and interpretation of approximately 25% IV Contrast:  9 mL of Gadobutrol injection (SINGLE-DOSE) IMAGE QUALITY:  Diagnostic  FINDINGS: BRAIN PARENCHYMA:  There is no discrete mass, mass effect or midline shift  No abnormal white matter signal identified  Brainstem and cerebellum demonstrate normal signal  There is no intracranial hemorrhage  There is no evidence of acute infarction and  diffusion imaging is unremarkable  VENTRICLES:  Normal  SELLA AND PITUITARY GLAND:  Patient is status post transsphenoidal resection of a previously identified pituitary adenoma with evidence of nasal septal flap  There are postoperative changes noted  The pituitary stalk is deviated to the left  The optic  chiasm is inferiorly displaced  There is a small amount of enhancing soft tissue along the right lateral aspect of the sella measuring 1 1 x 0 6 x 1 0 cm in the craniocaudad by transverse by anteroposterior dimensions  Cavernous sinuses are otherwise symmetric in appearance  ORBITS:  Normal  PARANASAL SINUSES:  There is paranasal sinus mucosal thickening  There is no MR evidence for a CSF leak  VASCULATURE:  Evaluation of the major intracranial vasculature demonstrates appropriate flow voids   CALVARIUM AND SKULL BASE:  Normal  EXTRACRANIAL SOFT TISSUES:  Normal      Impression: Status post interval transsphenoidal resection of the previously seen pituitary adenoma with post surgical changes as described above  No MR evidence for CSF leak  Enhancing soft tissue within the right lateral aspect of the sella, as described above and may be on the basis of residual disease versus postoperative granulation tissue  Continue interval surveillance is recommended  Workstation performed: [id number]       I have personally reviewed pertinent reports     and I have personally reviewed pertinent films in PACS

## 2020-05-14 DIAGNOSIS — I10 ESSENTIAL HYPERTENSION: Primary | Chronic | ICD-10-CM

## 2020-05-15 RX ORDER — METOPROLOL SUCCINATE 100 MG/1
100 TABLET, EXTENDED RELEASE ORAL DAILY
Qty: 30 TABLET | Refills: 5 | Status: SHIPPED | OUTPATIENT
Start: 2020-05-15 | End: 2020-10-30 | Stop reason: SDUPTHER

## 2020-05-15 RX ORDER — HYDROCHLOROTHIAZIDE 12.5 MG/1
12.5 TABLET ORAL DAILY
Qty: 30 TABLET | Refills: 5 | Status: SHIPPED | OUTPATIENT
Start: 2020-05-15 | End: 2020-10-27

## 2020-06-05 ENCOUNTER — HOSPITAL ENCOUNTER (OUTPATIENT)
Dept: MRI IMAGING | Facility: HOSPITAL | Age: 55
Discharge: HOME | End: 2020-06-05
Payer: COMMERCIAL

## 2020-06-05 DIAGNOSIS — E89.3 STATUS POST TRANSSPHENOIDAL PITUITARY RESECTION (HCC): ICD-10-CM

## 2020-06-05 DIAGNOSIS — D35.2 PITUITARY MACROADENOMA (HCC): ICD-10-CM

## 2020-06-05 PROCEDURE — A9585 GADOBUTROL INJECTION: HCPCS | Performed by: NEUROLOGICAL SURGERY

## 2020-06-05 PROCEDURE — 70553 MRI BRAIN STEM W/O & W/DYE: CPT

## 2020-06-05 RX ADMIN — GADOBUTROL 9 ML: 604.72 INJECTION INTRAVENOUS at 08:23

## 2020-06-09 ENCOUNTER — OFFICE VISIT (OUTPATIENT)
Dept: FAMILY MEDICINE CLINIC | Facility: CLINIC | Age: 55
End: 2020-06-09
Payer: COMMERCIAL

## 2020-06-09 VITALS
SYSTOLIC BLOOD PRESSURE: 130 MMHG | TEMPERATURE: 98.8 F | OXYGEN SATURATION: 99 % | WEIGHT: 182.2 LBS | HEIGHT: 63 IN | HEART RATE: 60 BPM | DIASTOLIC BLOOD PRESSURE: 94 MMHG | BODY MASS INDEX: 32.28 KG/M2

## 2020-06-09 DIAGNOSIS — I10 ESSENTIAL HYPERTENSION: ICD-10-CM

## 2020-06-09 DIAGNOSIS — Z13.31 NEGATIVE DEPRESSION SCREENING: ICD-10-CM

## 2020-06-09 DIAGNOSIS — M54.50 ACUTE MIDLINE LOW BACK PAIN WITHOUT SCIATICA: ICD-10-CM

## 2020-06-09 DIAGNOSIS — E66.09 CLASS 1 OBESITY DUE TO EXCESS CALORIES WITH SERIOUS COMORBIDITY AND BODY MASS INDEX (BMI) OF 34.0 TO 34.9 IN ADULT: ICD-10-CM

## 2020-06-09 DIAGNOSIS — D49.7 PITUITARY TUMOR: Primary | ICD-10-CM

## 2020-06-09 PROCEDURE — 3008F BODY MASS INDEX DOCD: CPT | Performed by: FAMILY MEDICINE

## 2020-06-09 PROCEDURE — 3075F SYST BP GE 130 - 139MM HG: CPT | Performed by: FAMILY MEDICINE

## 2020-06-09 PROCEDURE — 99214 OFFICE O/P EST MOD 30 MIN: CPT | Performed by: FAMILY MEDICINE

## 2020-06-09 PROCEDURE — 1036F TOBACCO NON-USER: CPT | Performed by: FAMILY MEDICINE

## 2020-06-09 PROCEDURE — 3080F DIAST BP >= 90 MM HG: CPT | Performed by: FAMILY MEDICINE

## 2020-06-09 PROCEDURE — 2022F DILAT RTA XM EVC RTNOPTHY: CPT | Performed by: FAMILY MEDICINE

## 2020-06-09 RX ORDER — IBUPROFEN 800 MG/1
800 TABLET ORAL EVERY 8 HOURS PRN
Qty: 60 TABLET | Refills: 2 | Status: SHIPPED | OUTPATIENT
Start: 2020-06-09 | End: 2020-06-26 | Stop reason: ALTCHOICE

## 2020-06-09 RX ORDER — METHOCARBAMOL 500 MG/1
500 TABLET, FILM COATED ORAL 3 TIMES DAILY
Qty: 60 TABLET | Refills: 2 | Status: SHIPPED | OUTPATIENT
Start: 2020-06-09 | End: 2020-10-08 | Stop reason: ALTCHOICE

## 2020-06-09 RX ORDER — OMEPRAZOLE 40 MG/1
40 CAPSULE, DELAYED RELEASE ORAL DAILY
Status: ON HOLD | COMMUNITY
Start: 2020-05-20 | End: 2020-11-14 | Stop reason: SDUPTHER

## 2020-06-25 ENCOUNTER — TELEPHONE (OUTPATIENT)
Dept: OTHER | Facility: OTHER | Age: 55
End: 2020-06-25

## 2020-06-26 ENCOUNTER — OFFICE VISIT (OUTPATIENT)
Dept: NEUROSURGERY | Facility: CLINIC | Age: 55
End: 2020-06-26
Payer: COMMERCIAL

## 2020-06-26 VITALS
TEMPERATURE: 98 F | BODY MASS INDEX: 32.43 KG/M2 | DIASTOLIC BLOOD PRESSURE: 80 MMHG | SYSTOLIC BLOOD PRESSURE: 130 MMHG | WEIGHT: 183 LBS | HEIGHT: 63 IN

## 2020-06-26 DIAGNOSIS — D35.2 PITUITARY MACROADENOMA (HCC): Primary | ICD-10-CM

## 2020-06-26 DIAGNOSIS — Z01.818 PREPROCEDURAL EXAMINATION: ICD-10-CM

## 2020-06-26 DIAGNOSIS — E89.3 STATUS POST TRANSSPHENOIDAL PITUITARY RESECTION (HCC): ICD-10-CM

## 2020-06-26 PROCEDURE — 3075F SYST BP GE 130 - 139MM HG: CPT | Performed by: PHYSICIAN ASSISTANT

## 2020-06-26 PROCEDURE — 2022F DILAT RTA XM EVC RTNOPTHY: CPT | Performed by: PHYSICIAN ASSISTANT

## 2020-06-26 PROCEDURE — 1036F TOBACCO NON-USER: CPT | Performed by: PHYSICIAN ASSISTANT

## 2020-06-26 PROCEDURE — 99213 OFFICE O/P EST LOW 20 MIN: CPT | Performed by: PHYSICIAN ASSISTANT

## 2020-06-26 PROCEDURE — 3079F DIAST BP 80-89 MM HG: CPT | Performed by: PHYSICIAN ASSISTANT

## 2020-06-26 PROCEDURE — 3008F BODY MASS INDEX DOCD: CPT | Performed by: PHYSICIAN ASSISTANT

## 2020-08-14 ENCOUNTER — TELEPHONE (OUTPATIENT)
Dept: FAMILY MEDICINE CLINIC | Facility: CLINIC | Age: 55
End: 2020-08-14

## 2020-08-14 DIAGNOSIS — Z20.822 ENCOUNTER FOR LABORATORY TESTING FOR COVID-19 VIRUS: Primary | ICD-10-CM

## 2020-08-14 NOTE — TELEPHONE ENCOUNTER
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

## 2020-09-29 ENCOUNTER — OFFICE VISIT (OUTPATIENT)
Dept: FAMILY MEDICINE CLINIC | Facility: CLINIC | Age: 55
End: 2020-09-29
Payer: COMMERCIAL

## 2020-09-29 DIAGNOSIS — Z12.11 SCREENING FOR COLON CANCER: ICD-10-CM

## 2020-09-29 DIAGNOSIS — E66.09 CLASS 1 OBESITY DUE TO EXCESS CALORIES WITH SERIOUS COMORBIDITY AND BODY MASS INDEX (BMI) OF 32.0 TO 32.9 IN ADULT: ICD-10-CM

## 2020-09-29 DIAGNOSIS — Z13.31 NEGATIVE DEPRESSION SCREENING: ICD-10-CM

## 2020-09-29 DIAGNOSIS — I10 ESSENTIAL HYPERTENSION: ICD-10-CM

## 2020-09-29 DIAGNOSIS — Z00.00 ANNUAL PHYSICAL EXAM: Primary | ICD-10-CM

## 2020-09-29 PROCEDURE — 99214 OFFICE O/P EST MOD 30 MIN: CPT | Performed by: FAMILY MEDICINE

## 2020-10-01 ENCOUNTER — TRANSCRIBE ORDERS (OUTPATIENT)
Dept: LAB | Facility: CLINIC | Age: 55
End: 2020-10-01

## 2020-10-01 ENCOUNTER — APPOINTMENT (OUTPATIENT)
Dept: LAB | Facility: CLINIC | Age: 55
End: 2020-10-01
Payer: COMMERCIAL

## 2020-10-01 DIAGNOSIS — I10 ESSENTIAL HYPERTENSION: ICD-10-CM

## 2020-10-01 DIAGNOSIS — D35.2 PITUITARY MACROADENOMA (HCC): ICD-10-CM

## 2020-10-01 LAB
ALBUMIN SERPL BCP-MCNC: 3.8 G/DL (ref 3.5–5)
ALP SERPL-CCNC: 83 U/L (ref 46–116)
ALT SERPL W P-5'-P-CCNC: 32 U/L (ref 12–78)
ANION GAP SERPL CALCULATED.3IONS-SCNC: 5 MMOL/L (ref 4–13)
AST SERPL W P-5'-P-CCNC: 25 U/L (ref 5–45)
BASOPHILS # BLD AUTO: 0.04 THOUSANDS/ΜL (ref 0–0.1)
BASOPHILS NFR BLD AUTO: 1 % (ref 0–1)
BILIRUB SERPL-MCNC: 0.38 MG/DL (ref 0.2–1)
BUN SERPL-MCNC: 21 MG/DL (ref 5–25)
CALCIUM SERPL-MCNC: 9.5 MG/DL (ref 8.3–10.1)
CHLORIDE SERPL-SCNC: 106 MMOL/L (ref 100–108)
CHOLEST SERPL-MCNC: 262 MG/DL (ref 50–200)
CO2 SERPL-SCNC: 29 MMOL/L (ref 21–32)
CREAT SERPL-MCNC: 0.87 MG/DL (ref 0.6–1.3)
EOSINOPHIL # BLD AUTO: 0.29 THOUSAND/ΜL (ref 0–0.61)
EOSINOPHIL NFR BLD AUTO: 4 % (ref 0–6)
ERYTHROCYTE [DISTWIDTH] IN BLOOD BY AUTOMATED COUNT: 14.7 % (ref 11.6–15.1)
GFR SERPL CREATININE-BSD FRML MDRD: 87 ML/MIN/1.73SQ M
GLUCOSE P FAST SERPL-MCNC: 61 MG/DL (ref 65–99)
HCT VFR BLD AUTO: 39.7 % (ref 34.8–46.1)
HDLC SERPL-MCNC: 59 MG/DL
HGB BLD-MCNC: 12.4 G/DL (ref 11.5–15.4)
IMM GRANULOCYTES # BLD AUTO: 0.01 THOUSAND/UL (ref 0–0.2)
IMM GRANULOCYTES NFR BLD AUTO: 0 % (ref 0–2)
LDLC SERPL CALC-MCNC: 183 MG/DL (ref 0–100)
LYMPHOCYTES # BLD AUTO: 3.94 THOUSANDS/ΜL (ref 0.6–4.47)
LYMPHOCYTES NFR BLD AUTO: 56 % (ref 14–44)
MCH RBC QN AUTO: 27.6 PG (ref 26.8–34.3)
MCHC RBC AUTO-ENTMCNC: 31.2 G/DL (ref 31.4–37.4)
MCV RBC AUTO: 88 FL (ref 82–98)
MONOCYTES # BLD AUTO: 0.53 THOUSAND/ΜL (ref 0.17–1.22)
MONOCYTES NFR BLD AUTO: 8 % (ref 4–12)
NEUTROPHILS # BLD AUTO: 2.14 THOUSANDS/ΜL (ref 1.85–7.62)
NEUTS SEG NFR BLD AUTO: 31 % (ref 43–75)
NONHDLC SERPL-MCNC: 203 MG/DL
NRBC BLD AUTO-RTO: 0 /100 WBCS
PLATELET # BLD AUTO: 216 THOUSANDS/UL (ref 149–390)
PMV BLD AUTO: 12.8 FL (ref 8.9–12.7)
POTASSIUM SERPL-SCNC: 4.6 MMOL/L (ref 3.5–5.3)
PROT SERPL-MCNC: 8.2 G/DL (ref 6.4–8.2)
RBC # BLD AUTO: 4.5 MILLION/UL (ref 3.81–5.12)
SODIUM SERPL-SCNC: 140 MMOL/L (ref 136–145)
TRIGL SERPL-MCNC: 102 MG/DL
TSH SERPL DL<=0.05 MIU/L-ACNC: 1.11 UIU/ML (ref 0.36–3.74)
WBC # BLD AUTO: 6.95 THOUSAND/UL (ref 4.31–10.16)

## 2020-10-01 PROCEDURE — 85025 COMPLETE CBC W/AUTO DIFF WBC: CPT

## 2020-10-01 PROCEDURE — 36415 COLL VENOUS BLD VENIPUNCTURE: CPT

## 2020-10-01 PROCEDURE — 80053 COMPREHEN METABOLIC PANEL: CPT

## 2020-10-01 PROCEDURE — 84443 ASSAY THYROID STIM HORMONE: CPT

## 2020-10-01 PROCEDURE — 80061 LIPID PANEL: CPT

## 2020-10-08 ENCOUNTER — OFFICE VISIT (OUTPATIENT)
Dept: FAMILY MEDICINE CLINIC | Facility: CLINIC | Age: 55
End: 2020-10-08
Payer: COMMERCIAL

## 2020-10-08 VITALS
SYSTOLIC BLOOD PRESSURE: 130 MMHG | OXYGEN SATURATION: 100 % | HEART RATE: 79 BPM | TEMPERATURE: 97.3 F | HEIGHT: 63 IN | DIASTOLIC BLOOD PRESSURE: 74 MMHG | BODY MASS INDEX: 32.36 KG/M2 | WEIGHT: 182.6 LBS

## 2020-10-08 DIAGNOSIS — I10 ESSENTIAL HYPERTENSION: Primary | Chronic | ICD-10-CM

## 2020-10-08 DIAGNOSIS — E78.00 ELEVATED LDL CHOLESTEROL LEVEL: ICD-10-CM

## 2020-10-08 PROCEDURE — 1036F TOBACCO NON-USER: CPT | Performed by: NURSE PRACTITIONER

## 2020-10-08 PROCEDURE — 99213 OFFICE O/P EST LOW 20 MIN: CPT | Performed by: NURSE PRACTITIONER

## 2020-10-08 PROCEDURE — 3725F SCREEN DEPRESSION PERFORMED: CPT | Performed by: NURSE PRACTITIONER

## 2020-10-08 PROCEDURE — 3075F SYST BP GE 130 - 139MM HG: CPT | Performed by: NURSE PRACTITIONER

## 2020-10-08 PROCEDURE — 3078F DIAST BP <80 MM HG: CPT | Performed by: NURSE PRACTITIONER

## 2020-10-26 DIAGNOSIS — I10 ESSENTIAL HYPERTENSION: Chronic | ICD-10-CM

## 2020-10-27 RX ORDER — HYDROCHLOROTHIAZIDE 12.5 MG/1
TABLET ORAL
Qty: 30 TABLET | Refills: 5 | Status: SHIPPED | OUTPATIENT
Start: 2020-10-27 | End: 2021-05-18

## 2020-10-30 DIAGNOSIS — I10 ESSENTIAL HYPERTENSION: Chronic | ICD-10-CM

## 2020-10-30 RX ORDER — METOPROLOL SUCCINATE 100 MG/1
100 TABLET, EXTENDED RELEASE ORAL DAILY
Qty: 30 TABLET | Refills: 5 | Status: SHIPPED | OUTPATIENT
Start: 2020-10-30 | End: 2021-05-18

## 2020-11-13 ENCOUNTER — HOSPITAL ENCOUNTER (OUTPATIENT)
Facility: HOSPITAL | Age: 55
Setting detail: OBSERVATION
Discharge: HOME | End: 2020-11-14
Attending: EMERGENCY MEDICINE | Admitting: HOSPITALIST
Payer: COMMERCIAL

## 2020-11-13 ENCOUNTER — APPOINTMENT (EMERGENCY)
Dept: RADIOLOGY | Facility: HOSPITAL | Age: 55
End: 2020-11-13
Attending: EMERGENCY MEDICINE
Payer: COMMERCIAL

## 2020-11-13 DIAGNOSIS — K21.9 GASTROESOPHAGEAL REFLUX DISEASE WITHOUT ESOPHAGITIS: ICD-10-CM

## 2020-11-13 DIAGNOSIS — R07.89 CHEST PRESSURE: ICD-10-CM

## 2020-11-13 DIAGNOSIS — R53.1 WEAKNESS: Primary | ICD-10-CM

## 2020-11-13 PROBLEM — E66.9 CLASS 1 OBESITY IN ADULT: Status: ACTIVE | Noted: 2019-11-07

## 2020-11-13 PROBLEM — I10 HYPERTENSION: Status: ACTIVE | Noted: 2019-04-09

## 2020-11-13 PROBLEM — R11.2 INTRACTABLE VOMITING WITH NAUSEA: Status: ACTIVE | Noted: 2020-11-13

## 2020-11-13 PROBLEM — E78.2 MIXED HYPERLIPIDEMIA: Status: ACTIVE | Noted: 2020-11-13

## 2020-11-13 PROBLEM — R11.10 VOMITING: Status: ACTIVE | Noted: 2020-11-13

## 2020-11-13 LAB
ALBUMIN SERPL BCP-MCNC: 4.6 G/DL (ref 3.5–5.7)
ALP SERPL-CCNC: 66 U/L (ref 40–150)
ALT SERPL W P-5'-P-CCNC: 23 U/L (ref 7–52)
ANION GAP SERPL CALCULATED.3IONS-SCNC: 6 MMOL/L (ref 4–13)
AST SERPL W P-5'-P-CCNC: 24 U/L (ref 13–39)
ATRIAL RATE: 59 BPM
ATRIAL RATE: 60 BPM
BACTERIA UR QL AUTO: ABNORMAL /HPF
BASOPHILS # BLD AUTO: 0 THOUSANDS/ΜL (ref 0–0.1)
BASOPHILS NFR BLD AUTO: 1 % (ref 0–2)
BILIRUB SERPL-MCNC: 0.3 MG/DL (ref 0.2–1)
BILIRUB UR QL STRIP: NEGATIVE
BUN SERPL-MCNC: 20 MG/DL (ref 7–25)
CALCIUM SERPL-MCNC: 9.9 MG/DL (ref 8.6–10.5)
CHLORIDE SERPL-SCNC: 103 MMOL/L (ref 98–107)
CLARITY UR: CLEAR
CO2 SERPL-SCNC: 30 MMOL/L (ref 21–31)
COLOR UR: YELLOW
CREAT SERPL-MCNC: 0.94 MG/DL (ref 0.6–1.2)
EOSINOPHIL # BLD AUTO: 0.1 THOUSAND/ΜL (ref 0–0.61)
EOSINOPHIL NFR BLD AUTO: 1 % (ref 0–5)
ERYTHROCYTE [DISTWIDTH] IN BLOOD BY AUTOMATED COUNT: 14.3 % (ref 11.5–14.5)
FLUAV RNA RESP QL NAA+PROBE: NEGATIVE
FLUBV RNA RESP QL NAA+PROBE: NEGATIVE
GFR SERPL CREATININE-BSD FRML MDRD: 80 ML/MIN/1.73SQ M
GLUCOSE SERPL-MCNC: 108 MG/DL (ref 65–99)
GLUCOSE UR STRIP-MCNC: NEGATIVE MG/DL
HCT VFR BLD AUTO: 38.3 % (ref 42–47)
HGB BLD-MCNC: 12.5 G/DL (ref 12–16)
HGB UR QL STRIP.AUTO: ABNORMAL
KETONES UR STRIP-MCNC: NEGATIVE MG/DL
LEUKOCYTE ESTERASE UR QL STRIP: NEGATIVE
LIPASE SERPL-CCNC: <10 U/L (ref 11–82)
LYMPHOCYTES # BLD AUTO: 2.3 THOUSANDS/ΜL (ref 0.6–4.47)
LYMPHOCYTES NFR BLD AUTO: 34 % (ref 21–51)
MCH RBC QN AUTO: 28 PG (ref 26–34)
MCHC RBC AUTO-ENTMCNC: 32.5 G/DL (ref 31–37)
MCV RBC AUTO: 86 FL (ref 81–99)
MONOCYTES # BLD AUTO: 0.4 THOUSAND/ΜL (ref 0.17–1.22)
MONOCYTES NFR BLD AUTO: 6 % (ref 2–12)
NEUTROPHILS # BLD AUTO: 3.9 THOUSANDS/ΜL (ref 1.4–6.5)
NEUTS SEG NFR BLD AUTO: 58 % (ref 42–75)
NITRITE UR QL STRIP: NEGATIVE
NON-SQ EPI CELLS URNS QL MICRO: ABNORMAL /HPF
P AXIS: 53 DEGREES
P AXIS: 55 DEGREES
PH UR STRIP.AUTO: 6 [PH]
PLATELET # BLD AUTO: 217 THOUSANDS/UL (ref 149–390)
PMV BLD AUTO: 10.3 FL (ref 8.6–11.7)
POTASSIUM SERPL-SCNC: 3.7 MMOL/L (ref 3.5–5.5)
PR INTERVAL: 182 MS
PR INTERVAL: 184 MS
PROT SERPL-MCNC: 7.8 G/DL (ref 6.4–8.9)
PROT UR STRIP-MCNC: NEGATIVE MG/DL
QRS AXIS: 49 DEGREES
QRS AXIS: 49 DEGREES
QRSD INTERVAL: 92 MS
QRSD INTERVAL: 94 MS
QT INTERVAL: 420 MS
QT INTERVAL: 428 MS
QTC INTERVAL: 420 MS
QTC INTERVAL: 423 MS
RBC # BLD AUTO: 4.45 MILLION/UL (ref 3.9–5.2)
RBC #/AREA URNS AUTO: ABNORMAL /HPF
RSV RNA RESP QL NAA+PROBE: NEGATIVE
SARS-COV-2 RNA RESP QL NAA+PROBE: NEGATIVE
SODIUM SERPL-SCNC: 139 MMOL/L (ref 134–143)
SP GR UR STRIP.AUTO: 1.02 (ref 1–1.03)
T WAVE AXIS: -72 DEGREES
T WAVE AXIS: -77 DEGREES
TROPONIN I SERPL-MCNC: <0.03 NG/ML
UROBILINOGEN UR QL STRIP.AUTO: 0.2 E.U./DL
VENTRICULAR RATE: 59 BPM
VENTRICULAR RATE: 60 BPM
WBC # BLD AUTO: 6.8 THOUSAND/UL (ref 4.8–10.8)
WBC #/AREA URNS AUTO: ABNORMAL /HPF

## 2020-11-13 PROCEDURE — 99284 EMERGENCY DEPT VISIT MOD MDM: CPT | Performed by: EMERGENCY MEDICINE

## 2020-11-13 PROCEDURE — 0241U HB NFCT DS VIR RESP RNA 4 TRGT: CPT | Performed by: NURSE PRACTITIONER

## 2020-11-13 PROCEDURE — 99285 EMERGENCY DEPT VISIT HI MDM: CPT

## 2020-11-13 PROCEDURE — 81001 URINALYSIS AUTO W/SCOPE: CPT | Performed by: EMERGENCY MEDICINE

## 2020-11-13 PROCEDURE — 83690 ASSAY OF LIPASE: CPT | Performed by: EMERGENCY MEDICINE

## 2020-11-13 PROCEDURE — 84484 ASSAY OF TROPONIN QUANT: CPT | Performed by: NURSE PRACTITIONER

## 2020-11-13 PROCEDURE — 96374 THER/PROPH/DIAG INJ IV PUSH: CPT

## 2020-11-13 PROCEDURE — 93005 ELECTROCARDIOGRAM TRACING: CPT

## 2020-11-13 PROCEDURE — 99220 PR INITIAL OBSERVATION CARE/DAY 70 MINUTES: CPT | Performed by: NURSE PRACTITIONER

## 2020-11-13 PROCEDURE — 36415 COLL VENOUS BLD VENIPUNCTURE: CPT | Performed by: EMERGENCY MEDICINE

## 2020-11-13 PROCEDURE — 93010 ELECTROCARDIOGRAM REPORT: CPT | Performed by: INTERNAL MEDICINE

## 2020-11-13 PROCEDURE — 71045 X-RAY EXAM CHEST 1 VIEW: CPT

## 2020-11-13 PROCEDURE — 84484 ASSAY OF TROPONIN QUANT: CPT | Performed by: EMERGENCY MEDICINE

## 2020-11-13 PROCEDURE — 80053 COMPREHEN METABOLIC PANEL: CPT | Performed by: EMERGENCY MEDICINE

## 2020-11-13 PROCEDURE — 85025 COMPLETE CBC W/AUTO DIFF WBC: CPT | Performed by: EMERGENCY MEDICINE

## 2020-11-13 RX ORDER — METOPROLOL SUCCINATE 50 MG/1
100 TABLET, EXTENDED RELEASE ORAL DAILY
Status: DISCONTINUED | OUTPATIENT
Start: 2020-11-13 | End: 2020-11-14 | Stop reason: HOSPADM

## 2020-11-13 RX ORDER — ONDANSETRON 2 MG/ML
4 INJECTION INTRAMUSCULAR; INTRAVENOUS EVERY 6 HOURS PRN
Status: DISCONTINUED | OUTPATIENT
Start: 2020-11-13 | End: 2020-11-14 | Stop reason: HOSPADM

## 2020-11-13 RX ORDER — HYDROCHLOROTHIAZIDE 12.5 MG/1
12.5 TABLET ORAL DAILY
Status: DISCONTINUED | OUTPATIENT
Start: 2020-11-14 | End: 2020-11-14 | Stop reason: HOSPADM

## 2020-11-13 RX ORDER — SODIUM CHLORIDE 9 MG/ML
3 INJECTION INTRAVENOUS
Status: DISCONTINUED | OUTPATIENT
Start: 2020-11-13 | End: 2020-11-14 | Stop reason: HOSPADM

## 2020-11-13 RX ORDER — ACETAMINOPHEN 325 MG/1
650 TABLET ORAL EVERY 6 HOURS PRN
Status: DISCONTINUED | OUTPATIENT
Start: 2020-11-13 | End: 2020-11-14 | Stop reason: HOSPADM

## 2020-11-13 RX ORDER — PANTOPRAZOLE SODIUM 40 MG/1
40 TABLET, DELAYED RELEASE ORAL
Status: DISCONTINUED | OUTPATIENT
Start: 2020-11-14 | End: 2020-11-14 | Stop reason: HOSPADM

## 2020-11-13 RX ORDER — ASPIRIN 81 MG/1
324 TABLET, CHEWABLE ORAL ONCE
Status: COMPLETED | OUTPATIENT
Start: 2020-11-13 | End: 2020-11-13

## 2020-11-13 RX ORDER — ATORVASTATIN CALCIUM 20 MG/1
20 TABLET, FILM COATED ORAL DAILY
Status: DISCONTINUED | OUTPATIENT
Start: 2020-11-14 | End: 2020-11-14 | Stop reason: HOSPADM

## 2020-11-13 RX ORDER — ONDANSETRON 2 MG/ML
4 INJECTION INTRAMUSCULAR; INTRAVENOUS ONCE
Status: COMPLETED | OUTPATIENT
Start: 2020-11-13 | End: 2020-11-13

## 2020-11-13 RX ORDER — SODIUM CHLORIDE AND POTASSIUM CHLORIDE .9; .15 G/100ML; G/100ML
100 SOLUTION INTRAVENOUS CONTINUOUS
Status: DISCONTINUED | OUTPATIENT
Start: 2020-11-13 | End: 2020-11-14 | Stop reason: HOSPADM

## 2020-11-13 RX ADMIN — SODIUM CHLORIDE AND POTASSIUM CHLORIDE 100 ML/HR: .9; .15 SOLUTION INTRAVENOUS at 17:54

## 2020-11-13 RX ADMIN — ASPIRIN 81 MG CHEWABLE TABLET 324 MG: 81 TABLET CHEWABLE at 14:07

## 2020-11-13 RX ADMIN — METOPROLOL SUCCINATE 100 MG: 50 TABLET, EXTENDED RELEASE ORAL at 17:54

## 2020-11-13 RX ADMIN — ONDANSETRON 4 MG: 2 INJECTION INTRAMUSCULAR; INTRAVENOUS at 14:08

## 2020-11-14 ENCOUNTER — TELEPHONE (OUTPATIENT)
Dept: FAMILY MEDICINE CLINIC | Facility: CLINIC | Age: 55
End: 2020-11-14

## 2020-11-14 VITALS
RESPIRATION RATE: 18 BRPM | HEART RATE: 58 BPM | OXYGEN SATURATION: 97 % | SYSTOLIC BLOOD PRESSURE: 169 MMHG | TEMPERATURE: 97 F | DIASTOLIC BLOOD PRESSURE: 96 MMHG | BODY MASS INDEX: 31.99 KG/M2 | HEIGHT: 64 IN | WEIGHT: 187.39 LBS

## 2020-11-14 LAB
ALBUMIN SERPL BCP-MCNC: 3.9 G/DL (ref 3.5–5.7)
ALP SERPL-CCNC: 57 U/L (ref 40–150)
ALT SERPL W P-5'-P-CCNC: 21 U/L (ref 7–52)
ANION GAP SERPL CALCULATED.3IONS-SCNC: 4 MMOL/L (ref 4–13)
AST SERPL W P-5'-P-CCNC: 22 U/L (ref 13–39)
ATRIAL RATE: 60 BPM
BASOPHILS # BLD AUTO: 0 THOUSANDS/ΜL (ref 0–0.1)
BASOPHILS NFR BLD AUTO: 0 % (ref 0–2)
BILIRUB SERPL-MCNC: 0.4 MG/DL (ref 0.2–1)
BUN SERPL-MCNC: 15 MG/DL (ref 7–25)
CALCIUM SERPL-MCNC: 9.3 MG/DL (ref 8.6–10.5)
CHLORIDE SERPL-SCNC: 108 MMOL/L (ref 98–107)
CO2 SERPL-SCNC: 29 MMOL/L (ref 21–31)
CREAT SERPL-MCNC: 0.89 MG/DL (ref 0.6–1.2)
EOSINOPHIL # BLD AUTO: 0.1 THOUSAND/ΜL (ref 0–0.61)
EOSINOPHIL NFR BLD AUTO: 1 % (ref 0–5)
ERYTHROCYTE [DISTWIDTH] IN BLOOD BY AUTOMATED COUNT: 14.2 % (ref 11.5–14.5)
GFR SERPL CREATININE-BSD FRML MDRD: 85 ML/MIN/1.73SQ M
GLUCOSE SERPL-MCNC: 95 MG/DL (ref 65–99)
HCT VFR BLD AUTO: 37.7 % (ref 42–47)
HGB BLD-MCNC: 12 G/DL (ref 12–16)
LYMPHOCYTES # BLD AUTO: 2.9 THOUSANDS/ΜL (ref 0.6–4.47)
LYMPHOCYTES NFR BLD AUTO: 43 % (ref 21–51)
MAGNESIUM SERPL-MCNC: 2.3 MG/DL (ref 1.9–2.7)
MCH RBC QN AUTO: 27.5 PG (ref 26–34)
MCHC RBC AUTO-ENTMCNC: 31.9 G/DL (ref 31–37)
MCV RBC AUTO: 86 FL (ref 81–99)
MONOCYTES # BLD AUTO: 0.5 THOUSAND/ΜL (ref 0.17–1.22)
MONOCYTES NFR BLD AUTO: 8 % (ref 2–12)
NEUTROPHILS # BLD AUTO: 3.1 THOUSANDS/ΜL (ref 1.4–6.5)
NEUTS SEG NFR BLD AUTO: 47 % (ref 42–75)
P AXIS: 48 DEGREES
PLATELET # BLD AUTO: 201 THOUSANDS/UL (ref 149–390)
PMV BLD AUTO: 10.4 FL (ref 8.6–11.7)
POTASSIUM SERPL-SCNC: 4.5 MMOL/L (ref 3.5–5.5)
PR INTERVAL: 176 MS
PROT SERPL-MCNC: 6.8 G/DL (ref 6.4–8.9)
QRS AXIS: 24 DEGREES
QRSD INTERVAL: 94 MS
QT INTERVAL: 414 MS
QTC INTERVAL: 414 MS
RBC # BLD AUTO: 4.38 MILLION/UL (ref 3.9–5.2)
SODIUM SERPL-SCNC: 141 MMOL/L (ref 134–143)
T WAVE AXIS: 8 DEGREES
VENTRICULAR RATE: 60 BPM
WBC # BLD AUTO: 6.6 THOUSAND/UL (ref 4.8–10.8)

## 2020-11-14 PROCEDURE — 85025 COMPLETE CBC W/AUTO DIFF WBC: CPT | Performed by: NURSE PRACTITIONER

## 2020-11-14 PROCEDURE — 93010 ELECTROCARDIOGRAM REPORT: CPT | Performed by: INTERNAL MEDICINE

## 2020-11-14 PROCEDURE — 99217 PR OBSERVATION CARE DISCHARGE MANAGEMENT: CPT | Performed by: NURSE PRACTITIONER

## 2020-11-14 PROCEDURE — 80053 COMPREHEN METABOLIC PANEL: CPT | Performed by: NURSE PRACTITIONER

## 2020-11-14 PROCEDURE — 83735 ASSAY OF MAGNESIUM: CPT | Performed by: NURSE PRACTITIONER

## 2020-11-14 RX ORDER — OMEPRAZOLE 40 MG/1
40 CAPSULE, DELAYED RELEASE ORAL DAILY
Qty: 30 CAPSULE | Refills: 0 | Status: SHIPPED | OUTPATIENT
Start: 2020-11-14 | End: 2021-09-21

## 2020-11-14 RX ORDER — ONDANSETRON 4 MG/1
4 TABLET, FILM COATED ORAL EVERY 8 HOURS PRN
Qty: 20 TABLET | Refills: 0 | Status: SHIPPED | OUTPATIENT
Start: 2020-11-14 | End: 2021-08-17

## 2020-11-14 RX ADMIN — METOPROLOL SUCCINATE 100 MG: 50 TABLET, EXTENDED RELEASE ORAL at 09:18

## 2020-11-14 RX ADMIN — SODIUM CHLORIDE AND POTASSIUM CHLORIDE 100 ML/HR: .9; .15 SOLUTION INTRAVENOUS at 04:07

## 2020-11-14 RX ADMIN — PANTOPRAZOLE SODIUM 40 MG: 40 TABLET, DELAYED RELEASE ORAL at 05:53

## 2020-11-14 RX ADMIN — HYDROCHLOROTHIAZIDE 12.5 MG: 12.5 TABLET ORAL at 09:18

## 2020-11-14 RX ADMIN — ATORVASTATIN CALCIUM 20 MG: 20 TABLET, FILM COATED ORAL at 09:18

## 2020-11-16 ENCOUNTER — TRANSITIONAL CARE MANAGEMENT (OUTPATIENT)
Dept: FAMILY MEDICINE CLINIC | Facility: CLINIC | Age: 55
End: 2020-11-16

## 2020-11-17 ENCOUNTER — TRANSITIONAL CARE MANAGEMENT (OUTPATIENT)
Dept: FAMILY MEDICINE CLINIC | Facility: CLINIC | Age: 55
End: 2020-11-17

## 2021-01-21 ENCOUNTER — TELEPHONE (OUTPATIENT)
Dept: NEUROSURGERY | Facility: CLINIC | Age: 56
End: 2021-01-21

## 2021-01-21 NOTE — TELEPHONE ENCOUNTER
Patient returned my call and stated that at this time she does not feel comfortable to go to any doctors office's  I offered her Telemed visit but she still needs to have her MRI brain, Visual field and OCT Testing done prior to this appointment and right now she is not willing to have this done  Patient requested we cancel her appointment until she is able to have COVID vaccine

## 2021-01-21 NOTE — TELEPHONE ENCOUNTER
L/m on mobile phone patient has appointment with Natasha Kapadia on 1/25/21 at [address] [place] [address]  For this appointment orders for MRI Brain, & Visual field studies with OCT Testing needs to be done  If patient has not had these done prior to her appointment will need to be rescheduled

## 2021-01-27 DIAGNOSIS — Z23 ENCOUNTER FOR IMMUNIZATION: ICD-10-CM

## 2021-02-05 ENCOUNTER — IMMUNIZATIONS (OUTPATIENT)
Dept: FAMILY MEDICINE CLINIC | Facility: HOSPITAL | Age: 56
End: 2021-02-05

## 2021-02-05 DIAGNOSIS — Z23 ENCOUNTER FOR IMMUNIZATION: Primary | ICD-10-CM

## 2021-02-05 PROCEDURE — 0011A SARS-COV-2 / COVID-19 MRNA VACCINE (MODERNA) 100 MCG: CPT

## 2021-02-05 PROCEDURE — 91301 SARS-COV-2 / COVID-19 MRNA VACCINE (MODERNA) 100 MCG: CPT

## 2021-03-03 ENCOUNTER — IMMUNIZATIONS (OUTPATIENT)
Dept: FAMILY MEDICINE CLINIC | Facility: HOSPITAL | Age: 56
End: 2021-03-03

## 2021-03-03 DIAGNOSIS — Z23 ENCOUNTER FOR IMMUNIZATION: Primary | ICD-10-CM

## 2021-03-03 PROCEDURE — 0012A SARS-COV-2 / COVID-19 MRNA VACCINE (MODERNA) 100 MCG: CPT

## 2021-03-03 PROCEDURE — 91301 SARS-COV-2 / COVID-19 MRNA VACCINE (MODERNA) 100 MCG: CPT

## 2021-04-12 ENCOUNTER — TRANSCRIBE ORDERS (OUTPATIENT)
Dept: LAB | Facility: CLINIC | Age: 56
End: 2021-04-12

## 2021-04-12 ENCOUNTER — APPOINTMENT (OUTPATIENT)
Dept: LAB | Facility: CLINIC | Age: 56
End: 2021-04-12
Payer: COMMERCIAL

## 2021-04-12 DIAGNOSIS — E78.00 ELEVATED LDL CHOLESTEROL LEVEL: ICD-10-CM

## 2021-04-12 LAB
CHOLEST SERPL-MCNC: 272 MG/DL (ref 50–200)
HDLC SERPL-MCNC: 49 MG/DL
LDLC SERPL CALC-MCNC: 186 MG/DL (ref 0–100)
NONHDLC SERPL-MCNC: 223 MG/DL
TRIGL SERPL-MCNC: 186 MG/DL

## 2021-04-12 PROCEDURE — 80061 LIPID PANEL: CPT

## 2021-04-12 PROCEDURE — 36415 COLL VENOUS BLD VENIPUNCTURE: CPT

## 2021-04-20 ENCOUNTER — OFFICE VISIT (OUTPATIENT)
Dept: FAMILY MEDICINE CLINIC | Facility: CLINIC | Age: 56
End: 2021-04-20
Payer: COMMERCIAL

## 2021-04-20 VITALS
HEIGHT: 64 IN | HEART RATE: 97 BPM | BODY MASS INDEX: 32.17 KG/M2 | SYSTOLIC BLOOD PRESSURE: 152 MMHG | TEMPERATURE: 97.8 F | WEIGHT: 188.4 LBS | OXYGEN SATURATION: 99 % | DIASTOLIC BLOOD PRESSURE: 90 MMHG

## 2021-04-20 DIAGNOSIS — I10 ESSENTIAL HYPERTENSION: ICD-10-CM

## 2021-04-20 DIAGNOSIS — Z13.31 NEGATIVE DEPRESSION SCREENING: ICD-10-CM

## 2021-04-20 DIAGNOSIS — L98.9 SKIN LESIONS: ICD-10-CM

## 2021-04-20 DIAGNOSIS — E78.00 HYPERCHOLESTEROLEMIA: Primary | ICD-10-CM

## 2021-04-20 DIAGNOSIS — M79.671 RIGHT FOOT PAIN: ICD-10-CM

## 2021-04-20 DIAGNOSIS — E66.09 CLASS 1 OBESITY DUE TO EXCESS CALORIES WITH SERIOUS COMORBIDITY AND BODY MASS INDEX (BMI) OF 32.0 TO 32.9 IN ADULT: ICD-10-CM

## 2021-04-20 DIAGNOSIS — Z12.4 SCREENING FOR CERVICAL CANCER: ICD-10-CM

## 2021-04-20 PROCEDURE — 3725F SCREEN DEPRESSION PERFORMED: CPT | Performed by: FAMILY MEDICINE

## 2021-04-20 PROCEDURE — 3008F BODY MASS INDEX DOCD: CPT | Performed by: FAMILY MEDICINE

## 2021-04-20 PROCEDURE — 1036F TOBACCO NON-USER: CPT | Performed by: FAMILY MEDICINE

## 2021-04-20 PROCEDURE — 99214 OFFICE O/P EST MOD 30 MIN: CPT | Performed by: FAMILY MEDICINE

## 2021-04-20 PROCEDURE — 3080F DIAST BP >= 90 MM HG: CPT | Performed by: FAMILY MEDICINE

## 2021-04-20 PROCEDURE — 3077F SYST BP >= 140 MM HG: CPT | Performed by: FAMILY MEDICINE

## 2021-04-20 RX ORDER — ATORVASTATIN CALCIUM 20 MG/1
20 TABLET, FILM COATED ORAL DAILY
Qty: 30 TABLET | Refills: 6 | Status: SHIPPED | OUTPATIENT
Start: 2021-04-20 | End: 2021-09-21

## 2021-04-20 NOTE — PROGRESS NOTES
Assessment/Plan:    No problem-specific Assessment & Plan notes found for this encounter  Diagnoses and all orders for this visit:    Hypercholesterolemia  -     atorvastatin (LIPITOR) 20 mg tablet; Take 1 tablet (20 mg total) by mouth daily  -     Lipid panel; Future  -     Comprehensive metabolic panel; Future    Essential hypertension  Comments:  keep a home log    Class 1 obesity due to excess calories with serious comorbidity and body mass index (BMI) of 32 0 to 32 9 in adult    Negative depression screening    Screening for cervical cancer  -     Ambulatory referral to Obstetrics / Gynecology; Future    Skin lesions  -     Ambulatory referral to Dermatology; Future    Right foot pain  -     Ambulatory referral to Podiatry; Future          PHQ-9 Depression Screening    PHQ-9:   Frequency of the following problems over the past two weeks:      Little interest or pleasure in doing things: 0 - not at all  Feeling down, depressed, or hopeless: 0 - not at all  PHQ-2 Score: 0        BMI Counseling: Body mass index is 32 34 kg/m²  The BMI is above normal  Nutrition recommendations include reducing portion sizes and 3-5 servings of fruits/vegetables daily  Exercise recommendations include moderate aerobic physical activity for 150 minutes/week and exercising 3-5 times per week  Subjective:      Patient ID: Bogdan Mcarthur is a 55 y o  female  Pt is checking Bps at home and seeing high numbers, pt stopped cholesterol medication on her own without informing the office    Hyperlipidemia  This is a chronic problem  The current episode started more than 1 year ago  The problem is uncontrolled  Recent lipid tests were reviewed and are high  Factors aggravating her hyperlipidemia include fatty foods  Pertinent negatives include no chest pain or myalgias  Current antihyperlipidemic treatment includes statins  The current treatment provides significant improvement of lipids   Compliance problems include adherence to diet and adherence to exercise  The following portions of the patient's history were reviewed and updated as appropriate: allergies, current medications, past family history, past medical history, past social history, past surgical history and problem list     Review of Systems   Cardiovascular: Negative for chest pain and palpitations  Gastrointestinal: Negative for abdominal pain, nausea and vomiting  Musculoskeletal: Negative for myalgias  Objective:    /90 (BP Location: Left arm, Patient Position: Sitting, Cuff Size: Large)   Pulse 97   Temp 97 8 °F (36 6 °C)   Ht 5' 4" (1 626 m)   Wt 85 5 kg (188 lb 6 4 oz)   SpO2 99%   BMI 32 34 kg/m²      Physical Exam  Vitals signs and nursing note reviewed  Constitutional:       General: She is not in acute distress  Appearance: Normal appearance  She is not ill-appearing  HENT:      Head: Normocephalic and atraumatic  Eyes:      Conjunctiva/sclera: Conjunctivae normal    Neck:      Musculoskeletal: Normal range of motion and neck supple  No neck rigidity  Cardiovascular:      Rate and Rhythm: Normal rate and regular rhythm  Heart sounds: Normal heart sounds  No murmur  Pulmonary:      Effort: Pulmonary effort is normal  No respiratory distress  Breath sounds: Normal breath sounds  No wheezing, rhonchi or rales  Abdominal:      General: There is no distension  Palpations: Abdomen is soft  There is no mass  Tenderness: There is no abdominal tenderness  There is no guarding or rebound  Musculoskeletal:      Right lower leg: No edema  Left lower leg: No edema  Lymphadenopathy:      Cervical: No cervical adenopathy  Skin:     General: Skin is warm and dry  Neurological:      Mental Status: She is alert and oriented to person, place, and time  Psychiatric:         Mood and Affect: Mood normal          Behavior: Behavior normal          Thought Content:  Thought content normal          Judgment: Judgment normal

## 2021-05-18 DIAGNOSIS — I10 ESSENTIAL HYPERTENSION: Chronic | ICD-10-CM

## 2021-05-18 RX ORDER — METOPROLOL SUCCINATE 100 MG/1
TABLET, EXTENDED RELEASE ORAL
Qty: 90 TABLET | Refills: 1 | Status: SHIPPED | OUTPATIENT
Start: 2021-05-18 | End: 2022-03-07

## 2021-05-18 RX ORDER — HYDROCHLOROTHIAZIDE 12.5 MG/1
TABLET ORAL
Qty: 90 TABLET | Refills: 1 | Status: SHIPPED | OUTPATIENT
Start: 2021-05-18 | End: 2022-03-07

## 2021-06-01 ENCOUNTER — TRANSCRIBE ORDERS (OUTPATIENT)
Dept: ADMINISTRATIVE | Facility: HOSPITAL | Age: 56
End: 2021-06-01

## 2021-06-01 DIAGNOSIS — Z12.31 VISIT FOR SCREENING MAMMOGRAM: Primary | ICD-10-CM

## 2021-06-05 ENCOUNTER — HOSPITAL ENCOUNTER (EMERGENCY)
Facility: HOSPITAL | Age: 56
Discharge: HOME | End: 2021-06-05
Attending: EMERGENCY MEDICINE | Admitting: EMERGENCY MEDICINE
Payer: COMMERCIAL

## 2021-06-05 ENCOUNTER — HOSPITAL ENCOUNTER (OUTPATIENT)
Dept: MAMMOGRAPHY | Facility: HOSPITAL | Age: 56
Discharge: HOME | End: 2021-06-05
Attending: FAMILY MEDICINE
Payer: COMMERCIAL

## 2021-06-05 VITALS
HEART RATE: 105 BPM | SYSTOLIC BLOOD PRESSURE: 178 MMHG | OXYGEN SATURATION: 99 % | BODY MASS INDEX: 31.89 KG/M2 | RESPIRATION RATE: 16 BRPM | DIASTOLIC BLOOD PRESSURE: 100 MMHG | HEIGHT: 63 IN | TEMPERATURE: 98.6 F | WEIGHT: 180 LBS

## 2021-06-05 VITALS — WEIGHT: 188 LBS | BODY MASS INDEX: 32.1 KG/M2 | HEIGHT: 64 IN

## 2021-06-05 DIAGNOSIS — R09.81 NASAL CONGESTION: Primary | ICD-10-CM

## 2021-06-05 DIAGNOSIS — Z12.31 VISIT FOR SCREENING MAMMOGRAM: ICD-10-CM

## 2021-06-05 LAB — SARS-COV-2 RNA RESP QL NAA+PROBE: NEGATIVE

## 2021-06-05 PROCEDURE — 99283 EMERGENCY DEPT VISIT LOW MDM: CPT

## 2021-06-05 PROCEDURE — 99284 EMERGENCY DEPT VISIT MOD MDM: CPT | Performed by: EMERGENCY MEDICINE

## 2021-06-05 PROCEDURE — U0003 INFECTIOUS AGENT DETECTION BY NUCLEIC ACID (DNA OR RNA); SEVERE ACUTE RESPIRATORY SYNDROME CORONAVIRUS 2 (SARS-COV-2) (CORONAVIRUS DISEASE [COVID-19]), AMPLIFIED PROBE TECHNIQUE, MAKING USE OF HIGH THROUGHPUT TECHNOLOGIES AS DESCRIBED BY CMS-2020-01-R: HCPCS | Performed by: EMERGENCY MEDICINE

## 2021-06-05 PROCEDURE — 77063 BREAST TOMOSYNTHESIS BI: CPT

## 2021-06-05 PROCEDURE — U0005 INFEC AGEN DETEC AMPLI PROBE: HCPCS | Performed by: EMERGENCY MEDICINE

## 2021-06-05 PROCEDURE — 77067 SCR MAMMO BI INCL CAD: CPT

## 2021-06-06 NOTE — DISCHARGE INSTRUCTIONS
Take Vitamin D 2000 IU daily, Vitamin C 1000mg twice daily and a multivitamin with zinc in it as it has been shown to help COVID symptoms  These can all be found over the counter  You were considered safe to be discharged with COVID however if you get increasingly short of breath or have any chest pain, please return to the emergency department  It may be helpful for you to get a pulse oximeter from a pharmacy  If your oxygen level is 92% or less a while at rest, please return to the ER  Anybody that you have been around especially without a mask recently should also quarantine and get tested if they have symptoms  You should plan to quarantine for 14 days  Please follow-up with family doctor and if your symptoms are considered mild, you may only need to quarantine for 10 days

## 2021-06-06 NOTE — ED PROVIDER NOTES
History  Chief Complaint   Patient presents with    Nasal Congestion     Patient reports loss of smell of smell today  Nasal congestion started on thursday  HPI    Patient is a pleasant well-appearing 52-year-old female that reports to the emergency department slight nasal congestion as well as a loss of smell that started today  She notes that she is feeling well otherwise  No fevers, chills, sweats, nausea, vomiting, diarrhea, lightheadedness, dizziness  No dysuria or hematuria to suggest urinary tract infection  No abdominal pain  No wounds  Medical decision making:  Pleasant 52-year-old female, will check for COVID, otherwise, well appearing, will discharge home, follow-up instructions  Prior to Admission Medications   Prescriptions Last Dose Informant Patient Reported?  Taking?   atorvastatin (LIPITOR) 20 mg tablet   No No   Sig: Take 1 tablet (20 mg total) by mouth daily   docusate sodium (COLACE) 100 mg capsule   No No   Sig: Take 1 capsule (100 mg total) by mouth 2 (two) times a day   Patient taking differently: Take 100 mg by mouth as needed    hydrochlorothiazide (HYDRODIURIL) 12 5 mg tablet   No No   Sig: TAKE 1 TABLET BY MOUTH EVERY DAY   metoprolol succinate (TOPROL-XL) 100 mg 24 hr tablet   No No   Sig: TAKE 1 TABLET BY MOUTH EVERY DAY   omeprazole (PriLOSEC) 40 MG capsule   No No   Sig: Take 1 capsule (40 mg total) by mouth daily   Patient not taking: Reported on 4/20/2021   ondansetron (ZOFRAN) 4 mg tablet   No No   Sig: Take 1 tablet (4 mg total) by mouth every 8 (eight) hours as needed for nausea or vomiting   Patient not taking: Reported on 4/20/2021      Facility-Administered Medications: None       Past Medical History:   Diagnosis Date    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    GERD (gastroesophageal reflux disease)     Hypercholesteremia     Hypertension     Pituitary tumor        Past Surgical History:   Procedure Laterality Date    BRAIN SURGERY       SECTION      [place] NEUROENDOSCOP,EXC,PIT ESPERANZA,TRANSNAS/SPHEN N/A 2019    Procedure: Image guided endoscopic transnasal transsphenoidal resection of pituitary mass, abdominal fat graft, possible lumbar drain;  Surgeon: Olga Lezama MD;  [facility];  Service: Neurosurgery    TRANSPHENOIDAL / TRANSNASAL HYPOPHYSECTOMY / RESECTION PITUITARY TUMOR         Family History   Problem Relation Age of Onset    Cancer Mother     Heart disease Father     Hypertension Father     No Known Problems Daughter     No Known Problems Maternal Aunt     Leukemia Brother     No Known Problems Maternal Grandmother     No Known Problems Paternal Grandmother     No Known Problems Maternal Aunt     No Known Problems Paternal Aunt     No Known Problems Paternal Aunt      I have reviewed and agree with the history as documented  E-Cigarette/Vaping    E-Cigarette Use Never User      E-Cigarette/Vaping Substances    Nicotine No     THC No     CBD No     Flavoring No     Other No     Unknown No      Social History     Tobacco Use    Smoking status: Never Smoker    Smokeless tobacco: Never Used   Substance Use Topics    Alcohol use: Not Currently     Frequency: Monthly or less    Drug use: Not Currently       Review of Systems   HENT:        + congestion and loss of smell   All other systems reviewed and are negative  Physical Exam  Physical Exam  Vitals signs and nursing note reviewed  Constitutional:       Appearance: She is well-developed  HENT:      Head: Normocephalic and atraumatic  Right Ear: External ear normal       Left Ear: External ear normal    Eyes:      Conjunctiva/sclera: Conjunctivae normal    Neck:      Musculoskeletal: Normal range of motion and neck supple  Vascular: No JVD  Trachea: No tracheal deviation  Cardiovascular:      Rate and Rhythm: Normal rate and regular rhythm  Heart sounds: Normal heart sounds     Pulmonary:      Effort: Pulmonary effort is normal  No respiratory distress  Breath sounds: No wheezing or rales  Abdominal:      Palpations: Abdomen is soft  Tenderness: There is no abdominal tenderness  There is no guarding or rebound  Musculoskeletal:         General: No tenderness  Skin:     General: Skin is warm and dry  Findings: No erythema or rash  Neurological:      General: No focal deficit present  Mental Status: She is alert and oriented to person, place, and time  Motor: No weakness  Psychiatric:         Behavior: Behavior normal          Thought Content: Thought content normal          Vital Signs  ED Triage Vitals   Temperature Pulse Respirations Blood Pressure SpO2   06/05/21 2109 06/05/21 2106 06/05/21 2106 06/05/21 2106 06/05/21 2106   98 6 °F (37 °C) 105 16 (!) 178/100 99 %      Temp Source Heart Rate Source Patient Position - Orthostatic VS BP Location FiO2 (%)   06/05/21 2109 06/05/21 2106 06/05/21 2106 06/05/21 2106 --   Oral Monitor Lying Left arm       Pain Score       --                  Vitals:    06/05/21 2106   BP: (!) 178/100   Pulse: 105   Patient Position - Orthostatic VS: Lying         Visual Acuity      ED Medications  Medications - No data to display    Diagnostic Studies  Results Reviewed     Procedure Component Value Units Date/Time    [facility] [[id number]]  (Normal) Collected: 06/05/21 2112    Lab Status: Final result Specimen: Nares from Nose Updated: 06/05/21 2214     SARS-CoV-2 Negative    Narrative: The specimen collection materials, transport medium, and/or testing methodology utilized in the production of these test results have been proven to be reliable in a limited validation with an abbreviated program under the Emergency Utilization Authorization provided by the FDA  Testing reported as "Presumptive positive" will be confirmed with secondary testing to ensure result accuracy    Clinical caution and judgement should be used with the interpretation of these results with consideration of the clinical impression and other laboratory testing  Testing reported as "Positive" or "Negative" has been proven to be accurate according to standard laboratory validation requirements  All testing is performed with control materials showing appropriate reactivity at standard intervals  No orders to display              Procedures  Procedures         ED Course                             SBIRT 22yo+      Most Recent Value   SBIRT (22 yo +)   In order to provide better care to our patients, we are screening all of our patients for alcohol and drug use  Would it be okay to ask you these screening questions? Unable to answer at this time Filed at: 06/05/2021 2115                    MDM    Disposition  Final diagnoses:   Nasal congestion     Time reflects when diagnosis was documented in both MDM as applicable and the Disposition within this note     Time User Action Codes Description Comment    6/5/2021  9:10 PM Hampton Schilder, [address] [[address]] Nasal congestion       ED Disposition     ED Disposition Condition Date/Time Comment    Discharge Stable Sat Jun 5, 2021  9:10 PM Maia Lopez discharge to home/self care              Follow-up Information     Follow up With Specialties Details Why Contact Info Additional Nhan Garrido DO Family Medicine In 1 day  [facility] [address] [place]  [phone]       [address]  Emergency Department Emergency Medicine  If symptoms worsen [address] [facility] [address] [phone] [address]  Emergency Department          Discharge Medication List as of 6/5/2021  9:11 PM      CONTINUE these medications which have NOT CHANGED    Details   atorvastatin (LIPITOR) 20 mg tablet Take 1 tablet (20 mg total) by mouth daily, Starting Tue 4/20/2021, Normal      docusate sodium (COLACE) 100 mg capsule Take 1 capsule (100 mg total) by mouth 2 (two) times a day, Starting Mon 11/11/2019, Normal      hydrochlorothiazide (HYDRODIURIL) 12 5 mg tablet TAKE 1 TABLET BY MOUTH EVERY DAY, Normal      metoprolol succinate (TOPROL-XL) 100 mg 24 hr tablet TAKE 1 TABLET BY MOUTH EVERY DAY, Normal      omeprazole (PriLOSEC) 40 MG capsule Take 1 capsule (40 mg total) by mouth daily, Starting Sat 11/14/2020, Until Tue 4/20/2021, Normal      ondansetron (ZOFRAN) 4 mg tablet Take 1 tablet (4 mg total) by mouth every 8 (eight) hours as needed for nausea or vomiting, Starting Sat 11/14/2020, Normal           No discharge procedures on file      PDMP Review       Value Time User    PDMP Reviewed  Yes 11/14/2020  9:30 AM Angela Orr [facility] Provider  Electronically Signed by           Ayush Melissa MD  06/06/21 2917

## 2021-06-18 ENCOUNTER — HOSPITAL ENCOUNTER (OUTPATIENT)
Dept: MRI IMAGING | Facility: HOSPITAL | Age: 56
Discharge: HOME | End: 2021-06-18
Payer: COMMERCIAL

## 2021-06-18 DIAGNOSIS — E89.3 STATUS POST TRANSSPHENOIDAL PITUITARY RESECTION (HCC): ICD-10-CM

## 2021-06-18 DIAGNOSIS — D35.2 PITUITARY MACROADENOMA (HCC): ICD-10-CM

## 2021-06-18 PROCEDURE — 70553 MRI BRAIN STEM W/O & W/DYE: CPT

## 2021-06-18 PROCEDURE — A9585 GADOBUTROL INJECTION: HCPCS | Performed by: PHYSICIAN ASSISTANT

## 2021-06-18 RX ADMIN — GADOBUTROL 8.5 ML: 604.72 INJECTION INTRAVENOUS at 08:18

## 2021-08-17 ENCOUNTER — OFFICE VISIT (OUTPATIENT)
Dept: FAMILY MEDICINE CLINIC | Facility: CLINIC | Age: 56
End: 2021-08-17
Payer: COMMERCIAL

## 2021-08-17 ENCOUNTER — APPOINTMENT (OUTPATIENT)
Dept: LAB | Facility: CLINIC | Age: 56
End: 2021-08-17
Payer: COMMERCIAL

## 2021-08-17 VITALS
TEMPERATURE: 97.7 F | DIASTOLIC BLOOD PRESSURE: 92 MMHG | WEIGHT: 184 LBS | BODY MASS INDEX: 32.6 KG/M2 | RESPIRATION RATE: 18 BRPM | SYSTOLIC BLOOD PRESSURE: 152 MMHG | HEIGHT: 63 IN | HEART RATE: 60 BPM | OXYGEN SATURATION: 99 %

## 2021-08-17 DIAGNOSIS — E78.00 HYPERCHOLESTEROLEMIA: ICD-10-CM

## 2021-08-17 DIAGNOSIS — M25.511 ACUTE PAIN OF RIGHT SHOULDER: ICD-10-CM

## 2021-08-17 DIAGNOSIS — I10 ESSENTIAL HYPERTENSION: Primary | ICD-10-CM

## 2021-08-17 PROBLEM — E11.9 TYPE 2 DIABETES MELLITUS WITHOUT COMPLICATION, WITHOUT LONG-TERM CURRENT USE OF INSULIN (HCC): Status: ACTIVE | Noted: 2021-08-17

## 2021-08-17 LAB
ALBUMIN SERPL BCP-MCNC: 3.5 G/DL (ref 3.5–5)
ALP SERPL-CCNC: 81 U/L (ref 46–116)
ALT SERPL W P-5'-P-CCNC: 35 U/L (ref 12–78)
ANION GAP SERPL CALCULATED.3IONS-SCNC: 3 MMOL/L (ref 4–13)
AST SERPL W P-5'-P-CCNC: 25 U/L (ref 5–45)
BILIRUB SERPL-MCNC: 0.35 MG/DL (ref 0.2–1)
BUN SERPL-MCNC: 17 MG/DL (ref 5–25)
CALCIUM SERPL-MCNC: 8.9 MG/DL (ref 8.3–10.1)
CHLORIDE SERPL-SCNC: 109 MMOL/L (ref 100–108)
CHOLEST SERPL-MCNC: 264 MG/DL (ref 50–200)
CO2 SERPL-SCNC: 28 MMOL/L (ref 21–32)
CREAT SERPL-MCNC: 0.89 MG/DL (ref 0.6–1.3)
GFR SERPL CREATININE-BSD FRML MDRD: 84 ML/MIN/1.73SQ M
GLUCOSE P FAST SERPL-MCNC: 93 MG/DL (ref 65–99)
HDLC SERPL-MCNC: 64 MG/DL
LDLC SERPL CALC-MCNC: 181 MG/DL (ref 0–100)
NONHDLC SERPL-MCNC: 200 MG/DL
POTASSIUM SERPL-SCNC: 3.8 MMOL/L (ref 3.5–5.3)
PROT SERPL-MCNC: 7.8 G/DL (ref 6.4–8.2)
SODIUM SERPL-SCNC: 140 MMOL/L (ref 136–145)
TRIGL SERPL-MCNC: 93 MG/DL

## 2021-08-17 PROCEDURE — 1036F TOBACCO NON-USER: CPT | Performed by: FAMILY MEDICINE

## 2021-08-17 PROCEDURE — 99213 OFFICE O/P EST LOW 20 MIN: CPT | Performed by: FAMILY MEDICINE

## 2021-08-17 PROCEDURE — 3080F DIAST BP >= 90 MM HG: CPT | Performed by: FAMILY MEDICINE

## 2021-08-17 PROCEDURE — 80061 LIPID PANEL: CPT

## 2021-08-17 PROCEDURE — 80053 COMPREHEN METABOLIC PANEL: CPT

## 2021-08-17 PROCEDURE — 3008F BODY MASS INDEX DOCD: CPT | Performed by: FAMILY MEDICINE

## 2021-08-17 PROCEDURE — 3077F SYST BP >= 140 MM HG: CPT | Performed by: FAMILY MEDICINE

## 2021-08-17 PROCEDURE — 36415 COLL VENOUS BLD VENIPUNCTURE: CPT

## 2021-08-17 RX ORDER — METHYLPREDNISOLONE 4 MG/1
TABLET ORAL
Qty: 21 EACH | Refills: 0 | Status: SHIPPED | OUTPATIENT
Start: 2021-08-17 | End: 2021-09-21

## 2021-09-17 ENCOUNTER — HOSPITAL ENCOUNTER (EMERGENCY)
Facility: HOSPITAL | Age: 56
Discharge: HOME | End: 2021-09-17
Attending: EMERGENCY MEDICINE
Payer: COMMERCIAL

## 2021-09-17 VITALS
WEIGHT: 184 LBS | OXYGEN SATURATION: 100 % | RESPIRATION RATE: 18 BRPM | SYSTOLIC BLOOD PRESSURE: 174 MMHG | TEMPERATURE: 97.4 F | BODY MASS INDEX: 32.59 KG/M2 | DIASTOLIC BLOOD PRESSURE: 89 MMHG | HEART RATE: 67 BPM
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
## 2021-09-17 DIAGNOSIS — L02.416 CELLULITIS AND ABSCESS OF LEFT LEG: ICD-10-CM

## 2021-09-17 DIAGNOSIS — L03.116 CELLULITIS AND ABSCESS OF LEFT LEG: ICD-10-CM

## 2021-09-17 PROCEDURE — 99284 EMERGENCY DEPT VISIT MOD MDM: CPT | Performed by: EMERGENCY MEDICINE

## 2021-09-17 PROCEDURE — 99282 EMERGENCY DEPT VISIT SF MDM: CPT

## 2021-09-17 PROCEDURE — 36415 COLL VENOUS BLD VENIPUNCTURE: CPT | Performed by: EMERGENCY MEDICINE

## 2021-09-17 PROCEDURE — 86618 LYME DISEASE ANTIBODY: CPT | Performed by: EMERGENCY MEDICINE

## 2021-09-17 RX ORDER — CEPHALEXIN 500 MG/1
500 CAPSULE ORAL ONCE
Status: COMPLETED | OUTPATIENT
Start: 2021-09-17 | End: 2021-09-17

## 2021-09-17 RX ORDER — CEPHALEXIN 500 MG/1
500 CAPSULE ORAL EVERY 12 HOURS SCHEDULED
Qty: 10 CAPSULE | Refills: 0 | Status: SHIPPED | OUTPATIENT
Start: 2021-09-17 | End: 2021-09-22

## 2021-09-17 RX ADMIN — CEPHALEXIN 500 MG: 500 CAPSULE ORAL at 16:23

## 2021-09-17 NOTE — ED PROVIDER NOTES
History  Chief Complaint   Patient presents with   [address]     pt reports she is not sure if something bit her to her lower legs  pt reports concern for one of the bites to her left shin stating she has a history of blood clots  55-year-old female presents emergency room complaining of 2 insect bites the left lower extremity  One was on the medial aspect of the pretibial region and 1 on the lateral   She states the 1 on the lateral has gotten more swollen over the last 3 days and there is concern for the potential blood clot, being that the patient is had 1 in the past   The patient denies any fever chills or red streaks going up the leg  She is here today for evaluation of these to bites  She did not see an insect actually land on her leg however  Prior to Admission Medications   Prescriptions Last Dose Informant Patient Reported?  Taking?   atorvastatin (LIPITOR) 20 mg tablet   No No   Sig: Take 1 tablet (20 mg total) by mouth daily   hydrochlorothiazide (HYDRODIURIL) 12 5 mg tablet   No No   Sig: TAKE 1 TABLET BY MOUTH EVERY DAY   methylPREDNISolone 4 MG tablet therapy pack   No No   Sig: Use as directed on package   metoprolol succinate (TOPROL-XL) 100 mg 24 hr tablet   No No   Sig: TAKE 1 TABLET BY MOUTH EVERY DAY   omeprazole (PriLOSEC) 40 MG capsule   No No   Sig: Take 1 capsule (40 mg total) by mouth daily   Patient not taking: Reported on 2021      Facility-Administered Medications: None       Past Medical History:   Diagnosis Date    Compression of optic chiasm 2019    GERD (gastroesophageal reflux disease)     Hypercholesteremia     Hypertension     Pituitary tumor        Past Surgical History:   Procedure Laterality Date    BRAIN SURGERY       SECTION      [place] NEUROENDOSCOP,EXC,PIT ESPERANZA,TRANSNAS/SPHEN N/A 2019    Procedure: Image guided endoscopic transnasal transsphenoidal resection of pituitary mass, abdominal fat graft, possible lumbar drain; Surgeon: Trinity Yuen MD;  [facility];  Service: Neurosurgery    TRANSPHENOIDAL / TRANSNASAL HYPOPHYSECTOMY / RESECTION PITUITARY TUMOR         Family History   Problem Relation Age of Onset    Cancer Mother     Heart disease Father     Hypertension Father     No Known Problems Daughter     No Known Problems Maternal Aunt     Leukemia Brother     No Known Problems Maternal Grandmother     No Known Problems Paternal Grandmother     No Known Problems Maternal Aunt     No Known Problems Paternal Aunt     No Known Problems Paternal Aunt      I have reviewed and agree with the history as documented  E-Cigarette/Vaping    E-Cigarette Use Never User      E-Cigarette/Vaping Substances    Nicotine No     THC No     CBD No     Flavoring No     Other No     Unknown No      Social History     Tobacco Use    Smoking status: Never Smoker    Smokeless tobacco: Never Used   Vaping Use    Vaping Use: Never used   Substance Use Topics    Alcohol use: Not Currently    Drug use: Not Currently       Review of Systems   Constitutional: Negative for chills and fever  HENT: Negative for ear pain and sore throat  Eyes: Negative for pain and visual disturbance  Respiratory: Negative for cough and shortness of breath  Cardiovascular: Negative for chest pain and palpitations  Gastrointestinal: Negative for abdominal pain and vomiting  Genitourinary: Negative for dysuria and hematuria  Musculoskeletal: Negative for arthralgias, back pain and joint swelling  Skin: Positive for rash  Neurological: Negative for syncope  All other systems reviewed and are negative  Physical Exam  Physical Exam  Vitals and nursing note reviewed  Constitutional:       General: She is not in acute distress  Appearance: She is well-developed  HENT:      Head: Normocephalic and atraumatic        Right Ear: External ear normal       Left Ear: External ear normal    Eyes:      Conjunctiva/sclera: Conjunctivae normal    Cardiovascular:      Rate and Rhythm: Normal rate and regular rhythm  Heart sounds: No murmur heard  Pulmonary:      Effort: Pulmonary effort is normal  No respiratory distress  Breath sounds: Normal breath sounds  Abdominal:      Palpations: Abdomen is soft  Tenderness: There is no abdominal tenderness  Musculoskeletal:      Cervical back: Neck supple  Skin:     General: Skin is warm and dry  Capillary Refill: Capillary refill takes less than 2 seconds  Comments: Left lower extremity shows 2 areas of erythema which are likely due to insect bites  Over the upper 1/3 of the lateral pretibial region, there is a 3 cm in diameter room slightly raised erythematous patch  There is no central wound or lymphangitic streaks  The area is very minimally tender but more pruritic in nature  Kelley Dusky on the right pretibial area medially, there is a 1 cm smaller wound which appears to be erythematous and minimally raised  There is no lymphangitic streaking or central wound noted  Pulses are 2/4 in the bilateral lower extremities at the dorsalis pedis and posterior tibial regions  There is no pain with palpation of the calf and no edema to the leg  Neurological:      Mental Status: She is alert           Vital Signs  ED Triage Vitals [09/17/21 1609]   Temperature Pulse Respirations Blood Pressure SpO2   (!) 97 4 °F (36 3 °C) 67 18 (!) 174/89 100 %      Temp Source Heart Rate Source Patient Position - Orthostatic VS BP Location FiO2 (%)   Temporal Monitor -- Left arm --      Pain Score       --           Vitals:    09/17/21 1609   BP: (!) 174/89   Pulse: 67         Visual Acuity      ED Medications  Medications   cephalexin (KEFLEX) capsule 500 mg (500 mg Oral Given 9/17/21 1623)       Diagnostic Studies  Results Reviewed     Procedure Component Value Units Date/Time    Lyme Antibody Profile with reflex to WB [[id number]]     Lab Status: No result Specimen: Blood No orders to display              Procedures  Procedures         ED Course  ED Course as of Sep 17 1626   Fri Sep 17, 2021   1624 I discussed with the patient that I do believe that the patient has 2 areas are consistent with a DVT  The patient has no leg edema or calf pain  Patient notes in the past she had these issues when she had a DVT  I will check the patient for Lyme due to the potential for early erythema migrans and also start Keflex 1 pill twice a day for 5 days to prevent infection  The patient was told apply hydrocortisone cream to the wound each day  SBIRT 22yo+      Most Recent Value   SBIRT (22 yo +)   In order to provide better care to our patients, we are screening all of our patients for alcohol and drug use  Would it be okay to ask you these screening questions? Yes Filed at: 09/17/2021 1613   Initial Alcohol Screen: US AUDIT-C    1  How often do you have a drink containing alcohol?  0 Filed at: 09/17/2021 1613   2  How many drinks containing alcohol do you have on a typical day you are drinking? 0 Filed at: 09/17/2021 1613   3a  Male UNDER 65: How often do you have five or more drinks on one occasion? 0 Filed at: 09/17/2021 1613   3b  FEMALE Any Age, or MALE 65+: How often do you have 4 or more drinks on one occassion? 0 Filed at: 09/17/2021 1613   Audit-C Score  0 Filed at: 09/17/2021 1613   KEIRA: How many times in the past year have you    Used an illegal drug or used a prescription medication for non-medical reasons? Never Filed at: 09/17/2021 1613                    MDM    Disposition  Final diagnoses:   Insect bite   Cellulitis and abscess of left leg     Time reflects when diagnosis was documented in both MDM as applicable and the Disposition within this note     Time User Action Codes Description Comment    9/17/2021  4:17 PM BruCarmen parmar Add Pritesh Barragan  XXXA] Insect bite     9/17/2021  4:18 PM Kerry Pa Add [[id number],  E31 [id number]] Cellulitis and abscess of left leg       ED Disposition     ED Disposition Condition Date/Time Comment    Discharge Stable Fri Sep 17, 2021  4:17 PM Maureen Tomas discharge to home/self care  Follow-up Information     Follow up With Specialties Details Why [address] Mildred Multani DO Family Medicine On 9/21/2021  [address]  [employer] [place] [address]  [phone]            Patient's Medications   Discharge Prescriptions    CEPHALEXIN (KEFLEX) 500 MG CAPSULE    Take 1 capsule (500 mg total) by mouth every 12 (twelve) hours for 5 days       Start Date: 9/17/2021 End Date: 9/22/2021       Order Dose: 500 mg       Quantity: 10 capsule    Refills: 0     No discharge procedures on file      PDMP Review       Value Time User    PDMP Reviewed  Yes 11/14/2020  9:30 AM Angela Duncan          ED Provider  Electronically Signed by           Tenisha Morales DO  09/17/21 1625       Tenisha Morales DO  09/17/21 1626

## 2021-09-17 NOTE — DISCHARGE INSTRUCTIONS
Continue to use over-the-counter hydrocortisone cream to the bites twice a day  I would add Keflex 1 pill twice a day for 5 days to try to prevent any infection  Please follow-up with your doctor regarding the results of the Lyme test that was done today  If Lyme is positive you will need a multi week prescription for antibiotics

## 2021-09-18 LAB — B BURGDOR IGG+IGM SER-ACNC: 94

## 2021-09-21 ENCOUNTER — OFFICE VISIT (OUTPATIENT)
Dept: FAMILY MEDICINE CLINIC | Facility: CLINIC | Age: 56
End: 2021-09-21
Payer: COMMERCIAL

## 2021-09-21 VITALS
HEART RATE: 55 BPM | BODY MASS INDEX: 34.12 KG/M2 | DIASTOLIC BLOOD PRESSURE: 72 MMHG | SYSTOLIC BLOOD PRESSURE: 124 MMHG | TEMPERATURE: 96.9 F | HEIGHT: 63 IN | WEIGHT: 192.6 LBS | OXYGEN SATURATION: 95 %

## 2021-09-21 DIAGNOSIS — M25.511 ACUTE PAIN OF RIGHT SHOULDER: ICD-10-CM

## 2021-09-21 DIAGNOSIS — Z11.59 NEED FOR HEPATITIS C SCREENING TEST: ICD-10-CM

## 2021-09-21 DIAGNOSIS — Z12.11 SCREENING FOR COLORECTAL CANCER: ICD-10-CM

## 2021-09-21 DIAGNOSIS — Z11.4 SCREENING FOR HIV (HUMAN IMMUNODEFICIENCY VIRUS): ICD-10-CM

## 2021-09-21 DIAGNOSIS — E11.9 TYPE 2 DIABETES MELLITUS WITHOUT COMPLICATION, WITHOUT LONG-TERM CURRENT USE OF INSULIN (HCC): ICD-10-CM

## 2021-09-21 DIAGNOSIS — Z00.00 ANNUAL PHYSICAL EXAM: Primary | ICD-10-CM

## 2021-09-21 DIAGNOSIS — Z12.4 SCREENING FOR CERVICAL CANCER: ICD-10-CM

## 2021-09-21 DIAGNOSIS — Z23 ENCOUNTER FOR IMMUNIZATION: ICD-10-CM

## 2021-09-21 DIAGNOSIS — Z12.12 SCREENING FOR COLORECTAL CANCER: ICD-10-CM

## 2021-09-21 PROCEDURE — 1036F TOBACCO NON-USER: CPT | Performed by: FAMILY MEDICINE

## 2021-09-21 PROCEDURE — 3074F SYST BP LT 130 MM HG: CPT | Performed by: FAMILY MEDICINE

## 2021-09-21 PROCEDURE — 3078F DIAST BP <80 MM HG: CPT | Performed by: FAMILY MEDICINE

## 2021-09-21 PROCEDURE — 99396 PREV VISIT EST AGE 40-64: CPT | Performed by: FAMILY MEDICINE

## 2021-09-21 PROCEDURE — 3008F BODY MASS INDEX DOCD: CPT | Performed by: FAMILY MEDICINE

## 2021-09-21 PROCEDURE — 3725F SCREEN DEPRESSION PERFORMED: CPT | Performed by: FAMILY MEDICINE

## 2021-09-21 PROCEDURE — 99214 OFFICE O/P EST MOD 30 MIN: CPT | Performed by: FAMILY MEDICINE

## 2021-09-21 RX ORDER — NAPROXEN 500 MG/1
500 TABLET ORAL 2 TIMES DAILY WITH MEALS
Qty: 14 TABLET | Refills: 0 | Status: SHIPPED | OUTPATIENT
Start: 2021-09-21 | End: 2022-04-12

## 2021-09-21 RX ORDER — ROSUVASTATIN CALCIUM 10 MG/1
10 TABLET, COATED ORAL DAILY
Qty: 30 TABLET | Refills: 5 | Status: SHIPPED | OUTPATIENT
Start: 2021-09-21 | End: 2022-04-05

## 2021-09-21 NOTE — PROGRESS NOTES
Assessment/Plan:      Diagnoses and all orders for this visit:    Annual physical exam    Need for hepatitis C screening test  -     Hepatitis C Antibody ([facility]); Future    Encounter for immunization  -     Cancel: PNEUMOCOCCAL POLYSACCHARIDE VACCINE 23-VALENT =>3YO SQ IM    Screening for HIV (human immunodeficiency virus)  -     HIV 1/2 Antigen/Antibody (4th Generation) w Reflex [facility]; Future    Screening for colorectal cancer  -     Cologuard    Type 2 diabetes mellitus without complication, without long-term current use of insulin ([facility] [address] )  -     Microalbumin / creatinine urine ratio ([facility]); Future  -     Hemoglobin A1C ([facility]); Future  -     rosuvastatin (CRESTOR) 10 MG tablet; Take 1 tablet (10 mg total) by mouth daily    Screening for cervical cancer  -     Ambulatory referral to Gynecology; Future    Acute pain of right shoulder  -     naproxen (NAPROSYN) 500 mg tablet; Take 1 tablet (500 mg total) by mouth 2 (two) times a day with meals for 7 days  -     Ambulatory referral to Physical Therapy; Future    Other orders  -     Cancel: Ambulatory referral to Gastroenterology; Future  -     Cancel: Ambulatory Referral to Ophthalmology; Future  -     Cancel: Diabetic foot exam; Future          Return in 4 weeks (on 10/19/2021) for DM visit  The following portions of the patient's history were reviewed and updated as appropriate: allergies, current medications, past family history, past medical history, past social history, past surgical history, and problem list      Subjective:     Patient ID: Yakov Mccabe is a 55 y o  female  HPI    Right shoulder pain: Started in august  Thinks she had lifted a crate and that was when it started  Constant  The lateral shoulder  Shoulder pain is worse with lifting and sitting for a long time, moving around  Alleviating factors: none apart from relaxing   Dr Shannon Gao gave her steroids and she reports she can lift the arm now but the pain has not changed  5/10  Worst it is 7/10  Has not been moving it much lately due to fear of having pain  Denies prviously injuries arm/shoulder  No previous physical therapy  No ortho evaluation  HLD: takes atorvastatin maybe twice weekly as she thinks she might be getting nauseous from it  We discussed the importance of the medicine given she is a diabetic  Will switch to crestor 10 mg and see if she has improvement  Advised to take in morning with other meds to ensure she is compliant  DM: not currently on medications  Does not follow diabetic diet  Has not had labs checked in over 1 yr due to being scared of needles  Advised importance of rechecking blood work  If not done at next visit for diabetes in 1 month will do poct  She reports she had brain surgery last year and was told by her neurologist to see a eye specialist in [place] so she declines ophthalmology referral at this time  She has not made an appt so importance of diabetic eye stressed to her  Verbalized understanding      Lab Results   Component Value Date    HGBA1C 6 2 10/30/2019         PHQ-9 Follow-up    Little interest or pleasure in doing things: 0 - not at all  Feeling down, depressed, or hopeless: 0 - not at all  PHQ-2 Score: 0          Current Outpatient Medications on File Prior to Visit   Medication Sig Dispense Refill    cephalexin (KEFLEX) 500 mg capsule Take 1 capsule (500 mg total) by mouth every 12 (twelve) hours for 5 days 10 capsule 0    hydrochlorothiazide (HYDRODIURIL) 12 5 mg tablet TAKE 1 TABLET BY MOUTH EVERY DAY 90 tablet 1    metoprolol succinate (TOPROL-XL) 100 mg 24 hr tablet TAKE 1 TABLET BY MOUTH EVERY DAY 90 tablet 1    [DISCONTINUED] atorvastatin (LIPITOR) 20 mg tablet Take 1 tablet (20 mg total) by mouth daily 30 tablet 6    [DISCONTINUED] methylPREDNISolone 4 MG tablet therapy pack Use as directed on package (Patient not taking: Reported on 9/21/2021) 21 each 0    [DISCONTINUED] omeprazole (PriLOSEC) 40 MG capsule Take 1 capsule (40 mg total) by mouth daily (Patient not taking: Reported on 4/20/2021) 30 capsule 0     No current facility-administered medications on file prior to visit  Review of Systems       Objective:    Vitals:    09/21/21 0811   BP: 124/72   BP Location: Left arm   Patient Position: Sitting   Pulse: 55   Temp: (!) 96 9 °F (36 1 °C)   TempSrc: Tympanic   SpO2: 95%   Weight: 87 4 kg (192 lb 9 6 oz)   Height: 5' 3" (1 6 m)         Physical Exam  Vitals and nursing note reviewed  Constitutional:       General: She is not in acute distress  Appearance: Normal appearance  She is not ill-appearing  HENT:      Head: Normocephalic and atraumatic  Right Ear: Tympanic membrane, ear canal and external ear normal       Left Ear: Tympanic membrane, ear canal and external ear normal       Nose: Nose normal       Mouth/Throat:      Mouth: Mucous membranes are moist       Pharynx: Oropharynx is clear  No oropharyngeal exudate or posterior oropharyngeal erythema  Eyes:      Extraocular Movements: Extraocular movements intact  Conjunctiva/sclera: Conjunctivae normal       Pupils: Pupils are equal, round, and reactive to light  Cardiovascular:      Rate and Rhythm: Normal rate and regular rhythm  Pulses: Normal pulses  Heart sounds: Normal heart sounds  No murmur heard  No friction rub  No gallop  Pulmonary:      Effort: Pulmonary effort is normal  No respiratory distress  Breath sounds: Normal breath sounds  No wheezing or rales  Abdominal:      General: Bowel sounds are normal  There is no distension  Palpations: Abdomen is soft  Tenderness: There is no abdominal tenderness  There is no guarding  Musculoskeletal:      Right shoulder: No swelling, effusion, tenderness or bony tenderness  Decreased range of motion (2/2 to pain AROM limited, passive intact)  Decreased strength (2/2 to fear of pain)        Left shoulder: Normal       Cervical back: Neck supple  No muscular tenderness  Lumbar back: Tenderness (paraspinal lower lumbar bilateral) present  Right lower leg: No edema  Left lower leg: No edema  Lymphadenopathy:      Cervical: No cervical adenopathy  Skin:     General: Skin is warm  Neurological:      General: No focal deficit present  Mental Status: She is alert and oriented to person, place, and time

## 2021-09-21 NOTE — PATIENT INSTRUCTIONS

## 2021-09-21 NOTE — PROGRESS NOTES
[facility]    NAME: Neisha Luciano  AGE: 55 y o  SEX: female  : 1965     DATE: 2021     Assessment and Plan:     Problem List Items Addressed This Visit        Endocrine    Type 2 diabetes mellitus without complication, without long-term current use of insulin (HCC)    Relevant Medications    rosuvastatin (CRESTOR) 10 MG tablet    Other Relevant Orders    Microalbumin / creatinine urine ratio ([facility])    Hemoglobin A1C ([facility])      Other Visit Diagnoses     Annual physical exam    -  Primary    Need for hepatitis C screening test        Relevant Orders    Hepatitis C Antibody ([facility])    Encounter for immunization        Screening for HIV (human immunodeficiency virus)        Relevant Orders    HIV 1/2 Antigen/Antibody (4th Generation) w Reflex [facility]    Screening for colorectal cancer        Relevant Orders    Cologuard    Screening for cervical cancer        Relevant Orders    Ambulatory referral to Gynecology    Acute pain of right shoulder        Relevant Medications    naproxen (NAPROSYN) 500 mg tablet    Other Relevant Orders    Ambulatory referral to Physical Therapy          Immunizations and preventive care screenings were discussed with patient today  Appropriate education was printed on patient's after visit summary  Counseling:  Alcohol/drug use: discussed moderation in alcohol intake, the recommendations for healthy alcohol use, and avoidance of illicit drug use  Dental Health: discussed importance of regular tooth brushing, flossing, and dental visits  Injury prevention: discussed safety/seat belts, safety helmets, smoke detectors, carbon dioxide detectors, and smoking near bedding or upholstery  Sexual health: discussed sexually transmitted diseases, partner selection, use of condoms, avoidance of unintended pregnancy, and contraceptive alternatives    · Exercise: the importance of regular exercise/physical activity was discussed  Recommend exercise 3-5 times per week for at least 30 minutes  Return in 4 weeks (on 10/19/2021) for DM visit  Chief Complaint:     Chief Complaint   Patient presents with    Physical Exam     forms to fill out    Arm Pain     right arm pain for about 3 weeks has seen Dr Chinyere Martini for this in past but still no better       History of Present Illness:     Adult Annual Physical   Patient here for a comprehensive physical exam  The patient reports problems - rigth arm pain  Diet and Physical Activity  · Diet/Nutrition: poor diet and limited fruits/vegetables  · Exercise: no formal exercise and active at work         Depression Screening  PHQ-9 Depression Screening    PHQ-9:   Frequency of the following problems over the past two weeks:      Little interest or pleasure in doing things: 0 - not at all  Feeling down, depressed, or hopeless: 0 - not at all  PHQ-2 Score: 0       General Health  · Sleep: sleeps well and gets 7-8 hours of sleep on average  · Hearing: normal - bilateral   · Vision: most recent eye exam >1 year ago and wears glasses  Blurry vision with near distance  · Dental: regular dental visits, brushes teeth twice daily and flosses teeth occasionally         /GYN Health  · Patient is: postmenopausal  · Last menstrual period: 46s  · Contraceptive method: NA      Review of Systems:     Review of Systems      Past Medical History:     Past Medical History:   Diagnosis Date    Compression of optic chiasm 2019    GERD (gastroesophageal reflux disease)     Hypercholesteremia     Hypertension     Pituitary tumor       Past Surgical History:     Past Surgical History:   Procedure Laterality Date    BRAIN SURGERY       SECTION      MA NEUROENDOSCOP,EXC,PIT ESPERANZA,TRANSNAS/SPHEN N/A 2019    Procedure: Image guided endoscopic transnasal transsphenoidal resection of pituitary mass, abdominal fat graft, possible lumbar drain;  Surgeon: Kymberly Ibanez MD;  [facility];  Service: Neurosurgery    TRANSPHENOIDAL / TRANSNASAL HYPOPHYSECTOMY / RESECTION PITUITARY TUMOR        Social History:     Social History     Socioeconomic History    Marital status: Single     Spouse name: None    Number of children: 1    Years of education: 15    Highest education level: None   Occupational History    Occupation: [employer]     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   Tobacco Use    Smoking status: Never Smoker    Smokeless tobacco: Never Used   Vaping Use    Vaping Use: Never used   Substance and Sexual Activity    Alcohol use: Not Currently    Drug use: Not Currently    Sexual activity: None   Other Topics Concern    None   Social History Narrative    Lives with family     Social Determinants of Health     Financial Resource Strain: Low Risk     Difficulty of Paying Living Expenses: Not hard at all   Food Insecurity: No Food Insecurity    Worried About Running Out of Food in the Last Year: Never true    Ryan of Food in the Last Year: Never true   Transportation Needs: No Transportation Needs    Lack of Transportation (Medical): No    Lack of Transportation (Non-Medical):  No   Physical Activity:     Days of Exercise per Week:     Minutes of Exercise per Session:    Stress:     Feeling of Stress :    Social Connections:     Frequency of Communication with Friends and Family:     Frequency of Social Gatherings with Friends and Family:     Attends Congregation Services:     Active Member of Clubs or Organizations:     Attends Club or Organization Meetings:     Marital Status:    Intimate Partner Violence:     Fear of Current or Ex-Partner:     Emotionally Abused:     Physically Abused:     Sexually Abused:       Family History:     Family History   Problem Relation Age of Onset    Cancer Mother     Heart disease Father     Hypertension Father     No Known Problems Daughter     No Known Problems Maternal Aunt     Leukemia Brother     No Known Problems Maternal Grandmother     No Known Problems Paternal Grandmother     No Known Problems Maternal Aunt     No Known Problems Paternal Aunt     No Known Problems Paternal Aunt       Current Medications:     Current Outpatient Medications   Medication Sig Dispense Refill    cephalexin (KEFLEX) 500 mg capsule Take 1 capsule (500 mg total) by mouth every 12 (twelve) hours for 5 days 10 capsule 0    hydrochlorothiazide (HYDRODIURIL) 12 5 mg tablet TAKE 1 TABLET BY MOUTH EVERY DAY 90 tablet 1    metoprolol succinate (TOPROL-XL) 100 mg 24 hr tablet TAKE 1 TABLET BY MOUTH EVERY DAY 90 tablet 1    naproxen (NAPROSYN) 500 mg tablet Take 1 tablet (500 mg total) by mouth 2 (two) times a day with meals for 7 days 14 tablet 0    rosuvastatin (CRESTOR) 10 MG tablet Take 1 tablet (10 mg total) by mouth daily 30 tablet 5     No current facility-administered medications for this visit  Allergies:     No Known Allergies   Physical Exam:     /72 (BP Location: Left arm, Patient Position: Sitting)   Pulse 55   Temp (!) 96 9 °F (36 1 °C) (Tympanic)   Ht 5' 3" (1 6 m)   Wt 87 4 kg (192 lb 9 6 oz)   SpO2 95%   BMI 34 12 kg/m²     Physical Exam   Vitals and nursing note reviewed  Constitutional:       General: She is not in acute distress  Appearance: Normal appearance  She is not ill-appearing  HENT:      Head: Normocephalic and atraumatic  Right Ear: Tympanic membrane, ear canal and external ear normal       Left Ear: Tympanic membrane, ear canal and external ear normal       Nose: Nose normal       Mouth/Throat:      Mouth: Mucous membranes are moist       Pharynx: Oropharynx is clear  No oropharyngeal exudate or posterior oropharyngeal erythema  Eyes:      Extraocular Movements: Extraocular movements intact  Conjunctiva/sclera: Conjunctivae normal       Pupils: Pupils are equal, round, and reactive to light     Cardiovascular:      Rate and Rhythm: Normal rate and regular rhythm  Pulses: Normal pulses  Heart sounds: Normal heart sounds  No murmur heard  No friction rub  No gallop  Pulmonary:      Effort: Pulmonary effort is normal  No respiratory distress  Breath sounds: Normal breath sounds  No wheezing or rales  Abdominal:      General: Bowel sounds are normal  There is no distension  Palpations: Abdomen is soft  Tenderness: There is no abdominal tenderness  There is no guarding  Musculoskeletal:      Right shoulder: No swelling, effusion, tenderness or bony tenderness  Decreased range of motion (2/2 to pain AROM limited, passive intact)  Decreased strength (2/2 to fear of pain)  Left shoulder: Normal       Cervical back: Neck supple  No muscular tenderness  Lumbar back: Tenderness (paraspinal lower lumbar bilateral) present  Right lower leg: No edema  Left lower leg: No edema  Lymphadenopathy:      Cervical: No cervical adenopathy  Skin:     General: Skin is warm  Neurological:      General: No focal deficit present  Mental Status: She is alert and oriented to person, place, and time       Yanelis Jacobo MD  [address] [facility]

## 2021-09-28 NOTE — TELEPHONE ENCOUNTER
9/28/21 PT CALLED AND ASKED FOR NAME OF HER EYE DR TO CALL AND MAKE APPT  PER [facility] LAST OFFICE NOTE, PT SAW [facility], GAVE NUMBER FOR DR Annetta Herrera  PT WILL CALL TO SCHEDULE RETINAL OCT AND VF  SHE HAD BRAIN MRI PIT 6/2021  ONCE SHE HAS APPTS FOR VF AND OCT SHE CAN CALL BACK FOR F/U APPT WITH [facility] ([facility])

## 2021-10-11 ENCOUNTER — HOSPITAL ENCOUNTER (OUTPATIENT)
Dept: RADIOLOGY | Facility: HOSPITAL | Age: 56
Discharge: HOME | End: 2021-10-11
Attending: SPECIALIST
Payer: COMMERCIAL

## 2021-10-11 DIAGNOSIS — M25.511 RIGHT SHOULDER PAIN, UNSPECIFIED CHRONICITY: ICD-10-CM

## 2021-10-11 PROCEDURE — 73030 X-RAY EXAM OF SHOULDER: CPT

## 2021-10-19 ENCOUNTER — TRANSCRIBE ORDERS (OUTPATIENT)
Dept: FAMILY MEDICINE CLINIC | Facility: CLINIC | Age: 56
End: 2021-10-19

## 2021-10-19 DIAGNOSIS — Z02.89 ENCOUNTER FOR PHYSICAL EXAMINATION RELATED TO EMPLOYMENT: Primary | ICD-10-CM

## 2021-10-19 LAB
ALBUMIN/CREAT UR: 4 MCG/MG CREAT
CREAT UR-MCNC: 150 MG/DL (ref 20–275)
HBA1C MFR BLD: 6 % OF TOTAL HGB
HCV AB S/CO SERPL IA: 0.03
HCV AB SERPL QL IA: NORMAL
HIV 1+2 AB+HIV1 P24 AG SERPL QL IA: NORMAL
MICROALBUMIN UR-MCNC: 0.6 MG/DL

## 2021-10-19 PROCEDURE — 3061F NEG MICROALBUMINURIA REV: CPT | Performed by: FAMILY MEDICINE

## 2021-10-19 PROCEDURE — 3044F HG A1C LEVEL LT 7.0%: CPT | Performed by: FAMILY MEDICINE

## 2021-10-23 LAB
M TB IFN-G CD4+ BCKGRND COR BLD-ACNC: 0.06 IU/ML
M TB IFN-G CD4+ BCKGRND COR BLD-ACNC: 0.09 IU/ML
M TB IFN-G CD4+ T-CELLS BLD-ACNC: 0.02 IU/ML
M TB TUBERC IFN-G BLD QL: NEGATIVE
M TB TUBERC IGNF/MITOGEN IGNF CONTROL: >10 IU/ML

## 2021-10-29 ENCOUNTER — OFFICE VISIT (OUTPATIENT)
Dept: FAMILY MEDICINE CLINIC | Facility: CLINIC | Age: 56
End: 2021-10-29
Payer: COMMERCIAL

## 2021-10-29 VITALS
DIASTOLIC BLOOD PRESSURE: 72 MMHG | OXYGEN SATURATION: 96 % | HEART RATE: 98 BPM | HEIGHT: 63 IN | SYSTOLIC BLOOD PRESSURE: 126 MMHG | BODY MASS INDEX: 34.41 KG/M2 | TEMPERATURE: 97.6 F | WEIGHT: 194.2 LBS

## 2021-10-29 DIAGNOSIS — Z23 ENCOUNTER FOR IMMUNIZATION: ICD-10-CM

## 2021-10-29 DIAGNOSIS — I10 ESSENTIAL HYPERTENSION: ICD-10-CM

## 2021-10-29 DIAGNOSIS — R73.03 PREDIABETES: Primary | ICD-10-CM

## 2021-10-29 DIAGNOSIS — E78.2 MIXED HYPERLIPIDEMIA: ICD-10-CM

## 2021-10-29 PROBLEM — E11.9 TYPE 2 DIABETES MELLITUS WITHOUT COMPLICATION, WITHOUT LONG-TERM CURRENT USE OF INSULIN (HCC): Status: RESOLVED | Noted: 2021-08-17 | Resolved: 2021-10-29

## 2021-10-29 PROCEDURE — 3725F SCREEN DEPRESSION PERFORMED: CPT | Performed by: FAMILY MEDICINE

## 2021-10-29 PROCEDURE — 3074F SYST BP LT 130 MM HG: CPT | Performed by: FAMILY MEDICINE

## 2021-10-29 PROCEDURE — 3078F DIAST BP <80 MM HG: CPT | Performed by: FAMILY MEDICINE

## 2021-10-29 PROCEDURE — 99214 OFFICE O/P EST MOD 30 MIN: CPT | Performed by: FAMILY MEDICINE

## 2021-10-29 PROCEDURE — 3008F BODY MASS INDEX DOCD: CPT | Performed by: FAMILY MEDICINE

## 2021-10-29 PROCEDURE — 1036F TOBACCO NON-USER: CPT | Performed by: FAMILY MEDICINE

## 2021-11-03 ENCOUNTER — CLINICAL SUPPORT (OUTPATIENT)
Dept: FAMILY MEDICINE CLINIC | Facility: CLINIC | Age: 56
End: 2021-11-03
Payer: COMMERCIAL

## 2021-11-03 DIAGNOSIS — Z23 ENCOUNTER FOR IMMUNIZATION: Primary | ICD-10-CM

## 2021-11-03 PROCEDURE — 90750 HZV VACC RECOMBINANT IM: CPT

## 2021-11-03 PROCEDURE — 90471 IMMUNIZATION ADMIN: CPT

## 2021-11-08 ENCOUNTER — EVALUATION (OUTPATIENT)
Dept: PHYSICAL THERAPY | Facility: CLINIC | Age: 56
End: 2021-11-08
Payer: COMMERCIAL

## 2021-11-08 DIAGNOSIS — M25.511 ACUTE PAIN OF RIGHT SHOULDER: Primary | ICD-10-CM

## 2021-11-08 PROCEDURE — 97162 PT EVAL MOD COMPLEX 30 MIN: CPT | Performed by: PHYSICAL THERAPIST

## 2021-11-11 ENCOUNTER — OFFICE VISIT (OUTPATIENT)
Dept: PHYSICAL THERAPY | Facility: CLINIC | Age: 56
End: 2021-11-11
Payer: COMMERCIAL

## 2021-11-11 DIAGNOSIS — M25.511 ACUTE PAIN OF RIGHT SHOULDER: Primary | ICD-10-CM

## 2021-11-11 PROCEDURE — 97110 THERAPEUTIC EXERCISES: CPT | Performed by: PHYSICAL THERAPIST

## 2021-11-15 ENCOUNTER — OFFICE VISIT (OUTPATIENT)
Dept: PHYSICAL THERAPY | Facility: CLINIC | Age: 56
End: 2021-11-15
Payer: COMMERCIAL

## 2021-11-15 DIAGNOSIS — M25.511 ACUTE PAIN OF RIGHT SHOULDER: Primary | ICD-10-CM

## 2021-11-15 PROCEDURE — 97110 THERAPEUTIC EXERCISES: CPT | Performed by: PHYSICAL THERAPIST

## 2021-11-15 PROCEDURE — 97140 MANUAL THERAPY 1/> REGIONS: CPT | Performed by: PHYSICAL THERAPIST

## 2021-11-18 ENCOUNTER — OFFICE VISIT (OUTPATIENT)
Dept: PHYSICAL THERAPY | Facility: CLINIC | Age: 56
End: 2021-11-18
Payer: COMMERCIAL

## 2021-11-18 DIAGNOSIS — M25.511 ACUTE PAIN OF RIGHT SHOULDER: Primary | ICD-10-CM

## 2021-11-18 PROCEDURE — 97140 MANUAL THERAPY 1/> REGIONS: CPT | Performed by: PHYSICAL THERAPIST

## 2021-11-18 PROCEDURE — 97110 THERAPEUTIC EXERCISES: CPT | Performed by: PHYSICAL THERAPIST

## 2021-11-18 PROCEDURE — 97112 NEUROMUSCULAR REEDUCATION: CPT | Performed by: PHYSICAL THERAPIST

## 2021-11-22 ENCOUNTER — IMMUNIZATIONS (OUTPATIENT)
Dept: FAMILY MEDICINE CLINIC | Facility: HOSPITAL | Age: 56
End: 2021-11-22

## 2021-11-22 ENCOUNTER — OFFICE VISIT (OUTPATIENT)
Dept: PHYSICAL THERAPY | Facility: CLINIC | Age: 56
End: 2021-11-22
Payer: COMMERCIAL

## 2021-11-22 DIAGNOSIS — Z23 ENCOUNTER FOR IMMUNIZATION: Primary | ICD-10-CM

## 2021-11-22 DIAGNOSIS — M25.511 ACUTE PAIN OF RIGHT SHOULDER: Primary | ICD-10-CM

## 2021-11-22 PROCEDURE — 91306 COVID-19 MODERNA VACC 0.25 ML BOOSTER: CPT

## 2021-11-22 PROCEDURE — 97140 MANUAL THERAPY 1/> REGIONS: CPT | Performed by: PHYSICAL THERAPIST

## 2021-11-22 PROCEDURE — 97110 THERAPEUTIC EXERCISES: CPT | Performed by: PHYSICAL THERAPIST

## 2021-11-22 PROCEDURE — 0064A COVID-19 MODERNA VACC 0.25 ML BOOSTER: CPT

## 2021-11-24 ENCOUNTER — OFFICE VISIT (OUTPATIENT)
Dept: PHYSICAL THERAPY | Facility: CLINIC | Age: 56
End: 2021-11-24
Payer: COMMERCIAL

## 2021-11-24 DIAGNOSIS — M25.511 ACUTE PAIN OF RIGHT SHOULDER: Primary | ICD-10-CM

## 2021-11-24 PROCEDURE — 97140 MANUAL THERAPY 1/> REGIONS: CPT | Performed by: PHYSICAL THERAPIST

## 2021-11-24 PROCEDURE — 97110 THERAPEUTIC EXERCISES: CPT | Performed by: PHYSICAL THERAPIST

## 2021-11-29 ENCOUNTER — APPOINTMENT (OUTPATIENT)
Dept: PHYSICAL THERAPY | Facility: CLINIC | Age: 56
End: 2021-11-29
Payer: COMMERCIAL

## 2021-11-30 ENCOUNTER — OFFICE VISIT (OUTPATIENT)
Dept: PHYSICAL THERAPY | Facility: CLINIC | Age: 56
End: 2021-11-30
Payer: COMMERCIAL

## 2021-11-30 DIAGNOSIS — M25.511 ACUTE PAIN OF RIGHT SHOULDER: Primary | ICD-10-CM

## 2021-11-30 PROCEDURE — 97110 THERAPEUTIC EXERCISES: CPT

## 2021-11-30 PROCEDURE — 97140 MANUAL THERAPY 1/> REGIONS: CPT

## 2021-12-02 ENCOUNTER — OFFICE VISIT (OUTPATIENT)
Dept: PHYSICAL THERAPY | Facility: CLINIC | Age: 56
End: 2021-12-02
Payer: COMMERCIAL

## 2021-12-02 DIAGNOSIS — M25.511 ACUTE PAIN OF RIGHT SHOULDER: Primary | ICD-10-CM

## 2021-12-02 PROCEDURE — 97140 MANUAL THERAPY 1/> REGIONS: CPT

## 2021-12-02 PROCEDURE — 97110 THERAPEUTIC EXERCISES: CPT

## 2021-12-06 ENCOUNTER — OFFICE VISIT (OUTPATIENT)
Dept: PHYSICAL THERAPY | Facility: CLINIC | Age: 56
End: 2021-12-06
Payer: COMMERCIAL

## 2021-12-06 DIAGNOSIS — M25.511 ACUTE PAIN OF RIGHT SHOULDER: Primary | ICD-10-CM

## 2021-12-06 PROCEDURE — 97140 MANUAL THERAPY 1/> REGIONS: CPT

## 2021-12-06 PROCEDURE — 97110 THERAPEUTIC EXERCISES: CPT

## 2021-12-09 ENCOUNTER — EVALUATION (OUTPATIENT)
Dept: PHYSICAL THERAPY | Facility: CLINIC | Age: 56
End: 2021-12-09
Payer: COMMERCIAL

## 2021-12-09 DIAGNOSIS — M25.511 ACUTE PAIN OF RIGHT SHOULDER: Primary | ICD-10-CM

## 2021-12-09 PROCEDURE — 97110 THERAPEUTIC EXERCISES: CPT | Performed by: PHYSICAL THERAPIST

## 2021-12-13 ENCOUNTER — OFFICE VISIT (OUTPATIENT)
Dept: PHYSICAL THERAPY | Facility: CLINIC | Age: 56
End: 2021-12-13
Payer: COMMERCIAL

## 2021-12-13 DIAGNOSIS — M25.511 ACUTE PAIN OF RIGHT SHOULDER: Primary | ICD-10-CM

## 2021-12-13 PROCEDURE — 97110 THERAPEUTIC EXERCISES: CPT | Performed by: PHYSICAL THERAPIST

## 2021-12-13 PROCEDURE — 97140 MANUAL THERAPY 1/> REGIONS: CPT | Performed by: PHYSICAL THERAPIST

## 2021-12-16 ENCOUNTER — OFFICE VISIT (OUTPATIENT)
Dept: PHYSICAL THERAPY | Facility: CLINIC | Age: 56
End: 2021-12-16
Payer: COMMERCIAL

## 2021-12-16 DIAGNOSIS — M25.511 ACUTE PAIN OF RIGHT SHOULDER: Primary | ICD-10-CM

## 2021-12-16 PROCEDURE — 97110 THERAPEUTIC EXERCISES: CPT | Performed by: PHYSICAL THERAPIST

## 2021-12-20 ENCOUNTER — OFFICE VISIT (OUTPATIENT)
Dept: PHYSICAL THERAPY | Facility: CLINIC | Age: 56
End: 2021-12-20
Payer: COMMERCIAL

## 2021-12-20 DIAGNOSIS — M25.511 ACUTE PAIN OF RIGHT SHOULDER: Primary | ICD-10-CM

## 2021-12-20 PROCEDURE — 97110 THERAPEUTIC EXERCISES: CPT | Performed by: PHYSICAL THERAPIST

## 2021-12-20 PROCEDURE — 97140 MANUAL THERAPY 1/> REGIONS: CPT | Performed by: PHYSICAL THERAPIST

## 2021-12-27 ENCOUNTER — APPOINTMENT (OUTPATIENT)
Dept: PHYSICAL THERAPY | Facility: CLINIC | Age: 56
End: 2021-12-27
Payer: COMMERCIAL

## 2021-12-27 NOTE — PROGRESS NOTES
Daily Note     Today's date: 2021  Patient name: Erica Carr  : 1965  MRN: [id number]  Referring provider: Nancy England MD  Dx:   Encounter Diagnosis     ICD-10-CM    1  Acute pain of right shoulder  M25 511                   Subjective: ***      Objective: See treatment diary below      Assessment: Tolerated treatment well  Patient demonstrated fatigue post treatment and would benefit from continued PT  Plan: Continue per plan of care  Progress treatment as tolerated  Precautions: HTN       Manuals    R shoulder PROM GR   JF GR GR GF SC SC    Gr  II-III GHJ mobs GR   JF  GR GF      UT TPR GR         NV   R UT str  GR         NV   Reassessment          GR   Neuro Re-Ed                                                                                                        Ther Ex             UBE 4' / 4' 4' / 4'  3' / 3' 3' / 3' 3' / 3' 3' / 3' 4'/'4' 4'/4' 4' / 4'   Pulleys    15x5" D/C        Wall ladder: F only 10"x5 10"x5  10x5" 10x5" 10x5" 10x5" 10x 5"  10" x 5  10"x5   Webslide: rows    RTB 2x10 3"         ER - s/l    2x10         TB ER    RTB 2x10         TB IR    RTB 2x10         R UT str  Scaption wall slide     YTB 2x10        Wall clock     2x5 1#        Scaption with DB     2x10 1#  2x10 1# 2x 10 1#      Prone shoulder ext  10x 3"  10x 3" 2x 10 3"  2x 10     Prone h  abd      10x 3"  10x 3" 2 x 10  2x 10     Prone rows  2x 10     Theracane TPR R UT 5 min            Theracane R UT MWM 10x3"            PNF D2 UE flex YTB 2x10            Multidirectional ball stability on wall  GMB 20x ea              Scaption wall slides RTB 2x10            Ther Activity                                       Gait Training                                       Modalities

## 2021-12-28 ENCOUNTER — OFFICE VISIT (OUTPATIENT)
Dept: PHYSICAL THERAPY | Facility: CLINIC | Age: 56
End: 2021-12-28
Payer: COMMERCIAL

## 2021-12-28 DIAGNOSIS — M25.511 ACUTE PAIN OF RIGHT SHOULDER: Primary | ICD-10-CM

## 2021-12-28 PROCEDURE — 97110 THERAPEUTIC EXERCISES: CPT

## 2021-12-28 PROCEDURE — 97140 MANUAL THERAPY 1/> REGIONS: CPT

## 2021-12-29 NOTE — PROGRESS NOTES
Daily Note     Today's date: 2021  Patient name: Aron Larios  : 1965  MRN: [id number]  Referring provider: Sherry Oconnell MD  Dx:   Encounter Diagnosis     ICD-10-CM    1  Acute pain of right shoulder  M25 511                   Subjective: ***      Objective: See treatment diary below      Assessment: Tolerated treatment well  Patient demonstrated fatigue post treatment and would benefit from continued PT  Plan: Continue per plan of care  Progress treatment as tolerated  Precautions: HTN       Manuals    R shoulder PROM GR  GR BE  GR GF SC SC    Gr  II-III GHJ mobs GR  GR BE  GR GF      UT TPR GR   BE      NV   R UT str  GR   BE      NV   Reassessment          GR   Neuro Re-Ed                                                                                                        Ther Ex             UBE 4' / 4' 4' / 4' 4' / 4' 4'/4'  3' / 3' 3' / 3' 4'/'4' 4'/4' 4' / 4'   Pulleys             Wall ladder: F only 10"x5 10"x5 10"x5 10"x5  10x5" 10x5" 10x 5"  10" x 5  10"x5   Webslide: rows    GTB  2x10         ER - s/l             TB ER             TB IR             R UT str  Scaption wall slide             Wall clock             Scaption with DB       2x10 1# 2x 10 1#      Prone shoulder ext  10x 3" 2x 10 3"  2x 10     Prone h  abd        10x 3" 2 x 10  2x 10     Prone rows  2x 10     Theracane TPR R UT 5 min            Theracane R UT MWM 10x3"            PNF D2 UE flex YTB 2x10  YTB 2x10 YTB  2x10         Multidirectional ball stability on wall  GMB 20x ea  GMB 20x ea   GMB  20x ea          Scaption wall slides RTB 2x10   RTB  2x10         Ther Activity                                       Gait Training                                       Modalities

## 2021-12-30 ENCOUNTER — APPOINTMENT (OUTPATIENT)
Dept: PHYSICAL THERAPY | Facility: CLINIC | Age: 56
End: 2021-12-30
Payer: COMMERCIAL

## 2022-01-05 ENCOUNTER — APPOINTMENT (OUTPATIENT)
Dept: PHYSICAL THERAPY | Facility: CLINIC | Age: 57
End: 2022-01-05
Payer: COMMERCIAL

## 2022-01-05 NOTE — PROGRESS NOTES
Daily Note     Today's date: 2022  Patient name: Maia Lopez  : 1965  MRN: [id number]  Referring provider: Jose Raul Salas MD  Dx:   Encounter Diagnosis     ICD-10-CM    1  Acute pain of right shoulder  M25 511                   Subjective: ***      Objective: See treatment diary below      Assessment: Tolerated treatment well  Patient demonstrated fatigue post treatment and would benefit from continued PT  Plan: Continue per plan of care  Progress treatment as tolerated  Precautions: HTN       Manuals    R shoulder PROM GR  GR BE  GR GF SC SC    Gr  II-III GHJ mobs GR  GR BE  GR GF      UT TPR GR   BE      NV   R UT str  GR   BE      NV   Reassessment          GR   Neuro Re-Ed                                                                                                        Ther Ex             UBE 4' / 4' 4' / 4' 4' / 4' 4'/4'  3' / 3' 3' / 3' 4'/'4' 4'/4' 4' / 4'   Pulleys             Wall ladder: F only 10"x5 10"x5 10"x5 10"x5  10x5" 10x5" 10x 5"  10" x 5  10"x5   Webslide: rows    GTB  2x10         ER - s/l             TB ER             TB IR             R UT str  Scaption wall slide             Wall clock             Scaption with DB       2x10 1# 2x 10 1#      Prone shoulder ext  10x 3" 2x 10 3"  2x 10     Prone h  abd        10x 3" 2 x 10  2x 10     Prone rows  2x 10     Theracane TPR R UT 5 min            Theracane R UT MWM 10x3"            PNF D2 UE flex YTB 2x10  YTB 2x10 YTB  2x10         Multidirectional ball stability on wall  GMB 20x ea  GMB 20x ea   GMB  20x ea          Scaption wall slides RTB 2x10   RTB  2x10         Ther Activity                                       Gait Training                                       Modalities

## 2022-01-12 ENCOUNTER — OFFICE VISIT (OUTPATIENT)
Dept: PHYSICAL THERAPY | Facility: CLINIC | Age: 57
End: 2022-01-12
Payer: COMMERCIAL

## 2022-01-12 DIAGNOSIS — M25.511 ACUTE PAIN OF RIGHT SHOULDER: Primary | ICD-10-CM

## 2022-01-12 PROCEDURE — 97140 MANUAL THERAPY 1/> REGIONS: CPT

## 2022-01-12 PROCEDURE — 97110 THERAPEUTIC EXERCISES: CPT

## 2022-01-12 NOTE — PROGRESS NOTES
Daily Note     Today's date: 2022  Patient name: Roly Burton  : 1965  MRN: [id number]  Referring provider: Janice Soto MD  Dx:   Encounter Diagnosis     ICD-10-CM    1  Acute pain of right shoulder  M25 511                   Subjective: Patient notes that her shoulder has improved and feels pain in it only occasionally  Objective: See treatment diary below      Assessment: Tolerated treatment well  Patient with increased difficulty recruiting scapular stabilization muscles with prone exercises and required tactile/verbal cues to complete  Continues with significant TTP in R UT  At end of treatment patient noted feeling sore and attributed this to being off from therapy for 2 weeks, therefore educated on DOMS  Patient demonstrated fatigue post treatment, exhibited good technique with therapeutic exercises and would benefit from continued PT  Plan: Continue per plan of care  Progress treatment as tolerated  Precautions: HTN       Manuals    R shoulder PROM GR  GR BE BE GR GF SC SC    Gr  II-III GHJ mobs GR  GR BE BE GR GF      UT TPR GR   BE BE     NV   R UT str  GR   BE      NV   Reassessment          GR   Neuro Re-Ed                                                                                                        Ther Ex             UBE 4' / 4' 4' / 4' 4' / 4' 4'/4' 4'/4' 3' / 3' 3' / 3' 4'/'4' 4'/4' 4' / 4'   Pulleys             Wall ladder: F only 10"x5 10"x5 10"x5 10"x5 10"x5 10x5" 10x5" 10x 5"  10" x 5  10"x5   Webslide: rows    GTB  2x10 GTB  2x10        ER - s/l             TB ER             TB IR             R UT str  Scaption wall slide             Wall clock             Scaption with DB       2x10 1# 2x 10 1#      Prone shoulder ext  2x10  10x 3" 2x 10 3"  2x 10     Prone h  abd      2x10  10x 3" 2 x 10  2x 10     Prone rows            2x 10     Theracane TPR R UT 5 min            Theracane R UT MWM 10x3"            PNF D2 UE flex YTB 2x10  YTB 2x10 YTB  2x10 YTB  2x10         Multidirectional ball stability on wall  GMB 20x ea  GMB 20x ea  GMB  20x ea  GMB  20x ea         Scaption wall slides RTB 2x10   RTB  2x10         Ther Activity                                       Gait Training                                       Modalities                                           Patient 1 on 1 on 1/12/22 for 44 minutes

## 2022-01-19 ENCOUNTER — APPOINTMENT (OUTPATIENT)
Dept: PHYSICAL THERAPY | Facility: CLINIC | Age: 57
End: 2022-01-19
Payer: COMMERCIAL

## 2022-01-19 NOTE — PROGRESS NOTES
PT Re-Evaluation     Today's date: 2022  Patient name: Mary Germain  : 1965  MRN: [id number]  Referring provider: Trey Parra MD  Dx:   Encounter Diagnosis     ICD-10-CM    1  Acute pain of right shoulder  M25 511                   Assessment  Assessment details: Since beginning physical therapy, William Sanchez has attended a total number of 15 visits and has maintained excellent compliance with established POC  Patient has made significant improvements in all areas, including decreased pain, increased strength, increased ROM, improved flexibility, improved joint mobility, improved postural awareness and improved overall level of function  Patient is reporting improved ability to perform a/iadls, recreational activities, work-related activities and engaging in social activities  Despite these improvements, Patient continues to present with pain, decreased strength, decreased ROM, decreased joint mobility and postural  dysfunction  Patient would continue to benefit from skilled physical therapy to address her aforementioned impairments, improve their level of function and to improve their overall quality of life  Impairments: abnormal muscle firing, abnormal or restricted ROM, activity intolerance, impaired physical strength, lacks appropriate home exercise program, pain with function and poor posture   Understanding of Dx/Px/POC: good   Prognosis: good    Goals  Short Term Goals: to be achieved by 4 weeks ALL GOALS MET  1) Patient to be independent with basic HEP  2) Decrease pain to 6/10 at it's worst   3) Increase UE strength by 1/2 MMT grade in all deficient planes  4) Increase UE ROM by > 5 deg in all deficient planes  5) Patient to report decreased sleep interruption secondary to pain  Long Term Goals: to be achieved by discharge ALL GOALS PROGRESSING  1) FOTO equal to or greater than TBD  2) Patient to be independent with comprehensive HEP  3) Abolish pain for improved quality of life    4) Increase UE strength to 5/5 MMT grade in all deficient planes to improve a/iadls  5) Increase UE ROM to within 5 deg of contralateral UE to improve a/iadls  6) Patient to report no sleep interruption secondary to pain  Plan  Patient would benefit from: skilled PT  Planned modality interventions: biofeedback, cryotherapy, hydrotherapy, unattended electrical stimulation, thermotherapy: hydrocollator packs and low level laser therapy  Planned therapy interventions: activity modification, ADL retraining, behavior modification, body mechanics training, functional ROM exercises, home exercise program, IADL retraining, joint mobilization, manual therapy, massage, neuromuscular re-education, patient education, postural training, strengthening, stretching, therapeutic activities and therapeutic exercise  Frequency: 1-2x week  Duration in weeks: 4  Plan of Care beginning date: 1/19/2022  Plan of Care expiration date: 2/16/2022  Treatment plan discussed with: patient        Subjective Evaluation    Treatments  Current treatment: physical therapy  Patient Goals  Patient goal: decreased pain, improved mobility        Objective     Palpation   Left   Tenderness of the upper trapezius  Trigger point to upper trapezius  Right   Tenderness of the upper trapezius  Trigger point to upper trapezius  Tenderness     Right Shoulder  Tenderness in the [facility] joint, bicipital groove, coracoid process and supraspinatus tendon       Active Range of Motion   Left Shoulder   Flexion: 163 degrees   Abduction: 151 degrees   External rotation BTH: T4   Internal rotation BTB: T7     Right Shoulder   Flexion: 160 (+) shrug sign degrees   Abduction: 148 (+) shrug sign degrees with pain  External rotation BTH: T4 with pain  Internal rotation BTB: T7 with pain    Passive Range of Motion   Left Shoulder   Flexion: 165 degrees   Abduction: 155 degrees   External rotation 90°: 95 degrees   Internal rotation 90°: 70 degrees     Right Shoulder Flexion: 170 degrees with pain  Abduction: 170 degrees with pain  External rotation 90°: 110 degrees with pain  Internal rotation 90°: 50 degrees with pain    Joint Play   Left Shoulder  Joints within functional limits are the anterior capsule, posterior capsule and inferior capsule  Right Shoulder  Joints within functional limits are the anterior capsule, posterior capsule and inferior capsule  Strength/Myotome Testing     Left Shoulder   Normal muscle strength    Right Shoulder     Planes of Motion   Flexion: 3+ (+) pain   Abduction: 4 (+) pain   External rotation at 0°: 5   Internal rotation at 0°: 5     Right Elbow   Flexion: 5  Extension: 5    Right Wrist/Hand   Wrist extension: 5  Wrist flexion: 5             Precautions: HTN       Manuals 12/13 12/16 12/20 12/28 1/12 1/19       R shoulder PROM GR  GR BE BE        Gr  II-III GHJ mobs GR  GR BE BE        UT TPR GR   BE BE        R UT str  GR   BE         Reassessment      GR       Neuro Re-Ed                                                                                                        Ther Ex             UBE 4' / 4' 4' / 4' 4' / 4' 4'/4' 4'/4'        [employer]             Wall ladder: F only 10"x5 10"x5 10"x5 10"x5 10"x5        Webslide: rows    GTB  2x10 GTB  2x10        ER - s/l             TB ER             TB IR             R UT str  Scaption wall slide             Wall clock             Scaption with DB             Prone shoulder ext  2x10        Prone h  abd      2x10        Prone rows  Theracane TPR R UT 5 min            Theracane R UT MWM 10x3"            PNF D2 UE flex YTB 2x10  YTB 2x10 YTB  2x10 YTB  2x10         Multidirectional ball stability on wall  GMB 20x ea  GMB 20x ea   GMB  20x ea  GMB  20x ea         Scaption wall slides RTB 2x10   RTB  2x10         Ther Activity                                       Gait Training                                       Modalities

## 2022-01-20 ENCOUNTER — EVALUATION (OUTPATIENT)
Dept: PHYSICAL THERAPY | Facility: CLINIC | Age: 57
End: 2022-01-20
Payer: COMMERCIAL

## 2022-01-20 DIAGNOSIS — M25.511 ACUTE PAIN OF RIGHT SHOULDER: Primary | ICD-10-CM

## 2022-01-20 PROCEDURE — 97110 THERAPEUTIC EXERCISES: CPT | Performed by: PHYSICAL THERAPIST

## 2022-01-20 NOTE — PROGRESS NOTES
PT Re-Evaluation     Today's date: 2022  Patient name: Nathalie Posadas  : 1965  MRN: [id number]  Referring provider: Karlene Car MD  Dx:   Encounter Diagnosis     ICD-10-CM    1  Acute pain of right shoulder  M25 511        Start Time: 1808  Stop Time: 1900  Total time in clinic (min): 52 minutes    Assessment  Assessment details: Since beginning physical therapy, Rio Zuniga has attended a total number of 15 visits and has maintained excellent compliance with established POC  Patient has made significant improvements in all areas, including decreased pain, increased strength, increased ROM, improved flexibility, improved joint mobility, improved postural awareness and improved overall level of function  Patient is reporting improved ability to perform a/iadls, recreational activities, work-related activities and engaging in social activities  Despite these improvements, Patient continues to present with pain, decreased strength, decreased ROM, decreased joint mobility and postural  dysfunction  Patient would continue to benefit from skilled physical therapy to address her aforementioned impairments, improve their level of function and to improve their overall quality of life  Impairments: abnormal muscle firing, abnormal or restricted ROM, activity intolerance, impaired physical strength, lacks appropriate home exercise program, pain with function and poor posture   Understanding of Dx/Px/POC: good   Prognosis: good    Goals  Short Term Goals: to be achieved by 4 weeks ALL GOALS MET  1) Patient to be independent with basic HEP  2) Decrease pain to 6/10 at it's worst   3) Increase UE strength by 1/2 MMT grade in all deficient planes  4) Increase UE ROM by > 5 deg in all deficient planes  5) Patient to report decreased sleep interruption secondary to pain  Long Term Goals: to be achieved by discharge ALL GOALS PROGRESSING  1) FOTO equal to or greater than TBD    2) Patient to be independent with comprehensive HEP  3) Abolish pain for improved quality of life  4) Increase UE strength to 5/5 MMT grade in all deficient planes to improve a/iadls  5) Increase UE ROM to within 5 deg of contralateral UE to improve a/iadls  6) Patient to report no sleep interruption secondary to pain  Plan  Patient would benefit from: skilled PT  Planned modality interventions: biofeedback, cryotherapy, hydrotherapy, unattended electrical stimulation, thermotherapy: hydrocollator packs and low level laser therapy  Planned therapy interventions: activity modification, ADL retraining, behavior modification, body mechanics training, functional ROM exercises, home exercise program, IADL retraining, joint mobilization, manual therapy, massage, neuromuscular re-education, patient education, postural training, strengthening, stretching, therapeutic activities and therapeutic exercise  Frequency: 1-2x week  Duration in weeks: 4  Plan of Care beginning date: 2022  Plan of Care expiration date: 2022  Treatment plan discussed with: patient        Subjective Evaluation    History of Present Illness  Mechanism of injury: Patient states that her right shoulder has been very good and estimates her overall level of function to be approximately 85% of her premorbid norm  Patient finds that if she lifts something too heavy then she will sometimes aggravate her shoulder  Patient has been able to drive with both hands and use her arm for a/idls without limitation  Patient's quality of sleep is normal  Patient has been consistently performing her HEP  Patient finds that applying Mateus Noun has helped to manage her pain     Pain  Current pain ratin  At best pain ratin  At worst pain ratin  Location: right shoulder: anterior  Quality: sharp    Treatments  Current treatment: physical therapy  Patient Goals  Patient goal: decreased pain, improved mobility        Objective     Palpation   Left   Tenderness of the upper trapezius  Trigger point to upper trapezius  Right   Tenderness of the upper trapezius  Trigger point to upper trapezius  Tenderness     Right Shoulder  Tenderness in the [facility] joint, bicipital groove, coracoid process and supraspinatus tendon  Active Range of Motion   Left Shoulder   Flexion: 163 degrees   Abduction: 151 degrees   External rotation BTH: T4   Internal rotation BTB: T7     Right Shoulder   Flexion: 160 (+) shrug sign degrees   Abduction: 148 (+) shrug sign degrees with pain  External rotation BTH: T4 with pain  Internal rotation BTB: T7 with pain    Passive Range of Motion   Left Shoulder   Flexion: 165 degrees   Abduction: 155 degrees   External rotation 90°: 95 degrees   Internal rotation 90°: 70 degrees     Right Shoulder   Flexion: 170 degrees with pain  Abduction: 170 degrees with pain  External rotation 90°: 110 degrees with pain  Internal rotation 90°: 50 degrees with pain    Joint Play   Left Shoulder  Joints within functional limits are the anterior capsule, posterior capsule and inferior capsule  Right Shoulder  Joints within functional limits are the anterior capsule, posterior capsule and inferior capsule  Strength/Myotome Testing     Left Shoulder   Normal muscle strength    Right Shoulder     Planes of Motion   Flexion: 3+ (+) pain   Abduction: 4 (+) pain   External rotation at 0°: 5   Internal rotation at 0°: 5     Right Elbow   Flexion: 5  Extension: 5    Right Wrist/Hand   Wrist extension: 5  Wrist flexion: 5      Flowsheet Rows      Most Recent Value   PT/OT G-Codes    Current Score 64   Projected Score 70             Precautions: HTN       Manuals 12/13 12/16 12/20 12/28 1/12 1/20       R shoulder PROM GR  GR BE BE        Gr  II-III GHJ mobs GR  GR BE BE        UT TPR GR   BE BE        R UT str   GR   BE         Reassessment      GR       Neuro Re-Ed                                                                                                        Ther Ex Patient education: HEP review      GR       UBE 4' / 4' 4' / 4' 4' / 4' 4'/4' 4'/4' 4' / 4'       Pulleys             Wall ladder: F only 10"x5 10"x5 10"x5 10"x5 10"x5 10"x5       Webslide: rows    GTB  2x10 GTB  2x10        ER - s/l             TB ER             TB IR             R UT str  Scaption wall slide             Wall clock             Scaption with DB             Prone shoulder ext  2x10        Prone h  abd      2x10        Prone rows  Theracane TPR R UT 5 min            Theracane R UT MWM 10x3"            PNF D2 UE flex YTB 2x10  YTB 2x10 YTB  2x10 YTB  2x10         Multidirectional ball stability on wall  GMB 20x ea  GMB 20x ea  GMB  20x ea  GMB  20x ea         Scaption wall slides RTB 2x10   RTB  2x10         Sleeper str  10x10"       Posterior capsule str  10x10"       IR str   With strap      20x5"       Ther Activity                                       Gait Training                                       Modalities

## 2022-01-26 ENCOUNTER — APPOINTMENT (OUTPATIENT)
Dept: PHYSICAL THERAPY | Facility: CLINIC | Age: 57
End: 2022-01-26
Payer: COMMERCIAL

## 2022-01-31 NOTE — PROGRESS NOTES
Junie Haro has attended a total of 15 physical therapy appointments to date  Patient was last treated on 1/20/21 and has no remaining appointments scheduled  Refer to most recent RE for updated goals, objective and subjective information  Patient will be discharged from physical therapy at this time

## 2022-02-01 ENCOUNTER — TELEPHONE (OUTPATIENT)
Dept: ADMINISTRATIVE | Facility: OTHER | Age: 57
End: 2022-02-01

## 2022-02-01 NOTE — TELEPHONE ENCOUNTER
----- Message from Tom Browning sent at 1/31/2022  8:16 AM EST -----  01/31/22 8:17 AM    Hello, our patient Rolando Carroll has had Pap Smear (HPV) aka Cervical Cancer Screening completed/performed  Please assist in updating the patient chart by pulling the Care Everywhere (CE) document  The date of service is 1/21/22       Thank you,  Tom Browning  [facility]

## 2022-02-01 NOTE — TELEPHONE ENCOUNTER
Upon review of the In Basket request we were able to locate, review, and update the patient chart as requested for Pap Smear (HPV) aka Cervical Cancer Screening  Any additional questions or concerns should be emailed to the Practice Liaisons via [email or web address] email, please do not reply via In Basket      Thank you  Arnaud Motley MA

## 2022-03-02 DIAGNOSIS — I10 ESSENTIAL HYPERTENSION: Chronic | ICD-10-CM

## 2022-03-07 RX ORDER — METOPROLOL SUCCINATE 100 MG/1
TABLET, EXTENDED RELEASE ORAL
Qty: 90 TABLET | Refills: 1 | Status: SHIPPED | OUTPATIENT
Start: 2022-03-07

## 2022-03-07 RX ORDER — HYDROCHLOROTHIAZIDE 12.5 MG/1
TABLET ORAL
Qty: 90 TABLET | Refills: 1 | Status: SHIPPED | OUTPATIENT
Start: 2022-03-07 | End: 2022-07-12

## 2022-03-28 ENCOUNTER — PREPPED CHART (OUTPATIENT)
Dept: URBAN - METROPOLITAN AREA CLINIC 6 | Facility: CLINIC | Age: 57
End: 2022-03-28

## 2022-03-31 ENCOUNTER — TELEPHONE (OUTPATIENT)
Dept: NEUROSURGERY | Facility: CLINIC | Age: 57
End: 2022-03-31

## 2022-03-31 DIAGNOSIS — D35.2 PITUITARY MACROADENOMA (HCC): Primary | ICD-10-CM

## 2022-03-31 NOTE — TELEPHONE ENCOUNTER
Pt calling in to schedule overdue follow up for pit adenoma  Has ophth appt 5/17, scheduled with [facility] 5/23, needs updated MRI order  Please place order

## 2022-04-01 ENCOUNTER — APPOINTMENT (EMERGENCY)
Dept: CT IMAGING | Facility: HOSPITAL | Age: 57
End: 2022-04-01
Payer: COMMERCIAL

## 2022-04-01 ENCOUNTER — APPOINTMENT (EMERGENCY)
Dept: RADIOLOGY | Facility: HOSPITAL | Age: 57
End: 2022-04-01
Payer: COMMERCIAL

## 2022-04-01 ENCOUNTER — HOSPITAL ENCOUNTER (EMERGENCY)
Facility: HOSPITAL | Age: 57
Discharge: HOME | End: 2022-04-02
Attending: EMERGENCY MEDICINE
Payer: COMMERCIAL

## 2022-04-01 DIAGNOSIS — R42 LIGHT-HEADEDNESS: Primary | ICD-10-CM

## 2022-04-01 LAB
2HR DELTA HS TROPONIN: 2 NG/L
ANION GAP SERPL CALCULATED.3IONS-SCNC: 5 MMOL/L (ref 4–13)
BASOPHILS # BLD AUTO: 0.03 THOUSANDS/ΜL (ref 0–0.1)
BASOPHILS NFR BLD AUTO: 1 % (ref 0–1)
BUN SERPL-MCNC: 22 MG/DL (ref 5–25)
CALCIUM SERPL-MCNC: 9.4 MG/DL (ref 8.4–10.2)
CARDIAC TROPONIN I PNL SERPL HS: 5 NG/L
CARDIAC TROPONIN I PNL SERPL HS: 7 NG/L
CHLORIDE SERPL-SCNC: 105 MMOL/L (ref 96–108)
CO2 SERPL-SCNC: 30 MMOL/L (ref 21–32)
CREAT SERPL-MCNC: 1.13 MG/DL (ref 0.6–1.3)
EOSINOPHIL # BLD AUTO: 0.17 THOUSAND/ΜL (ref 0–0.61)
EOSINOPHIL NFR BLD AUTO: 3 % (ref 0–6)
ERYTHROCYTE [DISTWIDTH] IN BLOOD BY AUTOMATED COUNT: 14 % (ref 11.6–15.1)
GFR SERPL CREATININE-BSD FRML MDRD: 54 ML/MIN/1.73SQ M
GLUCOSE SERPL-MCNC: 98 MG/DL (ref 65–140)
HCT VFR BLD AUTO: 39 % (ref 34.8–46.1)
HGB BLD-MCNC: 11.9 G/DL (ref 11.5–15.4)
IMM GRANULOCYTES # BLD AUTO: 0.01 THOUSAND/UL (ref 0–0.2)
IMM GRANULOCYTES NFR BLD AUTO: 0 % (ref 0–2)
LYMPHOCYTES # BLD AUTO: 3.31 THOUSANDS/ΜL (ref 0.6–4.47)
LYMPHOCYTES NFR BLD AUTO: 53 % (ref 14–44)
MCH RBC QN AUTO: 27.2 PG (ref 26.8–34.3)
MCHC RBC AUTO-ENTMCNC: 30.5 G/DL (ref 31.4–37.4)
MCV RBC AUTO: 89 FL (ref 82–98)
MONOCYTES # BLD AUTO: 0.76 THOUSAND/ΜL (ref 0.17–1.22)
MONOCYTES NFR BLD AUTO: 12 % (ref 4–12)
NEUTROPHILS # BLD AUTO: 1.97 THOUSANDS/ΜL (ref 1.85–7.62)
NEUTS SEG NFR BLD AUTO: 31 % (ref 43–75)
NRBC BLD AUTO-RTO: 0 /100 WBCS
PLATELET # BLD AUTO: 218 THOUSANDS/UL (ref 149–390)
PMV BLD AUTO: 12.4 FL (ref 8.9–12.7)
POTASSIUM SERPL-SCNC: 3.9 MMOL/L (ref 3.5–5.3)
RBC # BLD AUTO: 4.38 MILLION/UL (ref 3.81–5.12)
SODIUM SERPL-SCNC: 140 MMOL/L (ref 135–147)
TSH SERPL DL<=0.05 MIU/L-ACNC: 0.53 UIU/ML (ref 0.45–5.33)
WBC # BLD AUTO: 6.25 THOUSAND/UL (ref 4.31–10.16)

## 2022-04-01 PROCEDURE — 71045 X-RAY EXAM CHEST 1 VIEW: CPT

## 2022-04-01 PROCEDURE — G1004 CDSM NDSC: HCPCS

## 2022-04-01 PROCEDURE — 99285 EMERGENCY DEPT VISIT HI MDM: CPT

## 2022-04-01 PROCEDURE — 36415 COLL VENOUS BLD VENIPUNCTURE: CPT | Performed by: EMERGENCY MEDICINE

## 2022-04-01 PROCEDURE — 93005 ELECTROCARDIOGRAM TRACING: CPT

## 2022-04-01 PROCEDURE — 85025 COMPLETE CBC W/AUTO DIFF WBC: CPT | Performed by: EMERGENCY MEDICINE

## 2022-04-01 PROCEDURE — 70450 CT HEAD/BRAIN W/O DYE: CPT

## 2022-04-01 PROCEDURE — 80048 BASIC METABOLIC PNL TOTAL CA: CPT | Performed by: EMERGENCY MEDICINE

## 2022-04-01 PROCEDURE — 84443 ASSAY THYROID STIM HORMONE: CPT | Performed by: EMERGENCY MEDICINE

## 2022-04-01 PROCEDURE — 84484 ASSAY OF TROPONIN QUANT: CPT | Performed by: EMERGENCY MEDICINE

## 2022-04-01 RX ORDER — SODIUM CHLORIDE 9 MG/ML
3 INJECTION INTRAVENOUS
Status: DISCONTINUED | OUTPATIENT
Start: 2022-04-01 | End: 2022-04-02 | Stop reason: HOSPADM

## 2022-04-01 RX ORDER — SULFAMETHOXAZOLE AND TRIMETHOPRIM 800; 160 MG/1; MG/1
1 TABLET ORAL EVERY 12 HOURS SCHEDULED
COMMUNITY
End: 2022-04-12

## 2022-04-02 VITALS
OXYGEN SATURATION: 98 % | HEIGHT: 63 IN | TEMPERATURE: 97.3 F | SYSTOLIC BLOOD PRESSURE: 173 MMHG | DIASTOLIC BLOOD PRESSURE: 93 MMHG | BODY MASS INDEX: 32.6 KG/M2 | WEIGHT: 184 LBS | HEART RATE: 57 BPM | RESPIRATION RATE: 18 BRPM

## 2022-04-02 LAB
ATRIAL RATE: 56 BPM
ATRIAL RATE: 59 BPM
P AXIS: 63 DEGREES
P AXIS: 66 DEGREES
PR INTERVAL: 190 MS
PR INTERVAL: 192 MS
QRS AXIS: 65 DEGREES
QRS AXIS: 65 DEGREES
QRSD INTERVAL: 96 MS
QRSD INTERVAL: 96 MS
QT INTERVAL: 420 MS
QT INTERVAL: 448 MS
QTC INTERVAL: 415 MS
QTC INTERVAL: 432 MS
T WAVE AXIS: -80 DEGREES
T WAVE AXIS: -81 DEGREES
VENTRICULAR RATE: 56 BPM
VENTRICULAR RATE: 59 BPM

## 2022-04-02 PROCEDURE — 99285 EMERGENCY DEPT VISIT HI MDM: CPT | Performed by: EMERGENCY MEDICINE

## 2022-04-02 PROCEDURE — 93010 ELECTROCARDIOGRAM REPORT: CPT | Performed by: INTERNAL MEDICINE

## 2022-04-06 NOTE — ED PROVIDER NOTES
History  Chief Complaint   Patient presents with    Dizziness     Patient is a 72-year-old female with history of hyperlipidemia, hypertension presents for evaluation of lightheadedness  Patient says that this has been an ongoing issue for months  She says that she feels lightheaded after prolonged episodes of standing  She says after standing for 1/2 hours she begins to feel lightheaded and when she sits down she feels better  She denies any episodes of syncope  She denies chest pain, dyspnea, nausea vomiting or diaphoresis associated with the symptoms  The symptoms have been unchanged over the past several months and patient is unsure why she sought care today  She denies any room spinning dizziness, headache, altered mental status, focal neurologic deficit, recent head strike  She has not taken anything for symptoms  She otherwise has been compliant with medications  She also has a secondary complaint of swelling over her posterior neck  She says that initially this just occurred over the past several days  However on chart review, patient has a surgery plan for excision of a cyst on the back of her neck  When she was confronted about this, she admits that has been going on for very long time  It is nonpainful unchanged from baseline  Prior to Admission Medications   Prescriptions Last Dose Informant Patient Reported?  Taking?   hydrochlorothiazide (HYDRODIURIL) 12 5 mg tablet   No No   Sig: TAKE 1 TABLET BY MOUTH EVERY DAY   metoprolol succinate (TOPROL-XL) 100 mg 24 hr tablet   No No   Sig: TAKE 1 TABLET BY MOUTH EVERY DAY   naproxen (NAPROSYN) 500 mg tablet   No No   Sig: Take 1 tablet (500 mg total) by mouth 2 (two) times a day with meals for 7 days   sulfamethoxazole-trimethoprim (BACTRIM DS) 800-160 mg per tablet   Yes Yes   Sig: Take 1 tablet by mouth every 12 (twelve) hours      Facility-Administered Medications: None       Past Medical History:   Diagnosis Date    Compression of optic chiasm 2019    GERD (gastroesophageal reflux disease)     Hypercholesteremia     Hypertension     Pituitary tumor        Past Surgical History:   Procedure Laterality Date    BRAIN SURGERY       SECTION      OR NEUROENDOSCOP,EXC,PIT ESPERANZA,TRANSNAS/SPHEN N/A 2019    Procedure: Image guided endoscopic transnasal transsphenoidal resection of pituitary mass, abdominal fat graft, possible lumbar drain;  Surgeon: Nicho Sanchez MD;  [facility];  Service: Neurosurgery    TRANSPHENOIDAL / TRANSNASAL HYPOPHYSECTOMY / RESECTION PITUITARY TUMOR         Family History   Problem Relation Age of Onset    Cancer Mother     Heart disease Father     Hypertension Father     No Known Problems Daughter     No Known Problems Maternal Aunt     Leukemia Brother     No Known Problems Maternal Grandmother     No Known Problems Paternal Grandmother     No Known Problems Maternal Aunt     No Known Problems Paternal Aunt     No Known Problems Paternal Aunt      I have reviewed and agree with the history as documented  E-Cigarette/Vaping    E-Cigarette Use Never User      E-Cigarette/Vaping Substances    Nicotine No     THC No     CBD No     Flavoring No     Other No     Unknown No      Social History     Tobacco Use    Smoking status: Never Smoker    Smokeless tobacco: Never Used   Vaping Use    Vaping Use: Never used   Substance Use Topics    Alcohol use: Not Currently    Drug use: Not Currently       Review of Systems   Constitutional: Negative for fever  HENT: Negative for sore throat  Respiratory: Negative for shortness of breath  Cardiovascular: Negative for chest pain  Gastrointestinal: Negative for abdominal pain  Genitourinary: Negative for dysuria  Musculoskeletal: Negative for back pain  Skin: Negative for rash  Neurological: Positive for light-headedness  Psychiatric/Behavioral: Negative for agitation     All other systems reviewed and are negative  Physical Exam  Physical Exam  Vitals reviewed  Constitutional:       General: She is not in acute distress  Appearance: She is well-developed  HENT:      Head: Normocephalic  Eyes:      Pupils: Pupils are equal, round, and reactive to light  Cardiovascular:      Rate and Rhythm: Normal rate and regular rhythm  Heart sounds: Normal heart sounds  Pulmonary:      Effort: Pulmonary effort is normal       Breath sounds: Normal breath sounds  Abdominal:      General: Bowel sounds are normal  There is no distension  Palpations: Abdomen is soft  Tenderness: There is no abdominal tenderness  There is no guarding  Musculoskeletal:         General: No tenderness or deformity  Normal range of motion  Cervical back: Normal range of motion and neck supple  Skin:     General: Skin is warm and dry  Capillary Refill: Capillary refill takes less than 2 seconds  Neurological:      Mental Status: She is alert and oriented to person, place, and time  Cranial Nerves: No cranial nerve deficit  Sensory: No sensory deficit  Comments: Alert oriented x3  GCS 15  Cranial nerves 2-12 intact  Strength 5/5 in all 4 extremities  Sensation intact throughout  Psychiatric:         Behavior: Behavior normal          Thought Content:  Thought content normal          Judgment: Judgment normal          Vital Signs  ED Triage Vitals [04/01/22 1853]   Temperature Pulse Respirations Blood Pressure SpO2   (!) 97 3 °F (36 3 °C) 64 18 (!) 178/94 100 %      Temp Source Heart Rate Source Patient Position - Orthostatic VS BP Location FiO2 (%)   Temporal Monitor Sitting Left arm --      Pain Score       --           Vitals:    04/01/22 1853 04/01/22 2315 04/02/22 0027   BP: (!) 178/94 (!) 173/93    Pulse: 64 58 57   Patient Position - Orthostatic VS: Sitting           Visual Acuity      ED Medications  Medications - No data to display    Diagnostic Studies  Results Reviewed Procedure Component Value Units Date/Time    HS Troponin I 2hr [[id number]]  (Normal) Collected: 04/01/22 2313    Lab Status: Final result Specimen: Blood from Arm, Right Updated: 04/01/22 2349     hs TnI 2hr 7 ng/L      Delta 2hr hsTnI 2 ng/L     Basic metabolic panel [[id number]] Collected: 04/01/22 1926    Lab Status: Final result Specimen: Blood from Line, Venous Updated: 04/01/22 2005     Sodium 140 mmol/L      Potassium 3 9 mmol/L      Chloride 105 mmol/L      CO2 30 mmol/L      ANION GAP 5 mmol/L      BUN 22 mg/dL      Creatinine 1 13 mg/dL      Glucose 98 mg/dL      Calcium 9 4 mg/dL      eGFR 54 ml/min/1 73sq m     Narrative:      [place] guidelines for Chronic Kidney Disease (CKD):     Stage 1 with normal or high GFR (GFR > 90 mL/min/1 73 square meters)    Stage 2 Mild CKD (GFR = 60-89 mL/min/1 73 square meters)    Stage 3A Moderate CKD (GFR = 45-59 mL/min/1 73 square meters)    Stage 3B Moderate CKD (GFR = 30-44 mL/min/1 73 square meters)    Stage 4 Severe CKD (GFR = 15-29 mL/min/1 73 square meters)    Stage 5 End Stage CKD (GFR <15 mL/min/1 73 square meters)  Note: GFR calculation is accurate only with a steady state creatinine    TSH, 3rd generation with Free T4 reflex [[id number]]  (Normal) Collected: 04/01/22 1926    Lab Status: Final result Specimen: Blood from Line, Venous Updated: 04/01/22 2005     TSH 3RD GENERATON 0 534 uIU/mL     Narrative:      Patients undergoing fluorescein dye angiography may retain small amounts of fluorescein in the body for 48-72 hours post procedure  Samples containing fluorescein can produce falsely depressed TSH values  If the patient had this procedure,a specimen should be resubmitted post fluorescein clearance        HS Troponin 0hr (reflex protocol) [[id number]]  (Normal) Collected: 04/01/22 1926    Lab Status: Final result Specimen: Blood from Line, Venous Updated: 04/01/22 1956     hs TnI 0hr 5 ng/L     CBC and differential [[id number]] (Abnormal) Collected: 04/01/22 1926    Lab Status: Final result Specimen: Blood from Line, Venous Updated: 04/01/22 1932     WBC 6 25 Thousand/uL      RBC 4 38 Million/uL      Hemoglobin 11 9 g/dL      Hematocrit 39 0 %      MCV 89 fL      MCH 27 2 pg      MCHC 30 5 g/dL      RDW 14 0 %      MPV 12 4 fL      Platelets 957 Thousands/uL      nRBC 0 /100 WBCs      Neutrophils Relative 31 %      Immat GRANS % 0 %      Lymphocytes Relative 53 %      Monocytes Relative 12 %      Eosinophils Relative 3 %      Basophils Relative 1 %      Neutrophils Absolute 1 97 Thousands/µL      Immature Grans Absolute 0 01 Thousand/uL      Lymphocytes Absolute 3 31 Thousands/µL      Monocytes Absolute 0 76 Thousand/µL      Eosinophils Absolute 0 17 Thousand/µL      Basophils Absolute 0 03 Thousands/µL                  CT head without contrast   Final Result by Gretel Michel MD (04/01 1954)         1  No acute intracranial hemorrhage, mass effect or edema  2  Stable postoperative changes at the skull base and paranasal sinuses, status post transsphenoidal resection  Workstation performed: [id number]         X-ray chest 1 view portable   Final Result by Marlon Capps MD (38/31 5342)      No acute cardiopulmonary disease        Findings are stable            Workstation performed: [id number]                    Procedures  ECG 12 Lead Documentation Only    Date/Time: 4/6/2022 12:22 PM  Performed by: Kanika Benitez MD  Authorized by: Kanika Benitez MD     ECG reviewed by me, the ED Provider: yes    Patient location:  ED  Previous ECG:     Previous ECG:  Compared to current    Similarity:  No change    Comparison to cardiac monitor: Yes    Interpretation:     Interpretation: non-specific    Rate:     ECG rate assessment: normal    Rhythm:     Rhythm: sinus rhythm    Ectopy:     Ectopy: none    QRS:     QRS axis:  Normal    QRS intervals:  Normal  Conduction:     Conduction: normal    ST segments: ST segments:  Normal  T waves:     T waves: inverted      Inverted:  II, III, aVF, V3, V4, V5 and V6  Other findings:     Other findings: LVH               ED Course                                             MDM  Number of Diagnoses or Management Options  Light-headedness  Diagnosis management comments: Patient is a 52-year-old female who presents for evaluation of lightheadedness  Unclear etiology, does not seem to be orthostatic in nature  Patient was hemodynamically stable here in the emergency department  Workup was negative  Advised on follow-up with Cardiology and Holter monitor order placed  Disposition  Final diagnoses:   Light-headedness     Time reflects when diagnosis was documented in both MDM as applicable and the Disposition within this note     Time User Action Codes Description Comment    4/1/2022 11:59 PM Remigio Amaral Add [R42] Light-headedness       ED Disposition     ED Disposition Condition Date/Time Comment    Discharge Stable Fri Apr 1, 2022 11:59 PM Victor M Leon discharge to home/self care  Follow-up Information     Follow up With Specialties Details Why Contact Info Additional [address] Emergency Department Emergency Medicine  If symptoms worsen [address]  [place] [phone]  [facility], [address] [place] [address] [facility]    Toshia Hunter MD Cardiology Schedule an appointment as soon as possible for a visit   Mack Hodges [phone] [facility]             Discharge Medication List as of 4/2/2022 12:18 AM      CONTINUE these medications which have NOT CHANGED    Details   sulfamethoxazole-trimethoprim (BACTRIM DS) 800-160 mg per tablet Take 1 tablet by mouth every 12 (twelve) hours, Historical Med      hydrochlorothiazide (HYDRODIURIL) 12 5 mg tablet TAKE 1 TABLET BY MOUTH EVERY DAY, Normal metoprolol succinate (TOPROL-XL) 100 mg 24 hr tablet TAKE 1 TABLET BY MOUTH EVERY DAY, Normal      naproxen (NAPROSYN) 500 mg tablet Take 1 tablet (500 mg total) by mouth 2 (two) times a day with meals for 7 days, Starting Tue 9/21/2021, Until Tue 9/28/2021, Normal      rosuvastatin (CRESTOR) 10 MG tablet Take 1 tablet (10 mg total) by mouth daily, Starting Tue 9/21/2021, Normal             Outpatient Discharge Orders   Ambulatory Referral to Cardiology   Standing Status: Future Standing Exp  Date: 04/01/23      Holter monitor   Standing Status: Future Standing Exp   Date: 04/01/26       PDMP Review       Value Time User    PDMP Reviewed  Yes 11/14/2020  9:30 AM Sven Erwin [facility] Provider  Electronically Signed by           Kanika Benitez MD  04/06/22 8966

## 2022-04-12 ENCOUNTER — HOSPITAL ENCOUNTER (OUTPATIENT)
Dept: NON INVASIVE DIAGNOSTICS | Facility: CLINIC | Age: 57
Discharge: HOME | End: 2022-04-12
Payer: COMMERCIAL

## 2022-04-12 ENCOUNTER — CONSULT (OUTPATIENT)
Dept: CARDIOLOGY CLINIC | Facility: CLINIC | Age: 57
End: 2022-04-12
Payer: COMMERCIAL

## 2022-04-12 VITALS
WEIGHT: 191 LBS | HEIGHT: 63 IN | BODY MASS INDEX: 33.84 KG/M2 | SYSTOLIC BLOOD PRESSURE: 130 MMHG | HEART RATE: 74 BPM | DIASTOLIC BLOOD PRESSURE: 86 MMHG

## 2022-04-12 DIAGNOSIS — E78.2 MIXED HYPERLIPIDEMIA: ICD-10-CM

## 2022-04-12 DIAGNOSIS — R06.02 SOB (SHORTNESS OF BREATH): Primary | ICD-10-CM

## 2022-04-12 DIAGNOSIS — I10 ESSENTIAL HYPERTENSION: ICD-10-CM

## 2022-04-12 DIAGNOSIS — D35.2 PITUITARY MACROADENOMA (HCC): ICD-10-CM

## 2022-04-12 DIAGNOSIS — R42 LIGHT-HEADEDNESS: ICD-10-CM

## 2022-04-12 PROCEDURE — 1036F TOBACCO NON-USER: CPT | Performed by: PHYSICIAN ASSISTANT

## 2022-04-12 PROCEDURE — 99204 OFFICE O/P NEW MOD 45 MIN: CPT | Performed by: PHYSICIAN ASSISTANT

## 2022-04-12 PROCEDURE — 3008F BODY MASS INDEX DOCD: CPT | Performed by: PHYSICIAN ASSISTANT

## 2022-04-12 PROCEDURE — 3075F SYST BP GE 130 - 139MM HG: CPT | Performed by: PHYSICIAN ASSISTANT

## 2022-04-12 PROCEDURE — 3079F DIAST BP 80-89 MM HG: CPT | Performed by: PHYSICIAN ASSISTANT

## 2022-04-12 PROCEDURE — 93225 XTRNL ECG REC<48 HRS REC: CPT

## 2022-04-12 PROCEDURE — 93226 XTRNL ECG REC<48 HR SCAN A/R: CPT

## 2022-04-12 NOTE — ASSESSMENT & PLAN NOTE
Sensation of lightheadedness with exertion  Usually experienced at the top of the stairs  Associated with shortness of breath  Holter monitor will be obtained to assess for concerning arrhythmias    See additional comments

## 2022-04-12 NOTE — ASSESSMENT & PLAN NOTE
Exertional breathlessness  Two-dimensional echocardiogram will be obtained to assess LV function as well as WMA     BNP will also be checked

## 2022-04-12 NOTE — ASSESSMENT & PLAN NOTE
Most recent surgical intervention 2019      Has an upcoming appointment with neurologist:  Joshua this year

## 2022-04-12 NOTE — PROGRESS NOTES
[facility]   Outpatient Note  Trino Pittman  1965  [id number]  [facility] Julisa Conti [facility] [place] [address]  Matthew Renae9    Trino Pittman is a 65 y o  female    Assessment and Plan:   Mixed hyperlipidemia  Tolerating statin therapy   Continue rosuvastatin 10 mg daily    Light-headedness  Sensation of lightheadedness with exertion  Usually experienced at the top of the stairs  Associated with shortness of breath  Holter monitor will be obtained to assess for concerning arrhythmias  See additional comments    Essential hypertension  Controlled on current medical regimen   Continue metoprolol and hydrochlorothiazide    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[address] )  Most recent surgical intervention 2019  Has an upcoming appointment with neurologist:  Joshua this year    SOB (shortness of breath)  Exertional breathlessness  Two-dimensional echocardiogram will be obtained to assess LV function as well as WMA  BNP will also be checked        Additional Plan:   Medications as detailed above  Pertinent testing orders as outlined  Available lab and test results are reviewed with the patient and any additional required labs are ordered as noted  Return visit will be in one month or earlier if there are problems  The patient is encouraged to call in the meantime if there are questions or concerns  Subjective: The patient is seen in the office today for complaints of lightheadedness  She usually experiences this at after running up the stairs  It is associated with shortness of breath  She usually does not experience this with getting up too quickly from a chair or getting up from a lying position  She has no accompanying chest pain palpitations or syncope  She has no TIA or CVA symptoms  She has no PND or edema  She does not report orthopnea    It is of note that 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 and has had 2 successful surgeries  The 2nd being in 2019  Her follow-up with her neurologist has been postponed due to [place]  She does have an upcoming follow-up in May of this year        Social History  Social History     Tobacco Use   Smoking Status Never Smoker   Smokeless Tobacco Never Used   ,   Social History     Substance and Sexual Activity   Alcohol Use Not Currently   ,   Social History     Substance and Sexual Activity   Drug Use Not Currently     Family History   Problem Relation Age of Onset    Cancer Mother     Heart disease Father     Hypertension Father     No Known Problems Daughter     No Known Problems Maternal Aunt     Leukemia Brother     No Known Problems Maternal Grandmother     No Known Problems Paternal Grandmother     No Known Problems Maternal Aunt     No Known Problems Paternal Aunt     No Known Problems Paternal Aunt        Medical and Surgical History  Past Medical History:   Diagnosis Date    Compression of optic chiasm 2019    GERD (gastroesophageal reflux disease)     Hypercholesteremia     Hypertension     Pituitary tumor      Past Surgical History:   Procedure Laterality Date    BRAIN SURGERY       SECTION      [place] NEUROENDOSCOP,EXC,PIT ESPERANZA,TRANSNAS/SPHEN N/A 2019    Procedure: Image guided endoscopic transnasal transsphenoidal resection of pituitary mass, abdominal fat graft, possible lumbar drain;  Surgeon: Marley Bhatt MD;  [facility];  Service: Neurosurgery    TRANSPHENOIDAL / TRANSNASAL HYPOPHYSECTOMY / RESECTION PITUITARY TUMOR           Current Outpatient Medications:     hydrochlorothiazide (HYDRODIURIL) 12 5 mg tablet, TAKE 1 TABLET BY MOUTH EVERY DAY, Disp: 90 tablet, Rfl: 1    metoprolol succinate (TOPROL-XL) 100 mg 24 hr tablet, TAKE 1 TABLET BY MOUTH EVERY DAY, Disp: 90 tablet, Rfl: 1    rosuvastatin (CRESTOR) 10 MG tablet, TAKE 1 TABLET BY MOUTH EVERY DAY, Disp: 90 tablet, Rfl: 1  No Known Allergies    Review of Systems   Cardiovascular: Positive for dyspnea on exertion  Neurological: Positive for light-headedness  Objective:   /86   Pulse 74   Ht 5' 3" (1 6 m)   Wt 86 6 kg (191 lb)   BMI 33 83 kg/m²   Physical Exam  Vitals and nursing note reviewed  Constitutional:       Appearance: She is well-developed  She is obese  HENT:      Head: Normocephalic and atraumatic  Mouth/Throat:      Mouth: Mucous membranes are moist    Eyes:      General: No scleral icterus  Conjunctiva/sclera: Conjunctivae normal    Neck:      Vascular: No JVD  Trachea: No tracheal deviation  Cardiovascular:      Rate and Rhythm: Normal rate and regular rhythm  Pulses: Intact distal pulses  Heart sounds: Normal heart sounds, S1 normal and S2 normal  No murmur heard  No friction rub  No gallop  Pulmonary:      Effort: Pulmonary effort is normal  No respiratory distress  Breath sounds: Normal breath sounds  No wheezing or rales  Chest:      Chest wall: No tenderness  Abdominal:      General: Bowel sounds are normal  There is no distension  Palpations: Abdomen is soft  Tenderness: There is no abdominal tenderness  Comments: Aorta not palpable    Musculoskeletal:         General: No tenderness  Normal range of motion  Cervical back: Normal range of motion and neck supple  Right lower leg: No edema  Left lower leg: No edema  Skin:     General: Skin is warm and dry  Coloration: Skin is not pale  Findings: No erythema or rash  Neurological:      General: No focal deficit present  Mental Status: She is alert and oriented to person, place, and time     Psychiatric:         Mood and Affect: Mood normal          Behavior: Behavior normal          Judgment: Judgment normal          Lab Review:   No results found for: CHOL  Lab Results   Component Value Date    HDL 64 08/17/2021     Lab Results   Component Value Date    LDLCALC 181 (H) 08/17/2021     Lab Results   Component Value Date    TRIG 93 08/17/2021     Results Reviewed     None        Results Reviewed     None        Results Reviewed     None          Recent Cardiovascular Testing:   Echo and Holter are ordered    MAYA 04/09/2019:  Normal LVEF 60%, no ischemia      ECG Review:   04/01/2022:  Sinus bradycardia, LVH with repolarization abnormality, nonspecific ST T wave abnormality

## 2022-04-20 PROCEDURE — 93227 XTRNL ECG REC<48 HR R&I: CPT | Performed by: INTERNAL MEDICINE

## 2022-04-21 ENCOUNTER — HOSPITAL ENCOUNTER (OUTPATIENT)
Dept: MRI IMAGING | Facility: HOSPITAL | Age: 57
Discharge: HOME | End: 2022-04-21
Attending: NEUROLOGICAL SURGERY
Payer: COMMERCIAL

## 2022-04-21 DIAGNOSIS — D35.2 PITUITARY MACROADENOMA (HCC): ICD-10-CM

## 2022-04-21 PROCEDURE — 70553 MRI BRAIN STEM W/O & W/DYE: CPT

## 2022-04-21 PROCEDURE — A9585 GADOBUTROL INJECTION: HCPCS | Performed by: RADIOLOGY

## 2022-04-21 PROCEDURE — G1004 CDSM NDSC: HCPCS

## 2022-04-21 RX ADMIN — GADOBUTROL 8 ML: 604.72 INJECTION INTRAVENOUS at 18:54

## 2022-05-17 ENCOUNTER — ESTABLISHED COMPREHENSIVE EXAM (OUTPATIENT)
Dept: URBAN - METROPOLITAN AREA CLINIC 6 | Facility: CLINIC | Age: 57
End: 2022-05-17

## 2022-05-17 DIAGNOSIS — H25.813: ICD-10-CM

## 2022-05-17 DIAGNOSIS — D44.3: ICD-10-CM

## 2022-05-17 PROCEDURE — 92015 DETERMINE REFRACTIVE STATE: CPT

## 2022-05-17 PROCEDURE — 92014 COMPRE OPH EXAM EST PT 1/>: CPT

## 2022-05-17 PROCEDURE — 92083 EXTENDED VISUAL FIELD XM: CPT

## 2022-05-17 ASSESSMENT — VISUAL ACUITY
OU_CC: J6
OD_CC: 20/30+1
OS_PH: 20/40
OS_CC: 20/40

## 2022-05-17 ASSESSMENT — TONOMETRY
OS_IOP_MMHG: 7
OD_IOP_MMHG: 7

## 2022-05-24 ENCOUNTER — HOSPITAL ENCOUNTER (OUTPATIENT)
Dept: NON INVASIVE DIAGNOSTICS | Facility: CLINIC | Age: 57
Discharge: HOME | End: 2022-05-24
Payer: COMMERCIAL

## 2022-05-24 VITALS
DIASTOLIC BLOOD PRESSURE: 74 MMHG | SYSTOLIC BLOOD PRESSURE: 126 MMHG | BODY MASS INDEX: 33.84 KG/M2 | HEART RATE: 68 BPM | HEIGHT: 63 IN | WEIGHT: 191 LBS

## 2022-05-24 DIAGNOSIS — R06.02 SOB (SHORTNESS OF BREATH): ICD-10-CM

## 2022-05-24 DIAGNOSIS — R42 LIGHT-HEADEDNESS: ICD-10-CM

## 2022-05-24 LAB
AORTIC ROOT: 2.6 CM
AORTIC VALVE MEAN VELOCITY: 9.4 M/S
APICAL FOUR CHAMBER EJECTION FRACTION: 48 %
ASCENDING AORTA: 2.9 CM
AV LVOT MEAN GRADIENT: 3 MMHG
AV LVOT PEAK GRADIENT: 6 MMHG
AV MEAN GRADIENT: 4 MMHG
AV PEAK GRADIENT: 7 MMHG
AV VELOCITY RATIO: 0.95
DOP CALC AO PEAK VEL: 1.33 M/S
DOP CALC AO VTI: 31.47 CM
DOP CALC LVOT PEAK VEL VTI: 27.58 CM
DOP CALC LVOT PEAK VEL: 1.27 M/S
DOP CALC RVOT PEAK VEL: 0.78 M/S
DOP CALC RVOT VTI: 20.53 CM
E WAVE DECELERATION TIME: 270 MS
FRACTIONAL SHORTENING: 36 (ref 28–44)
INTERVENTRICULAR SEPTUM IN DIASTOLE (PARASTERNAL SHORT AXIS VIEW): 0.9 CM
INTERVENTRICULAR SEPTUM: 0.9 CM (ref 0.6–1.1)
LAAS-AP4: 16.2 CM2
LEFT ATRIUM SIZE: 3.4 CM
LEFT INTERNAL DIMENSION IN SYSTOLE: 2.7 CM (ref 2.1–4)
LEFT VENTRICULAR INTERNAL DIMENSION IN DIASTOLE: 4.2 CM (ref 3.5–6)
LEFT VENTRICULAR POSTERIOR WALL IN END DIASTOLE: 1 CM
LEFT VENTRICULAR STROKE VOLUME: 52 ML
LVSV (TEICH): 52 ML
MV E'TISSUE VEL-SEP: 7 CM/S
MV PEAK A VEL: 0.72 M/S
MV PEAK E VEL: 63 CM/S
MV STENOSIS PRESSURE HALF TIME: 78 MS
MV VALVE AREA P 1/2 METHOD: 2.82
PV MEAN GRADIENT: 2 MMHG
PV MEAN VELOCITY: 74 CM/S
PV PEAK GRADIENT: 5 MMHG
PV PEAK VELOCITY: 109 CM/S
PV VTI: 26.92 CM
RIGHT VENTRICLE ID DIMENSION: 2.6 CM
SL CV LV EF: 60
SL CV PED ECHO LEFT VENTRICLE DIASTOLIC VOLUME (MOD BIPLANE) 2D: 78 ML
SL CV PED ECHO LEFT VENTRICLE SYSTOLIC VOLUME (MOD BIPLANE) 2D: 26 ML
SL CV RVOT MAX GRADIENT: 2 MMHG
SL CV RVOT MEAN GRADIENT: 1 MMHG
SL CV RVOT VMEAN: 0.56 M/S
TR MAX PG: 25 MMHG
TR PEAK VELOCITY: 2.5 M/S
TRICUSPID VALVE PEAK REGURGITATION VELOCITY: 2.5 M/S

## 2022-05-24 PROCEDURE — 93306 TTE W/DOPPLER COMPLETE: CPT

## 2022-05-24 PROCEDURE — 93306 TTE W/DOPPLER COMPLETE: CPT | Performed by: INTERNAL MEDICINE

## 2022-06-20 ENCOUNTER — OFFICE VISIT (OUTPATIENT)
Dept: NEUROSURGERY | Facility: CLINIC | Age: 57
End: 2022-06-20
Payer: COMMERCIAL

## 2022-06-20 VITALS
SYSTOLIC BLOOD PRESSURE: 150 MMHG | BODY MASS INDEX: 33.49 KG/M2 | TEMPERATURE: 97.6 F | RESPIRATION RATE: 16 BRPM | DIASTOLIC BLOOD PRESSURE: 90 MMHG | HEART RATE: 56 BPM | WEIGHT: 189 LBS | HEIGHT: 63 IN

## 2022-06-20 DIAGNOSIS — I10 ESSENTIAL HYPERTENSION: Primary | ICD-10-CM

## 2022-06-20 DIAGNOSIS — D35.2 PITUITARY MACROADENOMA (HCC): ICD-10-CM

## 2022-06-20 DIAGNOSIS — Z01.818 PRE-PROCEDURAL EXAMINATION: ICD-10-CM

## 2022-06-20 PROCEDURE — 3077F SYST BP >= 140 MM HG: CPT | Performed by: NURSE PRACTITIONER

## 2022-06-20 PROCEDURE — 1036F TOBACCO NON-USER: CPT | Performed by: NURSE PRACTITIONER

## 2022-06-20 PROCEDURE — 3080F DIAST BP >= 90 MM HG: CPT | Performed by: NURSE PRACTITIONER

## 2022-06-20 PROCEDURE — 3008F BODY MASS INDEX DOCD: CPT | Performed by: NURSE PRACTITIONER

## 2022-06-20 PROCEDURE — 99214 OFFICE O/P EST MOD 30 MIN: CPT | Performed by: NURSE PRACTITIONER

## 2022-06-20 RX ORDER — ASPIRIN 81 MG/1
81 TABLET ORAL AS NEEDED
COMMUNITY

## 2022-06-20 NOTE — PROGRESS NOTES
Neurosurgery Office Note  Tristen Robbins 65 y o  female MRN: [id number]      Assessment/Plan     Pituitary macroadenoma [facility])  Patient seen outpatient office today for continued follow-up status post pituitary adenoma resection  · Patient was originally seen in consult by our service in August 2019 for recurrent pituitary adenoma  · History of initial resection in 2004 by Lary Zapata who was following with [facility] who noted reoccurrence in 2017 on serial imaging  · Patient underwent a image guided endoscopic transnasal transsphenoidal resection of pituitary mass and abdominal fat graft on 11/07/2019 by Dr Treviño and Dr Traore  · Final pathology:  Pituitary macroadenoma,gonadotrophic type secreting steroidogenic factor-1 (expert opinion from [facility] 12/11/19)  · Of note, pituitary labs in 2019 were normal   · Patient complaining of occasional headaches and dizziness when she exerts herself also with walking up steps with associated shortness of breath  She was seen by cardiology    Imaging:    MRI brain pituitary w/wo 4/21/22:Stable MR appearance of the postoperative sella without evidence for recurrent macroadenoma  Plan:  · Reviewed imaging with patient  · Recommend continued follow-up with cardiology for ongoing dizziness  · Patient will follow-up in 1 year with repeat MRI brain pituitary w/wo for ongoing surveillance or sooner if symptoms worsen  · Patient made aware to seek care sooner if she develops any new or worsening neurological changes or in flag signs  · Patient made aware to contact Neurosurgery with any further questions or concerns  Diagnoses and all orders for this visit:    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[address] )  -     MRI brain pituitary wo and w contrast; Future    Other orders  -     aspirin (ECOTRIN LOW STRENGTH) 81 mg EC tablet;  Take 81 mg by mouth as needed            CHIEF COMPLAINT    Chief Complaint   Patient presents with    Follow-up       HISTORY    History of Present Illness     65y o  year old female with past medical history significant for GERD, hypertension, lightheadedness, obesity, hyperlipidemia, and prediabetes  Patient seen in outpatient office today for continued follow-up status post pituitary adenoma resection  Patient was originally seen in consult by our service in August 2019 for recurrent pituitary adenoma  Patient with history of initial resection in 2004 by Lary Zapata who was following with [facility] who noted reoccurrence of 2017 on serial imaging  Imaging demonstrated mass within the sella and displacement of optic chiasm  Patient ultimately underwent a image guided endoscopic transnasal transsphenoidal resection of pituitary mass and abdominal fat graft on 11/07/2019 by Dr Treviño and Dr Traore  Final pathology pituitary macroadenoma, gonadotrophic type secreting steroidogenic factor-1 (expert opinion from [facility] 12/11/19)  Of note pituitary labs in 2019 were normal     Patient states she is been doing well but for the past few months has bee having dizziness when she exerts herself and especially when going up the steps with associated shortness of breath  She has been seen by Cardiology who ordered a echocardiogram as well as Holter monitor and blood work  She also endorses occasional headaches but nothing out of the ordinary  She also endorses at the top of her head it is very tender as well as occasionally she will have sharp pain there and states she is losing hair in that area as well  She denies any changes in her vision  She denies any recent falls or traumas or difficulty with her balance  Patient denies symptoms of hormonal imbalance such as change in glove or shoe size, or sensitivity to heat/cold  She denies any blurry vision, chest pain, abdominal pain, nausea, vomiting, diarrhea, no problems with bowel or bladder, no new weakness or numbness/tingling      HPI    See Discussion    REVIEW OF SYSTEMS    Review of Systems Constitutional: Negative  HENT: Negative  Eyes: Negative  Respiratory: Positive for shortness of breath (sometimes)  Cardiovascular: Negative  Gastrointestinal: Negative  Endocrine: Negative  Genitourinary: Negative  Musculoskeletal: Negative  Skin: Negative  Allergic/Immunologic: Negative  Neurological: Positive for dizziness (sometimes) and headaches (slight headaches at the top of her head sometimes)  Negative for tremors, seizures, speech difficulty, weakness, light-headedness and numbness  Hematological: Bruises/bleeds easily (asa81)  Psychiatric/Behavioral: Negative  ROS reviewed with patient and agree and changes were made as needed    Meds/Allergies     Current Outpatient Medications   Medication Sig Dispense Refill    aspirin (ECOTRIN LOW STRENGTH) 81 mg EC tablet Take 81 mg by mouth as needed      hydrochlorothiazide (HYDRODIURIL) 12 5 mg tablet TAKE 1 TABLET BY MOUTH EVERY DAY 90 tablet 1    metoprolol succinate (TOPROL-XL) 100 mg 24 hr tablet TAKE 1 TABLET BY MOUTH EVERY DAY 90 tablet 1    rosuvastatin (CRESTOR) 10 MG tablet TAKE 1 TABLET BY MOUTH EVERY DAY 90 tablet 1     No current facility-administered medications for this visit         No Known Allergies    PAST HISTORY    Past Medical History:   Diagnosis Date    Compression of optic chiasm 2019    GERD (gastroesophageal reflux disease)     Hypercholesteremia     Hypertension     Pituitary tumor        Past Surgical History:   Procedure Laterality Date    BRAIN SURGERY       SECTION      DE NEUROENDOSCOP,EXC,PIT ESPERANZA,TRANSNAS/SPHEN N/A 2019    Procedure: Image guided endoscopic transnasal transsphenoidal resection of pituitary mass, abdominal fat graft, possible lumbar drain;  Surgeon: Nikhil Conde MD;  [facility];  Service: Neurosurgery    TRANSPHENOIDAL / TRANSNASAL HYPOPHYSECTOMY / RESECTION PITUITARY TUMOR         Social History     Tobacco Use    Smoking status: Never Smoker    Smokeless tobacco: Never Used   Vaping Use    Vaping Use: Never used   Substance Use Topics    Alcohol use: Not Currently    Drug use: Not Currently       Family History   Problem Relation Age of Onset    Cancer Mother     Heart disease Father     Hypertension Father     No Known Problems Daughter     No Known Problems Maternal Aunt     Leukemia Brother     No Known Problems Maternal Grandmother     No Known Problems Paternal Grandmother     No Known Problems Maternal Aunt     No Known Problems Paternal Aunt     No Known Problems Paternal Aunt          Above history personally reviewed  EXAM    Vitals:Blood pressure 150/90, pulse 56, temperature 97 6 °F (36 4 °C), temperature source Temporal, resp  rate 16, height 5' 3" (1 6 m), weight 85 7 kg (189 lb)  ,Body mass index is 33 48 kg/m²  Physical Exam  Vitals reviewed  Constitutional:       General: She is awake  She is not in acute distress  Appearance: Normal appearance  She is not ill-appearing  HENT:      Head: Normocephalic and atraumatic  Eyes:      Extraocular Movements: Extraocular movements intact and EOM normal       Conjunctiva/sclera: Conjunctivae normal       Pupils: Pupils are equal, round, and reactive to light  Cardiovascular:      Rate and Rhythm: Normal rate  Pulmonary:      Effort: Pulmonary effort is normal  No respiratory distress  Chest:      Chest wall: No tenderness  Abdominal:      General: There is no distension  Palpations: Abdomen is soft  Tenderness: There is no abdominal tenderness  Musculoskeletal:         General: Normal range of motion  Cervical back: Normal range of motion and neck supple  Skin:     General: Skin is warm and dry  Neurological:      Mental Status: She is alert and oriented to person, place, and time  Coordination: Finger-Nose-Finger Test normal       Gait: Gait is intact        Deep Tendon Reflexes: Strength normal       Reflex Scores:       Bicep reflexes are 1+ on the right side and 1+ on the left side  Patellar reflexes are 1+ on the right side and 1+ on the left side  Psychiatric:         Attention and Perception: Attention and perception normal          Mood and Affect: Mood and affect normal          Speech: Speech normal          Behavior: Behavior normal  Behavior is cooperative  Thought Content: Thought content normal          Cognition and Memory: Cognition and memory normal          Judgment: Judgment normal          Neurologic Exam     Mental Status   Oriented to person, place, and time  Follows 2 step commands  Attention: normal  Concentration: normal    Speech: speech is normal   Level of consciousness: alert  Knowledge: good  Able to perform simple calculations  Able to name object  Able to repeat  Normal comprehension  Cranial Nerves     CN II   Right visual field deficit: none  Left visual field deficit: none     CN III, IV, VI   Pupils are equal, round, and reactive to light  Extraocular motions are normal    CN III: no CN III palsy  CN VI: no CN VI palsy  Nystagmus: none   Diplopia: none  Conjugate gaze: present    CN V   Facial sensation intact  CN VII   Facial expression full, symmetric  CN VIII   CN VIII normal    Hearing: intact    CN IX, X   CN IX normal      CN XI   CN XI normal      CN XII   CN XII normal      Motor Exam   Muscle bulk: normal  Overall muscle tone: normal  Right arm pronator drift: absent  Left arm pronator drift: absent    Strength   Strength 5/5 throughout       Sensory Exam   Light touch normal    Proprioception normal    JPS and DST intact     Gait, Coordination, and Reflexes     Gait  Gait: normal    Coordination   Finger to nose coordination: normal    Tremor   Resting tremor: absent  Intention tremor: absent  Action tremor: absent    Reflexes   Right biceps: 1+  Left biceps: 1+  Right patellar: 1+  Left patellar: 1+  Right Jacobo: absent  Left Jacobo: absent  Right ankle clonus: absent  Left ankle clonus: absent        MEDICAL DECISION MAKING    Imaging Studies:     Echo complete w/ contrast if indicated    Result Date: 5/24/2022  Narrative: [facility]  Left Ventricle: Left ventricular cavity size is normal  There is mild concentric hypertrophy  The left ventricular ejection fraction is 60%  Systolic function is normal  Wall motion is normal    Left Atrium: The atrium is mildly dilated    Pulmonic Valve: There is mild regurgitation  I have personally reviewed pertinent reports     and I have personally reviewed pertinent films in PACS

## 2022-06-20 NOTE — ASSESSMENT & PLAN NOTE
Patient seen outpatient office today for continued follow-up status post pituitary adenoma resection  · Patient was originally seen in consult by our service in August 2019 for recurrent pituitary adenoma  · History of initial resection in 2004 by Joe Roca who was following with [facility] who noted reoccurrence in 2017 on serial imaging  · Patient underwent a image guided endoscopic transnasal transsphenoidal resection of pituitary mass and abdominal fat graft on 11/07/2019 by Dr Treviño and Dr Traore  · Final pathology:  Pituitary macroadenoma,gonadotrophic type secreting steroidogenic factor-1 (expert opinion from [facility] 12/11/19)  · Of note, pituitary labs in 2019 were normal   · Patient complaining of occasional headaches and dizziness when she exerts herself also with walking up steps with associated shortness of breath  She was seen by cardiology    Imaging:    MRI brain pituitary w/wo 4/21/22:Stable MR appearance of the postoperative sella without evidence for recurrent macroadenoma  Plan:  · Reviewed imaging with patient  · Recommend continued follow-up with cardiology for ongoing dizziness  · Patient will follow-up in 1 year with repeat MRI brain pituitary w/wo for ongoing surveillance or sooner if symptoms worsen  · Patient made aware to seek care sooner if she develops any new or worsening neurological changes or in flag signs  · Patient made aware to contact Neurosurgery with any further questions or concerns

## 2022-06-30 LAB — NT-PROBNP SERPL-MCNC: 58 PG/ML

## 2022-07-12 ENCOUNTER — OFFICE VISIT (OUTPATIENT)
Dept: CARDIOLOGY CLINIC | Facility: CLINIC | Age: 57
End: 2022-07-12
Payer: COMMERCIAL

## 2022-07-12 VITALS
HEIGHT: 63 IN | BODY MASS INDEX: 33.31 KG/M2 | SYSTOLIC BLOOD PRESSURE: 142 MMHG | WEIGHT: 188 LBS | DIASTOLIC BLOOD PRESSURE: 100 MMHG | HEART RATE: 80 BPM

## 2022-07-12 DIAGNOSIS — R06.02 SOB (SHORTNESS OF BREATH): ICD-10-CM

## 2022-07-12 DIAGNOSIS — R07.89 OTHER CHEST PAIN: ICD-10-CM

## 2022-07-12 DIAGNOSIS — I10 ESSENTIAL HYPERTENSION: Primary | ICD-10-CM

## 2022-07-12 DIAGNOSIS — E78.2 MIXED HYPERLIPIDEMIA: ICD-10-CM

## 2022-07-12 PROCEDURE — 99214 OFFICE O/P EST MOD 30 MIN: CPT | Performed by: PHYSICIAN ASSISTANT

## 2022-07-12 PROCEDURE — 3080F DIAST BP >= 90 MM HG: CPT | Performed by: PHYSICIAN ASSISTANT

## 2022-07-12 PROCEDURE — 3077F SYST BP >= 140 MM HG: CPT | Performed by: PHYSICIAN ASSISTANT

## 2022-07-12 RX ORDER — LISINOPRIL 10 MG/1
20 TABLET ORAL DAILY
Qty: 30 TABLET | Refills: 3 | Status: SHIPPED | OUTPATIENT
Start: 2022-07-12 | End: 2022-07-12

## 2022-07-12 RX ORDER — LISINOPRIL 20 MG/1
20 TABLET ORAL DAILY
Qty: 30 TABLET | Refills: 3 | Status: SHIPPED | OUTPATIENT
Start: 2022-07-12 | End: 2022-07-26

## 2022-07-12 NOTE — ASSESSMENT & PLAN NOTE
Uncontrolled  Will discontinue hydrochlorothiazide and start lisinopril 10 mg daily  Continue metoprolol 100 mg daily light of palpitations  Chem 7 will be checked in 20 days  Reassess in 1 month

## 2022-07-12 NOTE — ASSESSMENT & PLAN NOTE
Right-sided chest wall discomfort versus right breast pain  This is a noncardiac pain  Two-dimensional echocardiogram shows normal LV function without WMA

## 2022-07-12 NOTE — PATIENT INSTRUCTIONS
STOP Hydrochlorothiazide   Start Lisinopril 20 mg daily     Check blood work 10 days     Return in one month

## 2022-07-12 NOTE — PROGRESS NOTES
[facility]   Outpatient Note  Maia Lopez  1965  [id number]  [facility], [facility] [place] [address]  [facility] [place] is a 65 y o  female    Assessment and Plan:   Mixed hyperlipidemia  Tolerating statin therapy  Continue Crestor 10 mg daily    Essential hypertension  Uncontrolled  Will discontinue hydrochlorothiazide and start lisinopril 10 mg daily  Continue metoprolol 100 mg daily light of palpitations  Chem 7 will be checked in 20 days  Reassess in 1 month    SOB (shortness of breath)  Resolved  Echocardiogram shows preserved LV function and no WMA    Other chest pain  Right-sided chest wall discomfort versus right breast pain  This is a noncardiac pain  Two-dimensional echocardiogram shows normal LV function without WMA        Additional Plan:   Medications as detailed above  No testing ordered at this time  Available lab and test results are reviewed with the patient and any additional required labs are ordered as noted  Return visit will be in one month or earlier if there are problems  The patient is encouraged to call in the meantime if there are questions or concerns  Subjective: The patient is seen in the office today for routine follow-up regarding complaints of lightheadedness  She usually experiences this at after running up the stairs  It is associated with shortness of breath  She usually does not experience this with getting up too quickly from a chair or getting up from a lying position  She has no accompanying chest pain palpitations or syncope  She has no TIA or CVA symptoms  She has no PND or edema  She does not report orthopnea  Today she is complaining of right-sided chest wall discomfort that is in the breast tissue  It is of note that 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 and has had 2 successful surgeries   The 2nd being in   Her follow-up with her neurologist has been postponed due to [place]  She does have an upcoming follow-up in May of this year        Social History  Social History     Tobacco Use   Smoking Status Never Smoker   Smokeless Tobacco Never Used   ,   Social History     Substance and Sexual Activity   Alcohol Use Not Currently   ,   Social History     Substance and Sexual Activity   Drug Use Not Currently     Family History   Problem Relation Age of Onset    Cancer Mother     Heart disease Father     Hypertension Father     No Known Problems Daughter     No Known Problems Maternal Aunt     Leukemia Brother     No Known Problems Maternal Grandmother     No Known Problems Paternal Grandmother     No Known Problems Maternal Aunt     No Known Problems Paternal Aunt     No Known Problems Paternal Aunt        Medical and Surgical History  Past Medical History:   Diagnosis Date    Compression of optic chiasm 2019    GERD (gastroesophageal reflux disease)     Hypercholesteremia     Hypertension     Pituitary tumor      Past Surgical History:   Procedure Laterality Date    BRAIN SURGERY       SECTION      [place] NEUROENDOSCOP,EXC,PIT ESPERANZA,TRANSNAS/SPHEN N/A 2019    Procedure: Image guided endoscopic transnasal transsphenoidal resection of pituitary mass, abdominal fat graft, possible lumbar drain;  Surgeon: Alondra Ramirez MD;  [facility];  Service: Neurosurgery    TRANSPHENOIDAL / TRANSNASAL HYPOPHYSECTOMY / RESECTION PITUITARY TUMOR           Current Outpatient Medications:     aspirin (ECOTRIN LOW STRENGTH) 81 mg EC tablet, Take 81 mg by mouth as needed, Disp: , Rfl:     lisinopril (ZESTRIL) 20 mg tablet, Take 1 tablet (20 mg total) by mouth daily, Disp: 30 tablet, Rfl: 3    metoprolol succinate (TOPROL-XL) 100 mg 24 hr tablet, TAKE 1 TABLET BY MOUTH EVERY DAY, Disp: 90 tablet, Rfl: 1    rosuvastatin (CRESTOR) 10 MG tablet, TAKE 1 TABLET BY MOUTH EVERY DAY, Disp: 90 tablet, Rfl: 1  No Known Allergies    Review of Systems   Constitutional: Negative  HENT: Negative  Eyes: Negative  Cardiovascular: Positive for chest pain (Right-sided chest wall discomfort in the breast tissue) and dyspnea on exertion  Negative for claudication, cyanosis, irregular heartbeat, leg swelling, near-syncope, orthopnea, palpitations, paroxysmal nocturnal dyspnea and syncope  Respiratory: Negative  Negative for cough, hemoptysis, shortness of breath, sleep disturbances due to breathing, snoring, sputum production and wheezing  Endocrine: Negative  Hematologic/Lymphatic: Negative  Skin: Negative  Musculoskeletal: Negative  Gastrointestinal: Negative  Genitourinary: Negative  Neurological: Positive for light-headedness  Psychiatric/Behavioral: Negative  Allergic/Immunologic: Negative  Objective:   /100   Pulse 80   Ht 5' 3" (1 6 m)   Wt 85 3 kg (188 lb)   BMI 33 30 kg/m²   Physical Exam  Vitals and nursing note reviewed  Constitutional:       Appearance: She is well-developed  She is obese  HENT:      Head: Normocephalic and atraumatic  Mouth/Throat:      Mouth: Mucous membranes are moist    Eyes:      General: No scleral icterus  Conjunctiva/sclera: Conjunctivae normal    Neck:      Vascular: No JVD  Trachea: No tracheal deviation  Cardiovascular:      Rate and Rhythm: Normal rate and regular rhythm  Pulses: Intact distal pulses  Heart sounds: Normal heart sounds, S1 normal and S2 normal  No murmur heard  No friction rub  No gallop  Pulmonary:      Effort: Pulmonary effort is normal  No respiratory distress  Breath sounds: Normal breath sounds  No wheezing or rales  Chest:      Chest wall: Tenderness present  Abdominal:      General: Bowel sounds are normal  There is no distension  Palpations: Abdomen is soft  Tenderness: There is no abdominal tenderness        Comments: Aorta not palpable Musculoskeletal:         General: No tenderness  Normal range of motion  Cervical back: Normal range of motion and neck supple  Right lower leg: No edema  Left lower leg: No edema  Skin:     General: Skin is warm and dry  Coloration: Skin is not pale  Findings: No erythema or rash  Neurological:      General: No focal deficit present  Mental Status: She is alert and oriented to person, place, and time  Psychiatric:         Mood and Affect: Mood normal          Behavior: Behavior normal          Judgment: Judgment normal          Lab Review:   No results found for: CHOL  Lab Results   Component Value Date    HDL 64 08/17/2021     Lab Results   Component Value Date    LDLCALC 181 (H) 08/17/2021     Lab Results   Component Value Date    TRIG 93 08/17/2021     Results Reviewed     None        Results Reviewed     None        Results Reviewed     None          Recent Cardiovascular Testing:   Echo 05/24/2022:  Normal LVEF 60% mild LAE mild IA    Holter 48 hour 04/12/2022:  Normal sinus rhythm  average 84, SVT 53 beats ventricular ectopy 180 beats no significant pauses or arrhythmias  MAYA 04/09/2019:  Normal LVEF 60%, no ischemia      ECG Review:   04/01/2022:  Sinus bradycardia, LVH with repolarization abnormality, nonspecific ST T wave abnormality

## 2022-07-26 DIAGNOSIS — I10 ESSENTIAL HYPERTENSION: ICD-10-CM

## 2022-07-26 PROCEDURE — 4010F ACE/ARB THERAPY RXD/TAKEN: CPT | Performed by: PHYSICIAN ASSISTANT

## 2022-07-26 RX ORDER — LISINOPRIL 20 MG/1
TABLET ORAL
Qty: 90 TABLET | Refills: 2 | Status: SHIPPED | OUTPATIENT
Start: 2022-07-26

## 2022-08-08 ENCOUNTER — APPOINTMENT (OUTPATIENT)
Dept: LAB | Facility: CLINIC | Age: 57
End: 2022-08-08
Payer: COMMERCIAL

## 2022-08-08 DIAGNOSIS — R06.02 SOB (SHORTNESS OF BREATH): ICD-10-CM

## 2022-08-08 LAB
ANION GAP SERPL CALCULATED.3IONS-SCNC: 1 MMOL/L (ref 4–13)
BUN SERPL-MCNC: 23 MG/DL (ref 5–25)
CALCIUM SERPL-MCNC: 9.5 MG/DL (ref 8.3–10.1)
CHLORIDE SERPL-SCNC: 110 MMOL/L (ref 96–108)
CO2 SERPL-SCNC: 30 MMOL/L (ref 21–32)
CREAT SERPL-MCNC: 1.03 MG/DL (ref 0.6–1.3)
GFR SERPL CREATININE-BSD FRML MDRD: 60 ML/MIN/1.73SQ M
GLUCOSE P FAST SERPL-MCNC: 97 MG/DL (ref 65–99)
NT-PROBNP SERPL-MCNC: 61 PG/ML
POTASSIUM SERPL-SCNC: 3.7 MMOL/L (ref 3.5–5.3)
SODIUM SERPL-SCNC: 141 MMOL/L (ref 135–147)

## 2022-08-08 PROCEDURE — 36415 COLL VENOUS BLD VENIPUNCTURE: CPT | Performed by: PHYSICIAN ASSISTANT

## 2022-08-08 PROCEDURE — 83880 ASSAY OF NATRIURETIC PEPTIDE: CPT

## 2022-08-08 PROCEDURE — 80048 BASIC METABOLIC PNL TOTAL CA: CPT | Performed by: PHYSICIAN ASSISTANT

## 2022-11-04 DIAGNOSIS — I10 ESSENTIAL HYPERTENSION: ICD-10-CM

## 2022-11-04 RX ORDER — LISINOPRIL 20 MG/1
TABLET ORAL
Qty: 90 TABLET | Refills: 3 | Status: SHIPPED | OUTPATIENT
Start: 2022-11-04

## 2023-03-08 NOTE — TELEPHONE ENCOUNTER
Spoke to provider, letter provided, emailed to patient at her request 
Voice message transferred to my line, patient requesting letter for out of work extension  Called patient back and spoke to her, told her I would verify with provider and return call again  Patient states she is still not feeling 100%, has an appointment with ENT 12/9 and still having some ENT issues (did not clarify)    Patient would like to remain out of work for an additional 2 weeks 
no radiation

## 2023-06-20 ENCOUNTER — HOSPITAL ENCOUNTER (OUTPATIENT)
Dept: MRI IMAGING | Facility: HOSPITAL | Age: 58
Discharge: HOME | End: 2023-06-20
Payer: COMMERCIAL

## 2023-06-20 DIAGNOSIS — D35.2 PITUITARY MACROADENOMA (HCC): ICD-10-CM

## 2023-06-20 PROCEDURE — A9585 GADOBUTROL INJECTION: HCPCS | Performed by: NURSE PRACTITIONER

## 2023-06-20 PROCEDURE — G1004 CDSM NDSC: HCPCS

## 2023-06-20 PROCEDURE — 70553 MRI BRAIN STEM W/O & W/DYE: CPT

## 2023-06-20 RX ADMIN — GADOBUTROL 8.5 ML: 604.72 INJECTION INTRAVENOUS at 07:56

## 2023-06-26 ENCOUNTER — OFFICE VISIT (OUTPATIENT)
Dept: NEUROSURGERY | Facility: CLINIC | Age: 58
End: 2023-06-26
Payer: COMMERCIAL

## 2023-06-26 VITALS
WEIGHT: 191.4 LBS | BODY MASS INDEX: 33.91 KG/M2 | DIASTOLIC BLOOD PRESSURE: 88 MMHG | HEIGHT: 63 IN | HEART RATE: 74 BPM | SYSTOLIC BLOOD PRESSURE: 140 MMHG | OXYGEN SATURATION: 98 % | TEMPERATURE: 97.8 F

## 2023-06-26 DIAGNOSIS — D35.2 PITUITARY MACROADENOMA (HCC): Primary | ICD-10-CM

## 2023-06-26 PROCEDURE — 99213 OFFICE O/P EST LOW 20 MIN: CPT | Performed by: NURSE PRACTITIONER

## 2023-06-26 RX ORDER — LISINOPRIL 30 MG/1
30 TABLET ORAL DAILY
COMMUNITY
Start: 2023-04-12

## 2023-06-26 RX ORDER — HYDROCHLOROTHIAZIDE 12.5 MG/1
TABLET ORAL
COMMUNITY
Start: 2023-06-09

## 2023-06-26 RX ORDER — CETIRIZINE HYDROCHLORIDE 10 MG/1
10 TABLET ORAL DAILY
COMMUNITY
Start: 2023-05-09

## 2023-06-26 NOTE — ASSESSMENT & PLAN NOTE
Presents for annual surveillance s/p endoscopic TN/TS resection of pituitary mass on 11/7/2019 with Dr Bessy Nichole  · Final pathology: gonadotropic type secreting steroidogenic factor-1   · Denies any c/o HA, visual disturbance  · Last annual eye exam last year however no concern for chiasm compression as mass was never in contact  Imaging:  · MRI brain pituitary w/wo, 6/202/2023: Stable postsurgical appearance from prior pituitary macroadenoma resection  Stable enhancing soft tissue along the right lateral aspect of the spacious sella turcica abutting medial right cavernous ICA  Plan:  · Reviewed imaging extensively with patient  · Stable postoperative changes and adequate resection of pituitary macroadenoma  · Recommend continued annual surveillance until 5 years postop  · Can consider extending interval surveillance after next visit  · Follow up in 1 year with MRI brain pituitary w/wo

## 2023-06-26 NOTE — PROGRESS NOTES
Neurosurgery Office Note  Celi Santiago 63 y o  female MRN: [id number]      Assessment/Plan     Status post transsphenoidal pituitary resection 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   · Denies any c/o HA, visual disturbance  · Last annual eye exam last year however no concern for chiasm compression as mass was never in contact  Imaging:  · MRI brain pituitary w/wo, 6/202/2023: Stable postsurgical appearance from prior pituitary macroadenoma resection  Stable enhancing soft tissue along the right lateral aspect of the spacious sella turcica abutting medial right cavernous ICA  Plan:  · Reviewed imaging extensively with patient  · Stable postoperative changes and adequate resection of pituitary macroadenoma  · Recommend continued annual surveillance until 5 years postop  · Can consider extending interval surveillance after next visit  · Follow up in 1 year with MRI brain pituitary w/wo  Diagnoses and all orders for this visit:    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[facility] )  -     Cancel: MRI brain pituitary wo and w contrast; Future  -     MRI brain pituitary wo and w contrast; Future    Other orders  -     cetirizine (ZyrTEC) 10 mg tablet; Take 10 mg by mouth daily  -     hydrochlorothiazide (HYDRODIURIL) 12 5 mg tablet  -     lisinopril (ZESTRIL) 30 mg tablet; Take 30 mg by mouth daily          I have spent a total time of 30 minutes on 06/26/23 in caring for this patient including Diagnostic results, Risks and benefits of tx options, Instructions for management, Patient and family education, Impressions, Documenting in the medical record, Reviewing / ordering tests, medicine, procedures   and Obtaining or reviewing history          CHIEF COMPLAINT    Chief Complaint   Patient presents with   • Follow-up     1 year HISTORY    History of Present Illness     63y o  year old female     Past medical history of GERD, hypertension, hyperlipidemia, prediabetes, who presents for annual surveillance status post transsphenoidal transnasal endoscopic resection of pituitary mass with abdominal fat graft on 11/7/2019 with Dr Divine Headley and Dr María Elena Matos  She states she has been doing very well  She denies any headaches or visual disturbance  She no longer follows with endocrinology as her endocrine panel in 2019 was normal   Follows with cardiology and is on aspirin 81 mg daily  See Discussion    REVIEW OF SYSTEMS    Review of Systems   Constitutional: Negative  HENT: Negative  Eyes: Negative  Respiratory: Negative  Negative for shortness of breath  Cardiovascular: Negative  Gastrointestinal: Negative  Endocrine: Negative  Genitourinary: Negative  Musculoskeletal: Negative  Skin: Negative  Allergic/Immunologic: Negative  Neurological: Negative for dizziness, tremors, seizures, speech difficulty, weakness, light-headedness, numbness and headaches (slight headaches at the top of her head sometimes)  Hematological: Bruises/bleeds easily (asa81)  Psychiatric/Behavioral: Negative  ROS obtained by MA  Reviewed  See HPI       Meds/Allergies     Current Outpatient Medications   Medication Sig Dispense Refill   • aspirin (ECOTRIN LOW STRENGTH) 81 mg EC tablet Take 81 mg by mouth as needed     • cetirizine (ZyrTEC) 10 mg tablet Take 10 mg by mouth daily     • hydrochlorothiazide (HYDRODIURIL) 12 5 mg tablet      • lisinopril (ZESTRIL) 30 mg tablet Take 30 mg by mouth daily     • metoprolol succinate (TOPROL-XL) 100 mg 24 hr tablet TAKE 1 TABLET BY MOUTH EVERY DAY 90 tablet 1   • rosuvastatin (CRESTOR) 10 MG tablet TAKE 1 TABLET BY MOUTH EVERY DAY 90 tablet 1   • lisinopril (ZESTRIL) 20 mg tablet TAKE 1 TABLET BY MOUTH EVERY DAY (Patient not taking: Reported on 6/26/2023) 90 tablet 3     No "current facility-administered medications for this visit  No Known Allergies    PAST HISTORY    Past Medical History:   Diagnosis Date   • Compression of optic chiasm 2019   • Deep vein thrombosis (HCC)    • GERD (gastroesophageal reflux disease)    • Hypercholesteremia    • Hypertension    • Pituitary tumor        Past Surgical History:   Procedure Laterality Date   • BRAIN SURGERY     •  SECTION     • AR [facility] [place] [address] ICRA EXC PITUITRY ESPERANZA TRNSNSL/SPHENOID N/A 2019    Procedure: Image guided endoscopic transnasal transsphenoidal resection of pituitary mass, abdominal fat graft, possible lumbar drain;  Surgeon: Natalia Locke MD;  Location: [facility];  Service: Neurosurgery   • TRANSPHENOIDAL / TRANSNASAL HYPOPHYSECTOMY / RESECTION PITUITARY TUMOR         Social History     Tobacco Use   • Smoking status: Never   • Smokeless tobacco: Never   Vaping Use   • Vaping Use: Never used   Substance Use Topics   • Alcohol use: Not Currently   • Drug use: Never       Family History   Problem Relation Age of Onset   • Cancer Mother    • Dementia Mother    • Heart disease Father    • Hypertension Father    • No Known Problems Daughter    • No Known Problems Maternal Aunt    • Leukemia Brother    • No Known Problems Maternal Grandmother    • No Known Problems Paternal Grandmother    • No Known Problems Maternal Aunt    • No Known Problems Paternal Aunt    • No Known Problems Paternal Aunt          Above history personally reviewed  EXAM    Vitals:Blood pressure 140/88, pulse 74, temperature 97 8 °F (36 6 °C), temperature source Temporal, height 5' 3\" (1 6 m), weight 86 8 kg (191 lb 6 4 oz), SpO2 98 %  ,Body mass index is 33 9 kg/m²  Physical Exam  Constitutional:       Appearance: She is well-developed  She is obese  HENT:      Head: Normocephalic and atraumatic  Eyes:      Extraocular Movements: EOM normal       Pupils: Pupils are equal, round, and reactive to light     Pulmonary:      Effort: " Pulmonary effort is normal    Abdominal:      Palpations: Abdomen is soft  Musculoskeletal:         General: Normal range of motion  Cervical back: Normal range of motion and neck supple  Skin:     General: Skin is warm and dry  Neurological:      General: No focal deficit present  Mental Status: She is alert and oriented to person, place, and time  Mental status is at baseline  Cranial Nerves: Cranial nerves 2-12 are intact  No cranial nerve deficit  Sensory: No sensory deficit  Motor: No weakness  Coordination: Coordination normal  Finger-Nose-Finger Test normal    Psychiatric:         Speech: Speech normal          Neurologic Exam     Mental Status   Oriented to person, place, and time  Oriented to person  Oriented to place  Oriented to time  Oriented to year, month and date  Registration: recalls 3 of 3 objects  Attention: normal  Concentration: normal    Speech: speech is normal   Level of consciousness: alert  Knowledge: good and consistent with education  Able to name object  Cranial Nerves   Cranial nerves II through XII intact  CN III, IV, VI   Pupils are equal, round, and reactive to light  Extraocular motions are normal    Right pupil: Size: 3 mm  Shape: regular  Reactivity: brisk  Consensual response: intact  Accommodation: intact  Left pupil: Size: 3 mm  Shape: regular  Reactivity: brisk  Consensual response: intact  Accommodation: intact  Nystagmus: none   Diplopia: none  Conjugate gaze: present    CN V   Right facial sensation deficit: none  Left facial sensation deficit: none    CN VII   Facial expression full, symmetric       CN VIII   Hearing: intact    CN IX, X   Palate: symmetric    CN XI   Right sternocleidomastoid strength: normal  Left sternocleidomastoid strength: normal  Right trapezius strength: normal  Left trapezius strength: normal    CN XII   Tongue: not atrophic  Fasciculations: absent  Tongue deviation: none    Motor Exam Muscle bulk: normal  Overall muscle tone: normal  Right arm pronator drift: absent  Left arm pronator drift: absent    Strength   Right deltoid: 5/5  Left deltoid: 5/5  Right biceps: 5/5  Left biceps: 5/5  Right triceps: 5/5  Left triceps: 5/5  Right quadriceps: 5/5  Left quadriceps: 5/5  Right hamstrin/5  Left hamstrin/5  Right anterior tibial: 5/5  Left anterior tibial: 5/5  Right posterior tibial: 5/5  Left posterior tibial: 5/5  Right peroneal: 5/5  Left peroneal: 5/5  Right gastroc: 5/5  Left gastroc: 5/5    Sensory Exam   Light touch normal    Proprioception normal      Gait, Coordination, and Reflexes     Coordination   Finger to nose coordination: normal    Tremor   Resting tremor: absent  Intention tremor: absent  Action tremor: absent        MEDICAL DECISION MAKING     Imaging Studies:     MRI brain pituitary wo and w contrast    Result Date: 2023  Narrative: MRI BRAIN AND SELLA  WITH AND WITHOUT CONTRAST INDICATION:  D35 2: Benign neoplasm of pituitary gland COMPARISON: Brain MRI 2022 TECHNIQUE: Multiplanar, multisequence imaging of the brain and sella was performed before and after gadolinium administration  Targeted images of the sella were performed requiring additional time at acquisition and interpretation of approximately 25% IV Contrast:  8 5 mL of Gadobutrol injection (SINGLE-DOSE) IMAGE QUALITY:  Diagnostic  FINDINGS: BRAIN PARENCHYMA:  There is no discrete mass, mass effect or midline shift  Brainstem and cerebellum demonstrate normal signal  There is no intracranial hemorrhage  There is no evidence of acute infarction and diffusion imaging is unremarkable  No significant white matter signal abnormality  Stable old microhemorrhage in the right corona radiata (series 6 image 58)  Normal postcontrast imaging  VENTRICLES:  Normal for the patient's age  SELLA AND PITUITARY GLAND: Stable postoperative change from prior transsphenoidal resection of pituitary macroadenoma  Unchanged enlarged sella turcica with minimal residual pituitary tissue along the left floor of the sella and unchanged leftward deviated infundibulum  Stable enhancing soft tissue along the right lateral aspect of the sella turcica abutting medial right cavernous ICA measuring 1 x 1 2 x 0 5 cm (series 9 images 7-9)  Stable sagging of the chiasm and prechiasmatic optic nerves into the enlarged sella  ORBITS:  Normal  PARANASAL SINUSES:  Normal  VASCULATURE:  Evaluation of the major intracranial vasculature demonstrates appropriate flow voids  CALVARIUM AND SKULL BASE:  Normal  EXTRACRANIAL SOFT TISSUES:  Normal      Impression: Stable postsurgical appearance from prior pituitary macroadenoma resection  Stable enhancing soft tissue along the right lateral aspect of the spacious sella turcica abutting medial right cavernous ICA  Workstation performed: [id number]       I have personally reviewed pertinent reports     and I have personally reviewed pertinent films in PACS

## 2024-05-16 ENCOUNTER — ESTABLISHED COMPREHENSIVE EXAM (OUTPATIENT)
Dept: URBAN - METROPOLITAN AREA CLINIC 6 | Facility: CLINIC | Age: 59
End: 2024-05-16

## 2024-05-16 DIAGNOSIS — D44.3: ICD-10-CM

## 2024-05-16 DIAGNOSIS — H25.813: ICD-10-CM

## 2024-05-16 DIAGNOSIS — H04.123: ICD-10-CM

## 2024-05-16 PROCEDURE — 92015 DETERMINE REFRACTIVE STATE: CPT

## 2024-05-16 PROCEDURE — 92014 COMPRE OPH EXAM EST PT 1/>: CPT

## 2024-05-16 ASSESSMENT — TONOMETRY
OS_IOP_MMHG: 7
OD_IOP_MMHG: 7

## 2024-05-16 ASSESSMENT — VISUAL ACUITY
OS_CC: 20/40
OU_CC: J3
OD_CC: 20/40

## 2024-06-08 ENCOUNTER — APPOINTMENT (OUTPATIENT)
Dept: LAB | Facility: CLINIC | Age: 59
End: 2024-06-08
Payer: COMMERCIAL

## 2024-06-08 DIAGNOSIS — R73.03 BORDERLINE DIABETES MELLITUS: ICD-10-CM

## 2024-06-08 LAB
EST. AVERAGE GLUCOSE BLD GHB EST-MCNC: 140 MG/DL
HBA1C MFR BLD: 6.5 %

## 2024-06-08 PROCEDURE — 36415 COLL VENOUS BLD VENIPUNCTURE: CPT

## 2024-06-08 PROCEDURE — 83036 HEMOGLOBIN GLYCOSYLATED A1C: CPT

## 2024-06-12 ENCOUNTER — TELEPHONE (OUTPATIENT)
Age: 59
End: 2024-06-12

## 2024-06-12 DIAGNOSIS — Z01.818 PRE-PROCEDURAL EXAMINATION: Primary | ICD-10-CM

## 2024-06-12 NOTE — TELEPHONE ENCOUNTER
Patient phoned and informed that this RN place lab work orders for a BUN and Creatine prior to her upcoming imaging.  Patient informed that this is non fasting blood work and it can be obtained at at any [facility]'s lab, no need to schedule an appointment just walk in and the orders are in the computer so you do not need to have a paper script.    Georgina inquired when her imaging was.  This RN informed her of the date, time, and location of her imaging.  Patient stated thanks and that she was appreciative for the call.

## 2024-06-12 NOTE — TELEPHONE ENCOUNTER
----- Message from Cassandra VALENTINE sent at 6/12/2024  8:53 AM EDT -----  Regarding: FW: BUN and Creatinine    ----- Message -----  From: RAUL Otero  Sent: 6/12/2024   8:26 AM EDT  To: Keeley Lira RN; Anamaria Eden RN; #  Subject: FW: BUN and Creatinine                             ----- Message -----  From: Jennifer Wadalavage  Sent: 6/11/2024   5:07 PM EDT  To: RAUL Otero; #  Subject: BUN and Creatinine                               Pt is having a MRI on 6/17. Can you please put in a order for a BUN and Creatinine. Thank you.

## 2024-06-14 ENCOUNTER — APPOINTMENT (OUTPATIENT)
Dept: LAB | Facility: CLINIC | Age: 59
End: 2024-06-14
Payer: COMMERCIAL

## 2024-06-14 DIAGNOSIS — Z01.818 PRE-PROCEDURAL EXAMINATION: ICD-10-CM

## 2024-06-14 LAB
BUN SERPL-MCNC: 31 MG/DL (ref 5–25)
CREAT SERPL-MCNC: 1.05 MG/DL (ref 0.6–1.3)
GFR SERPL CREATININE-BSD FRML MDRD: 58 ML/MIN/1.73SQ M

## 2024-06-14 PROCEDURE — 82565 ASSAY OF CREATININE: CPT

## 2024-06-14 PROCEDURE — 36415 COLL VENOUS BLD VENIPUNCTURE: CPT

## 2024-06-14 PROCEDURE — 84520 ASSAY OF UREA NITROGEN: CPT

## 2024-06-17 ENCOUNTER — HOSPITAL ENCOUNTER (OUTPATIENT)
Dept: MRI IMAGING | Facility: HOSPITAL | Age: 59
Discharge: HOME | End: 2024-06-17
Payer: COMMERCIAL

## 2024-06-17 DIAGNOSIS — D35.2 PITUITARY MACROADENOMA (HCC): ICD-10-CM

## 2024-06-17 PROCEDURE — A9585 GADOBUTROL INJECTION: HCPCS | Performed by: NURSE PRACTITIONER

## 2024-06-17 PROCEDURE — G1004 CDSM NDSC: HCPCS

## 2024-06-17 PROCEDURE — 70553 MRI BRAIN STEM W/O & W/DYE: CPT

## 2024-06-17 RX ORDER — GADOBUTROL 604.72 MG/ML
8 INJECTION INTRAVENOUS
Status: COMPLETED | OUTPATIENT
Start: 2024-06-17 | End: 2024-06-17

## 2024-06-17 RX ADMIN — GADOBUTROL 8 ML: 604.72 INJECTION INTRAVENOUS at 06:54

## 2024-07-02 ENCOUNTER — OFFICE VISIT (OUTPATIENT)
Dept: NEUROSURGERY | Facility: CLINIC | Age: 59
End: 2024-07-02
Payer: COMMERCIAL

## 2024-07-02 VITALS
HEIGHT: 63 IN | WEIGHT: 186 LBS | OXYGEN SATURATION: 99 % | TEMPERATURE: 97.6 F | BODY MASS INDEX: 32.96 KG/M2 | DIASTOLIC BLOOD PRESSURE: 82 MMHG | HEART RATE: 48 BPM | RESPIRATION RATE: 18 BRPM | SYSTOLIC BLOOD PRESSURE: 130 MMHG

## 2024-07-02 DIAGNOSIS — D35.2 PITUITARY MACROADENOMA (HCC): Primary | ICD-10-CM

## 2024-07-02 DIAGNOSIS — E89.3 STATUS POST TRANSSPHENOIDAL PITUITARY RESECTION (HCC): ICD-10-CM

## 2024-07-02 PROCEDURE — 99215 OFFICE O/P EST HI 40 MIN: CPT | Performed by: NEUROLOGICAL SURGERY

## 2024-07-02 NOTE — ASSESSMENT & PLAN NOTE
Patient presents for annual surveillance of pituitary adenoma s/p endoscopic TNTS resection of pituitary mass on 11/7/2019 with Dr. Treviño  History of transsphenoidal transnasal surgery with Dr. 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 Treviño  MRI brain pituitary w/wo, 6/17/2023: Stable postoperative appearance of pituitary gland status post resection of adenoma.  Stable enhancing right lateral enhancing mass without significant change from immediate postop imaging February 2020.  Pituitary stalk remains deviated toward the left.    Plan:  Continue to monitor for any neurological symptoms  Original MRI imaging 2019 compared to postop imaging and most recent imaging reviewed in detail with patient.    No significant changes over the last 5-year postoperative time.  Given no neurological symptoms as well as stable imaging, will increase interval of follow-up to 2 years with MRI brain pituitary with and without contrast.  Patient will follow-up at that time as a joint appointment with Dr. Treviño.  All questions were answered.  Patient agreeable with plan of care.  She is aware to call the office with any questions or concerns at that time.

## 2024-07-02 NOTE — PROGRESS NOTES
Neurosurgery Office Note  Omayra Live 59 y.o. female MRN: [id number]      Assessment & Plan     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HCC)  Patient presents for annual surveillance of pituitary adenoma s/p endoscopic TNTS resection of pituitary mass on 11/7/2019 with Dr. Treviño  History of transsphenoidal transnasal surgery with Dr. 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 Treviño  MRI brain pituitary w/wo, 6/17/2023: Stable postoperative appearance of pituitary gland status post resection of adenoma.  Stable enhancing right lateral enhancing mass without significant change from immediate postop imaging February 2020.  Pituitary stalk remains deviated toward the left.    Plan:  Continue to monitor for any neurological symptoms  Original MRI imaging 2019 compared to postop imaging and most recent imaging reviewed in detail with patient.    No significant changes over the last 5-year postoperative time.  Given no neurological symptoms as well as stable imaging, will increase interval of follow-up to 2 years with MRI brain pituitary with and without contrast.  Patient will follow-up at that time as a joint appointment with Dr. Treviño.  All questions were answered.  Patient agreeable with plan of care.  She is aware to call the office with any questions or concerns at that time.    Status post transsphenoidal pituitary resection (HCC)  See plan as above       Diagnoses and all orders for this visit:    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HCC)  -     MRI brain pituitary wo and w contrast; Future    Status post transsphenoidal pituitary resection (HCC)          I have spent a total time of 47 minutes on 07/02/24 in caring for this patient including Diagnostic results, Prognosis, Instructions for management, Patient and family education, Impressions, Documenting in the medical record, Reviewing / ordering tests, medicine, procedures  ,  Obtaining or reviewing history  , and Communicating with other healthcare professionals .      CHIEF COMPLAINT    Chief Complaint   Patient presents with   • Follow-up     1 YR F/U MRI BRAIN       HISTORY    History of Present Illness     59 y.o. year old female     This is a pleasant 59-year-old female with past medical history of GERD, hypertension, hyperlipidemia, prediabetes, and pituitary adenoma status post transsphenoidal transnasal endoscopic resection of pituitary mass with abdominal fat graft on 11/7/2019 with Dr. Treviño and Dr. Traore who presents today for annual follow-up.  Patient also has a history of transsphenoidal transnasal surgery with Dr. Ennis is in 2004 at [facility].  Per documentation there was suggestion that this was a growth hormone secreting tumor but no reported evidence of this clinically or in her lab values.  Patient had original follow-up at [facility] with mention of recurrence in 2017.  She then established care with [facility] and follow-up imaging in 2019 showed considerable mass abutting and displacing the optic chiasm.  Her visual fields were intact at that time.  IGF-I was noted to be normal and she was without any acromegaly on exam.  For these reasons, patient underwent reresection with Dr. Treviño in 2019.  Patient is now 5 years status post reresection and underwent annual MRI imaging and presents today for review.    Today, patient offers no complaints today.  She states she is overall doing well.  She denies any headaches or vision changes.      REVIEW OF SYSTEMS    Review of Systems   Constitutional: Negative.    HENT: Negative.  Negative for tinnitus and trouble swallowing.    Eyes: Negative.  Negative for pain and visual disturbance.   Respiratory: Negative.     Cardiovascular: Negative.    Gastrointestinal: Negative.    Endocrine: Negative.    Genitourinary: Negative.    Musculoskeletal: Negative.  Negative for gait problem.   Skin: Negative.     Allergic/Immunologic: Negative.    Neurological:  Negative for dizziness, speech difficulty, weakness, light-headedness, numbness and headaches.   Hematological:  Bruises/bleeds easily (asa81).   Psychiatric/Behavioral:  Negative for confusion, decreased concentration and sleep disturbance.        ROS obtained by MA. Reviewed. See HPI.     Meds/Allergies     Current Outpatient Medications   Medication Sig Dispense Refill   • aspirin (ECOTRIN LOW STRENGTH) 81 mg EC tablet Take 81 mg by mouth as needed     • cetirizine (ZyrTEC) 10 mg tablet Take 10 mg by mouth daily     • hydrochlorothiazide (HYDRODIURIL) 12.5 mg tablet      • lisinopril (ZESTRIL) 30 mg tablet Take 30 mg by mouth daily     • metoprolol succinate (TOPROL-XL) 100 mg 24 hr tablet TAKE 1 TABLET BY MOUTH EVERY DAY 90 tablet 1   • lisinopril (ZESTRIL) 20 mg tablet TAKE 1 TABLET BY MOUTH EVERY DAY (Patient not taking: Reported on 2024) 90 tablet 3   • rosuvastatin (CRESTOR) 10 MG tablet TAKE 1 TABLET BY MOUTH EVERY DAY (Patient not taking: Reported on 2024) 90 tablet 1     No current facility-administered medications for this visit.       No Known Allergies    PAST HISTORY    Past Medical History:   Diagnosis Date   • Compression of optic chiasm 2019   • Deep vein thrombosis (HCC)    • GERD (gastroesophageal reflux disease)    • Hypercholesteremia    • Hypertension    • Pituitary tumor        Past Surgical History:   Procedure Laterality Date   • BRAIN SURGERY     •  SECTION     • [facility] NUNDSC ICRA EXC PITUITRY ESPERANZA TRNSNSL/SPHENOID N/A 2019    Procedure: Image guided endoscopic transnasal transsphenoidal resection of pituitary mass, abdominal fat graft, possible lumbar drain;  Surgeon: Isma Treviño MD;  Location: [facility];  Service: Neurosurgery   • TRANSPHENOIDAL / TRANSNASAL HYPOPHYSECTOMY / RESECTION PITUITARY TUMOR         Social History     Tobacco Use   • Smoking status: Never   • Smokeless tobacco: Never   Vaping Use   •  "Vaping status: Never Used   Substance Use Topics   • Alcohol use: Not Currently   • Drug use: Never       Family History   Problem Relation Age of Onset   • Cancer Mother    • Dementia Mother    • Heart disease Father    • Hypertension Father    • No Known Problems Daughter    • No Known Problems Maternal Aunt    • Leukemia Brother    • No Known Problems Maternal Grandmother    • No Known Problems Paternal Grandmother    • No Known Problems Maternal Aunt    • No Known Problems Paternal Aunt    • No Known Problems Paternal Aunt          Above history personally reviewed.       EXAM    Vitals:Blood pressure 130/82, pulse (!) 48, temperature 97.6 °F (36.4 °C), temperature source Temporal, resp. rate 18, height 5' 3\" (1.6 m), weight 84.4 kg (186 lb), SpO2 99%.,Body mass index is 32.95 kg/m².     Physical Exam  Constitutional:       General: She is not in acute distress.     Appearance: Normal appearance. She is well-developed. She is not ill-appearing.   HENT:      Head: Normocephalic and atraumatic.      Right Ear: External ear normal.      Left Ear: External ear normal.      Nose: Nose normal.      Mouth/Throat:      Mouth: Mucous membranes are moist.   Eyes:      General: No scleral icterus.        Right eye: No discharge.         Left eye: No discharge.      Extraocular Movements: Extraocular movements intact and EOM normal.      Conjunctiva/sclera: Conjunctivae normal.      Pupils: Pupils are equal, round, and reactive to light.   Cardiovascular:      Rate and Rhythm: Bradycardia present.   Pulmonary:      Effort: Pulmonary effort is normal. No respiratory distress.   Skin:     General: Skin is warm and dry.      Findings: No rash.   Neurological:      Mental Status: She is alert.      Motor: Motor strength is normal.     Gait: Gait is intact.   Psychiatric:         Mood and Affect: Mood normal.         Speech: Speech normal.         Behavior: Behavior normal.         Thought Content: Thought content normal.        "  Judgment: Judgment normal.       Neurologic Exam     Mental Status   Follows 3 step commands.   Attention: normal. Concentration: normal.   Speech: speech is normal   Level of consciousness: alert  Knowledge: good. Able to perform simple calculations.   Normal comprehension.     Cranial Nerves     CN II   Visual fields full to confrontation.     CN III, IV, VI   Pupils are equal, round, and reactive to light.  Extraocular motions are normal.   Right pupil: Size: 3 mm. Shape: regular. Reactivity: brisk.   Left pupil: Size: 3 mm. Shape: regular. Reactivity: brisk.   Upgaze: normal  Downgaze: normal  Conjugate gaze: present    CN V   Facial sensation intact.     CN VII   Facial expression full, symmetric.     CN VIII   Hearing: intact    CN XI   Right trapezius strength: normal  Left trapezius strength: normal    CN XII   Tongue: not atrophic  Fasciculations: absent  Tongue deviation: none    Motor Exam   Muscle bulk: normal  Overall muscle tone: normal  Right arm pronator drift: absent  Left arm pronator drift: absent    Strength   Strength 5/5 throughout.     Sensory Exam   Light touch normal.     Gait, Coordination, and Reflexes     Gait  Gait: normal    Tremor   Resting tremor: absent  Intention tremor: absent  Action tremor: absent    Reflexes   Right Jacobo: absent  Left Jacobo: absent        MEDICAL DECISION MAKING    Imaging Studies:     MRI brain pituitary wo and w contrast    Result Date: 6/25/2024  Narrative: MRI BRAIN AND SELLA  WITH AND WITHOUT CONTRAST INDICATION:  D35.2: Benign neoplasm of pituitary gland. Prior pituitary resection. COMPARISON: 6/20/2023 TECHNIQUE: Multiplanar, multisequence imaging of the brain and sella was performed before and after gadolinium administration. Targeted images of the sella were performed requiring additional time at acquisition and interpretation of approximately 25% IV Contrast:  8 mL of Gadobutrol injection (SINGLE-DOSE) IMAGE QUALITY:  Diagnostic. FINDINGS: BRAIN  PARENCHYMA:  There is no discrete mass, mass effect or midline shift.  Brainstem and cerebellum demonstrate normal signal. There is no intracranial hemorrhage.  There is no evidence of acute infarction and diffusion imaging is unremarkable.  There are no white matter changes in the cerebral hemispheres. Normal postcontrast imaging. VENTRICLES:  Normal for the patient's age. SELLA AND PITUITARY GLAND: The patient has undergone previous transsphenoidal pituitary resection with fat packing along the anterior inferior aspect of the sella at the surgical site. Sella turcica is mildly enlarged. There is only a small amount of pituitary tissue noted inferiorly and towards the left where the stalk is deviated to. Mild thickening of the stalk. There is enhancing pituitary tissue noted within the right lateral aspect of the gland. Overall the appearance of the pituitary gland is unchanged from the prior examination. ORBITS:  Normal. PARANASAL SINUSES:  Normal. VASCULATURE:  Evaluation of the major intracranial vasculature demonstrates appropriate flow voids. CALVARIUM AND SKULL BASE:  Normal. EXTRACRANIAL SOFT TISSUES:  Normal.     Impression: Stable postoperative appearance of the pituitary gland status post resection of adenoma. Stable enhancing pituitary tissue noted within the right lateral aspect of the sella and slightly heterogeneous appearance of the pituitary stalk and minimal residual pituitary tissue centrally and towards the left. Workstation performed: [id number]       I have personally reviewed pertinent reports.   and I have personally reviewed pertinent films in PACS

## 2024-08-23 ENCOUNTER — APPOINTMENT (EMERGENCY)
Dept: CT IMAGING | Facility: HOSPITAL | Age: 59
End: 2024-08-23
Payer: COMMERCIAL

## 2024-08-23 ENCOUNTER — HOSPITAL ENCOUNTER (EMERGENCY)
Facility: HOSPITAL | Age: 59
Discharge: HOME | End: 2024-08-24
Attending: EMERGENCY MEDICINE
Payer: COMMERCIAL

## 2024-08-23 VITALS
OXYGEN SATURATION: 98 % | HEART RATE: 63 BPM | RESPIRATION RATE: 17 BRPM | SYSTOLIC BLOOD PRESSURE: 139 MMHG | TEMPERATURE: 97 F | DIASTOLIC BLOOD PRESSURE: 87 MMHG

## 2024-08-23 DIAGNOSIS — S22.41XA CLOSED FRACTURE OF MULTIPLE RIBS OF RIGHT SIDE, INITIAL ENCOUNTER: Primary | ICD-10-CM

## 2024-08-23 LAB
ANION GAP SERPL CALCULATED.3IONS-SCNC: 6 MMOL/L (ref 4–13)
BASOPHILS # BLD AUTO: 0.04 THOUSANDS/ÂΜL (ref 0–0.1)
BASOPHILS NFR BLD AUTO: 1 % (ref 0–1)
BUN SERPL-MCNC: 28 MG/DL (ref 5–25)
CALCIUM SERPL-MCNC: 9.7 MG/DL (ref 8.4–10.2)
CHLORIDE SERPL-SCNC: 106 MMOL/L (ref 96–108)
CO2 SERPL-SCNC: 27 MMOL/L (ref 21–32)
CREAT SERPL-MCNC: 1.21 MG/DL (ref 0.6–1.3)
EOSINOPHIL # BLD AUTO: 0.16 THOUSAND/ÂΜL (ref 0–0.61)
EOSINOPHIL NFR BLD AUTO: 2 % (ref 0–6)
ERYTHROCYTE [DISTWIDTH] IN BLOOD BY AUTOMATED COUNT: 13.9 % (ref 11.6–15.1)
GFR SERPL CREATININE-BSD FRML MDRD: 49 ML/MIN/1.73SQ M
GLUCOSE SERPL-MCNC: 101 MG/DL (ref 65–140)
HCT VFR BLD AUTO: 36.8 % (ref 34.8–46.1)
HGB BLD-MCNC: 11.4 G/DL (ref 11.5–15.4)
IMM GRANULOCYTES # BLD AUTO: 0.02 THOUSAND/UL (ref 0–0.2)
IMM GRANULOCYTES NFR BLD AUTO: 0 % (ref 0–2)
LYMPHOCYTES # BLD AUTO: 4.29 THOUSANDS/ÂΜL (ref 0.6–4.47)
LYMPHOCYTES NFR BLD AUTO: 59 % (ref 14–44)
MCH RBC QN AUTO: 27.4 PG (ref 26.8–34.3)
MCHC RBC AUTO-ENTMCNC: 31 G/DL (ref 31.4–37.4)
MCV RBC AUTO: 89 FL (ref 82–98)
MONOCYTES # BLD AUTO: 0.63 THOUSAND/ÂΜL (ref 0.17–1.22)
MONOCYTES NFR BLD AUTO: 9 % (ref 4–12)
NEUTROPHILS # BLD AUTO: 2.12 THOUSANDS/ÂΜL (ref 1.85–7.62)
NEUTS SEG NFR BLD AUTO: 29 % (ref 43–75)
NRBC BLD AUTO-RTO: 0 /100 WBCS
PLATELET # BLD AUTO: 191 THOUSANDS/UL (ref 149–390)
PMV BLD AUTO: 12.5 FL (ref 8.9–12.7)
POTASSIUM SERPL-SCNC: 4.3 MMOL/L (ref 3.5–5.3)
RBC # BLD AUTO: 4.16 MILLION/UL (ref 3.81–5.12)
SODIUM SERPL-SCNC: 139 MMOL/L (ref 135–147)
WBC # BLD AUTO: 7.26 THOUSAND/UL (ref 4.31–10.16)

## 2024-08-23 PROCEDURE — 99285 EMERGENCY DEPT VISIT HI MDM: CPT | Performed by: EMERGENCY MEDICINE

## 2024-08-23 PROCEDURE — 85025 COMPLETE CBC W/AUTO DIFF WBC: CPT | Performed by: EMERGENCY MEDICINE

## 2024-08-23 PROCEDURE — 96374 THER/PROPH/DIAG INJ IV PUSH: CPT

## 2024-08-23 PROCEDURE — 36415 COLL VENOUS BLD VENIPUNCTURE: CPT | Performed by: EMERGENCY MEDICINE

## 2024-08-23 PROCEDURE — 99284 EMERGENCY DEPT VISIT MOD MDM: CPT

## 2024-08-23 PROCEDURE — 74177 CT ABD & PELVIS W/CONTRAST: CPT

## 2024-08-23 PROCEDURE — 80048 BASIC METABOLIC PNL TOTAL CA: CPT | Performed by: EMERGENCY MEDICINE

## 2024-08-23 PROCEDURE — 71260 CT THORAX DX C+: CPT

## 2024-08-23 RX ORDER — FENTANYL CITRATE 50 UG/ML
50 INJECTION, SOLUTION INTRAMUSCULAR; INTRAVENOUS ONCE
Status: COMPLETED | OUTPATIENT
Start: 2024-08-23 | End: 2024-08-23

## 2024-08-23 RX ORDER — ACETAMINOPHEN 325 MG/1
975 TABLET ORAL ONCE
Status: COMPLETED | OUTPATIENT
Start: 2024-08-23 | End: 2024-08-23

## 2024-08-23 RX ADMIN — FENTANYL CITRATE 50 MCG: 50 INJECTION INTRAMUSCULAR; INTRAVENOUS at 22:15

## 2024-08-23 RX ADMIN — IOHEXOL 100 ML: 350 INJECTION, SOLUTION INTRAVENOUS at 22:52

## 2024-08-23 RX ADMIN — ACETAMINOPHEN 975 MG: 325 TABLET ORAL at 22:14

## 2024-08-24 RX ORDER — LIDOCAINE 50 MG/G
1 PATCH TOPICAL DAILY
Qty: 10 PATCH | Refills: 0 | Status: SHIPPED | OUTPATIENT
Start: 2024-08-24 | End: 2024-09-03

## 2024-08-24 RX ORDER — LIDOCAINE 50 MG/G
1 PATCH TOPICAL ONCE
Status: DISCONTINUED | OUTPATIENT
Start: 2024-08-24 | End: 2024-08-24 | Stop reason: HOSPADM

## 2024-08-24 RX ORDER — OXYCODONE HYDROCHLORIDE 5 MG/1
5 CAPSULE ORAL EVERY 4 HOURS PRN
Qty: 15 CAPSULE | Refills: 0 | Status: SHIPPED | OUTPATIENT
Start: 2024-08-24 | End: 2024-09-03

## 2024-08-24 RX ADMIN — LIDOCAINE 1 PATCH: 50 PATCH CUTANEOUS at 01:11

## 2024-08-24 NOTE — ED PROVIDER NOTES
History  Chief Complaint   Patient presents with    Back Injury     Patient slipped and fell onto well pipe sticking out of the ground. Right sided back/ right flank     Patient is a 59-year-old female who presents for evaluation after mechanical fall.  Patient says she tripped and she fell on a well pipe sticking out of the ground.  She is complaining of pain to the right lower posterior rib/back area.  She denies head strike or LOC.  She is on a daily aspirin.  Denies headache complaints, is, nausea or vomiting.  Her main complaint is in that area.  She has been ambulatory since the incident.        Prior to Admission Medications   Prescriptions Last Dose Informant Patient Reported? Taking?   aspirin (ECOTRIN LOW STRENGTH) 81 mg EC tablet   Yes No   Sig: Take 81 mg by mouth as needed   cetirizine (ZyrTEC) 10 mg tablet   Yes No   Sig: Take 10 mg by mouth daily   hydrochlorothiazide (HYDRODIURIL) 12.5 mg tablet   Yes No   lisinopril (ZESTRIL) 20 mg tablet   No No   Sig: TAKE 1 TABLET BY MOUTH EVERY DAY   Patient not taking: Reported on 2024   lisinopril (ZESTRIL) 30 mg tablet   Yes No   Sig: Take 30 mg by mouth daily   metoprolol succinate (TOPROL-XL) 100 mg 24 hr tablet   No No   Sig: TAKE 1 TABLET BY MOUTH EVERY DAY   rosuvastatin (CRESTOR) 10 MG tablet   No No   Sig: TAKE 1 TABLET BY MOUTH EVERY DAY   Patient not taking: Reported on 2024      Facility-Administered Medications: None       Past Medical History:   Diagnosis Date    Compression of optic chiasm 2019    Deep vein thrombosis (HCC)     GERD (gastroesophageal reflux disease)     Hypercholesteremia     Hypertension     Pituitary tumor        Past Surgical History:   Procedure Laterality Date    BRAIN SURGERY       SECTION      TN NUNDSC ICRA EXC PITUITRY ESPERANZA TRNSNSL/SPHENOID N/A 2019    Procedure: Image guided endoscopic transnasal transsphenoidal resection of pituitary mass, abdominal fat graft, possible lumbar drain;  Surgeon:  Isma Treviño MD;  Location: [facility];  Service: Neurosurgery    TRANSPHENOIDAL / TRANSNASAL HYPOPHYSECTOMY / RESECTION PITUITARY TUMOR         Family History   Problem Relation Age of Onset    Cancer Mother     Dementia Mother     Heart disease Father     Hypertension Father     No Known Problems Daughter     No Known Problems Maternal Aunt     Leukemia Brother     No Known Problems Maternal Grandmother     No Known Problems Paternal Grandmother     No Known Problems Maternal Aunt     No Known Problems Paternal Aunt     No Known Problems Paternal Aunt      I have reviewed and agree with the history as documented.    E-Cigarette/Vaping    E-Cigarette Use Never User      E-Cigarette/Vaping Substances    Nicotine No     THC No     CBD No     Flavoring No     Other No     Unknown No      Social History     Tobacco Use    Smoking status: Never    Smokeless tobacco: Never   Vaping Use    Vaping status: Never Used   Substance Use Topics    Alcohol use: Not Currently    Drug use: Never       Review of Systems   Constitutional:  Negative for fever and unexpected weight change.   HENT:  Negative for congestion, ear pain, sore throat and trouble swallowing.    Eyes:  Negative for pain and redness.   Respiratory:  Negative for cough, chest tightness and shortness of breath.    Cardiovascular:  Negative for chest pain and leg swelling.   Gastrointestinal:  Negative for abdominal distention, abdominal pain, diarrhea and vomiting.   Endocrine: Negative for polyuria.   Genitourinary:  Negative for dysuria, hematuria, pelvic pain and vaginal bleeding.   Musculoskeletal:  Positive for back pain. Negative for myalgias.   Skin:  Negative for rash.   Neurological:  Negative for dizziness, syncope, weakness, light-headedness and headaches.       Physical Exam  Physical Exam  Vitals and nursing note reviewed.   Constitutional:       General: She is not in acute distress.     Appearance: She is well-developed.   HENT:      Head:  Normocephalic and atraumatic.      Right Ear: External ear normal.      Left Ear: External ear normal.      Nose: Nose normal.      Mouth/Throat:      Mouth: Mucous membranes are moist.      Pharynx: No oropharyngeal exudate.   Eyes:      Conjunctiva/sclera: Conjunctivae normal.      Pupils: Pupils are equal, round, and reactive to light.   Cardiovascular:      Rate and Rhythm: Normal rate and regular rhythm.      Heart sounds: Normal heart sounds. No murmur heard.     No friction rub. No gallop.   Pulmonary:      Effort: Pulmonary effort is normal. No respiratory distress.      Breath sounds: Normal breath sounds. No wheezing or rales.   Abdominal:      General: There is no distension.      Palpations: Abdomen is soft.      Tenderness: There is no abdominal tenderness. There is no guarding.   Musculoskeletal:         General: No swelling, tenderness or deformity. Normal range of motion.      Cervical back: Normal range of motion and neck supple. No tenderness.        Back:       Comments: No T or L-spine tenderness   Lymphadenopathy:      Cervical: No cervical adenopathy.   Skin:     General: Skin is warm and dry.   Neurological:      General: No focal deficit present.      Mental Status: She is alert and oriented to person, place, and time. Mental status is at baseline.      Cranial Nerves: No cranial nerve deficit.      Sensory: No sensory deficit.      Motor: No weakness or abnormal muscle tone.      Coordination: Coordination normal.         Vital Signs  ED Triage Vitals [08/23/24 2137]   Temperature Pulse Respirations Blood Pressure SpO2   (!) 97 °F (36.1 °C) 63 17 139/87 98 %      Temp Source Heart Rate Source Patient Position - Orthostatic VS BP Location FiO2 (%)   Tympanic Monitor Sitting Left arm --      Pain Score       10 - Worst Possible Pain           Vitals:    08/23/24 2137   BP: 139/87   Pulse: 63   Patient Position - Orthostatic VS: Sitting         Visual Acuity      ED Medications  Medications    fentaNYL injection 50 mcg (50 mcg Intravenous Given 8/23/24 2215)   acetaminophen (TYLENOL) tablet 975 mg (975 mg Oral Given 8/23/24 2214)   iohexol (OMNIPAQUE) 350 MG/ML injection (MULTI-DOSE) 100 mL (100 mL Intravenous Given 8/23/24 2252)       Diagnostic Studies  Results Reviewed       Procedure Component Value Units Date/Time    Basic metabolic panel [[id number]]  (Abnormal) Collected: 08/23/24 2218    Lab Status: Final result Specimen: Blood from Arm, Left Updated: 08/23/24 2242     Sodium 139 mmol/L      Potassium 4.3 mmol/L      Chloride 106 mmol/L      CO2 27 mmol/L      ANION GAP 6 mmol/L      BUN 28 mg/dL      Creatinine 1.21 mg/dL      Glucose 101 mg/dL      Calcium 9.7 mg/dL      eGFR 49 ml/min/1.73sq m     Narrative:      Slightly Hemolyzed:Results may be affected.  National Kid[employer] Foundation guidelines for Chronic Kidney Disease (CKD):     Stage 1 with normal or high GFR (GFR > 90 mL/min/1.73 square meters)    Stage 2 Mild CKD (GFR = 60-89 mL/min/1.73 square meters)    Stage 3A Moderate CKD (GFR = 45-59 mL/min/1.73 square meters)    Stage 3B Moderate CKD (GFR = 30-44 mL/min/1.73 square meters)    Stage 4 Severe CKD (GFR = 15-29 mL/min/1.73 square meters)    Stage 5 End Stage CKD (GFR <15 mL/min/1.73 square meters)  Note: GFR calculation is accurate only with a steady state creatinine    CBC and differential [[id number]]  (Abnormal) Collected: 08/23/24 2218    Lab Status: Final result Specimen: Blood from Arm, Left Updated: 08/23/24 2221     WBC 7.26 Thousand/uL      RBC 4.16 Million/uL      Hemoglobin 11.4 g/dL      Hematocrit 36.8 %      MCV 89 fL      MCH 27.4 pg      MCHC 31.0 g/dL      RDW 13.9 %      MPV 12.5 fL      Platelets 191 Thousands/uL      nRBC 0 /100 WBCs      Segmented % 29 %      Immature Grans % 0 %      Lymphocytes % 59 %      Monocytes % 9 %      Eosinophils Relative 2 %      Basophils Relative 1 %      Absolute Neutrophils 2.12 Thousands/µL      Absolute Immature Grans 0.02  Thousand/uL      Absolute Lymphocytes 4.29 Thousands/µL      Absolute Monocytes 0.63 Thousand/µL      Eosinophils Absolute 0.16 Thousand/µL      Basophils Absolute 0.04 Thousands/µL                    CT chest abdomen pelvis w contrast   Final Result by Randy Giron MD (08/24 0048)   Addendum (preliminary) 1 of 1 by Randy Giron MD (08/24 0048)   ADDENDUM:      Nondisplaced right posterior 11th and 12th rib fractures noted.      Final      No acute visceral/vascular injury in the chest, abdomen, or pelvis. Small amount of nonspecific lower pelvic free fluid.         Workstation performed: [id number]                    Procedures  Procedures         ED Course  ED Course as of 08/25/24 1201   Sat Aug 24, 2024   0047 Spoke with Dr. Giron of radiology.  On second look, he did notice nondisplaced fractures to the right 11th and 12th ribs.                                 SBIRT 20yo+      Flowsheet Row Most Recent Value   Initial Alcohol Screen: US AUDIT-C     1. How often do you have a drink containing alcohol? 0 Filed at: 08/23/2024 2137   2. How many drinks containing alcohol do you have on a typical day you are drinking?  0 Filed at: 08/23/2024 2137   3a. Male UNDER 65: How often do you have five or more drinks on one occasion? 0 Filed at: 08/23/2024 2137   3b. FEMALE Any Age, or MALE 65+: How often do you have 4 or more drinks on one occassion? 0 Filed at: 08/23/2024 2137   Audit-C Score 0 Filed at: 08/23/2024 2137   KEIRA: How many times in the past year have you...    Used an illegal drug or used a prescription medication for non-medical reasons? Never Filed at: 08/23/2024 2137                      Medical Decision Making  51-year-old female presenting for mechanical fall.  Fell on a well pipe second of the ground.  Having pain to the right posterior lower ribs/back area.  No T or L-spine tenderness.  No chest wall tenderness.  No head strike.  Will obtain CBC, CMP pro time CT chest abdomen pelvis with contrast  rule out rib fracture versus splenic injury other other acute injury.  Will give patient a dose of fentanyl and Tylenol.    Problems Addressed:  Closed fracture of multiple ribs of right side, initial encounter: acute illness or injury    Amount and/or Complexity of Data Reviewed  Labs: ordered.  Radiology: ordered.    Risk  OTC drugs.  Prescription drug management.                 Disposition  Final diagnoses:   Closed fracture of multiple ribs of right side, initial encounter     Time reflects when diagnosis was documented in both MDM as applicable and the Disposition within this note       Time User Action Codes Description Comment    8/24/2024 12:48 AM Silvino Crump Add [S22.41XA] Closed fracture of multiple ribs of right side, initial encounter           ED Disposition       ED Disposition   Discharge    Condition   Stable    Date/Time   Sat Aug 24, 2024 0048    Comment   Omayra Live discharge to home/self care.                   Follow-up Information       Follow up With Specialties Details Why Contact Info Additional Information    [facility], Nurse Practitioner Schedule an appointment as soon as possible for a visit  For follow up of symptoms [address]  [place] [facility] [address] [phone]       [facility] Emergency Medicine Go to  If symptoms worsen [address] [facility] [address]  [phone] [facility], [address] [facility], [place], [place] [address]            Discharge Medication List as of 8/24/2024 12:55 AM        START taking these medications    Details   lidocaine (Lidoderm) 5 % Apply 1 patch topically over 12 hours daily for 10 days Remove & Discard patch within 12 hours or as directed by MD, Starting Sat 8/24/2024, Until Tue 9/3/2024, Normal      oxyCODONE (OXY-IR) 5 MG capsule Take 1 capsule (5 mg total) by mouth every 4 (four) hours as needed for moderate  pain for up to 10 days Max Daily Amount: 30 mg, Starting Sat 8/24/2024, Until Tue 9/3/2024 at 2359, Normal           CONTINUE these medications which have NOT CHANGED    Details   aspirin (ECOTRIN LOW STRENGTH) 81 mg EC tablet Take 81 mg by mouth as needed, Historical Med      cetirizine (ZyrTEC) 10 mg tablet Take 10 mg by mouth daily, Starting Tue 5/9/2023, Historical Med      hydrochlorothiazide (HYDRODIURIL) 12.5 mg tablet Starting Fri 6/9/2023, Historical Med      !! lisinopril (ZESTRIL) 20 mg tablet TAKE 1 TABLET BY MOUTH EVERY DAY, Normal      !! lisinopril (ZESTRIL) 30 mg tablet Take 30 mg by mouth daily, Starting Wed 4/12/2023, Historical Med      metoprolol succinate (TOPROL-XL) 100 mg 24 hr tablet TAKE 1 TABLET BY MOUTH EVERY DAY, Normal      rosuvastatin (CRESTOR) 10 MG tablet TAKE 1 TABLET BY MOUTH EVERY DAY, Normal       !! - Potential duplicate medications found. Please discuss with provider.          No discharge procedures on file.    PDMP Review         Value Time User    PDMP Reviewed  Yes 11/14/2020  9:30 AM RAUL Poe            ED Provider  Electronically Signed by             Silvino Crump DO  08/25/24 3755

## 2024-08-24 NOTE — DISCHARGE INSTRUCTIONS
It is important that you use the incentive is spirometer multiple times a day as instructed.  Continue to take Tylenol and ice the area.  Take the pain medicine as needed.

## (undated) DEVICE — TOOL T12MH25 LEGEND 12CM 2.5MM MH: Brand: MIDAS REX ™

## (undated) DEVICE — Device: Brand: MEDEX

## (undated) DEVICE — GAUZE SPONGES,USP TYPE VII GAUZE, 12 PLY: Brand: CURITY

## (undated) DEVICE — 1820 FOAM BLOCK NEEDLE COUNTER: Brand: DEVON

## (undated) DEVICE — MARKER REFLECTIVE RADIOPAQUE SPHERE

## (undated) DEVICE — SPONGE 4 X 4 XRAY 16 PLY STRL LF RFD

## (undated) DEVICE — GLOVE SRG BIOGEL ECLIPSE 8

## (undated) DEVICE — ELECTRODE BLADE MOD  E-Z CLEAN 6.5IN -0014M

## (undated) DEVICE — MICRODISSECTION NEEDLE STRAIGHT SLEEVE: Brand: COLORADO

## (undated) DEVICE — BLADE TYMPANOPLASTY 2.5MM 60DEG ANGL BVL DOWN SPOON TIP SHARP

## (undated) DEVICE — BLADE 1884004HR TRICUT 5PK M4 4MM ROTATE: Brand: TRICUT

## (undated) DEVICE — MAYFIELD® DISPOSABLE ADULT SKULL PIN (PLASTIC BASE): Brand: MAYFIELD®

## (undated) DEVICE — GLOVE SRG BIOGEL ECLIPSE 7

## (undated) DEVICE — DRAPE CAMERA/LASER

## (undated) DEVICE — NEURO PATTIES 1/4 X 1/4

## (undated) DEVICE — ASTOUND STANDARD SURGICAL GOWN, XL: Brand: CONVERTORS

## (undated) DEVICE — DURASEAL EXTENDED APPLICATOR TIP 15CM

## (undated) DEVICE — SURGIFOAM 8.5 X 12.5

## (undated) DEVICE — 3M™ STERI-STRIP™ REINFORCED ADHESIVE SKIN CLOSURES, R1547, 1/2 IN X 4 IN (12 MM X 100 MM), 6 STRIPS/ENVELOPE: Brand: 3M™ STERI-STRIP™

## (undated) DEVICE — SYRINGE 3ML LL

## (undated) DEVICE — DRAPE TOWEL: Brand: CONVERTORS

## (undated) DEVICE — SUT VICRYL PLUS 3-0 RB-1 CR/8 18 IN VCP713D

## (undated) DEVICE — FLOSEAL HEMOSTATIC MATRIX, 5 ML: Brand: FLOSEAL

## (undated) DEVICE — SUT MONOCRYL PLUS 3-0 PS-2 27 IN MCP427H

## (undated) DEVICE — SPONGE STICK WITH PVP-I: Brand: KENDALL

## (undated) DEVICE — GLOVE INDICATOR PI UNDERGLOVE SZ 8 BLUE

## (undated) DEVICE — 3M™ TEGADERM™ TRANSPARENT FILM DRESSING FRAME STYLE, 1626W, 4 IN X 4-3/4 IN (10 CM X 12 CM), 50/CT 4CT/CASE: Brand: 3M™ TEGADERM™

## (undated) DEVICE — INTENDED FOR TISSUE SEPARATION, AND OTHER PROCEDURES THAT REQUIRE A SHARP SURGICAL BLADE TO PUNCTURE OR CUT.: Brand: BARD-PARKER SAFETY BLADES SIZE 15, STERILE

## (undated) DEVICE — Device

## (undated) DEVICE — NEEDLE SPINAL 22G X 3.5IN  QUINCKE

## (undated) DEVICE — SYRINGE 50ML LL

## (undated) DEVICE — THE FLOSEAL MALLEABLE TIP AND TRIMMABLE TIP ARE INTENDED FOR DELIVERY OF FLOSEAL HEMOSTATIC MATRIX.: Brand: FLOSEAL SPECIAL APPLICATOR TIPS

## (undated) DEVICE — DRAPE FLUID WARMER (BIRD BATH)

## (undated) DEVICE — SURGICEL 4 X 8

## (undated) DEVICE — PACK CRANIOTOMY PBDS RF

## (undated) DEVICE — TIBURON SPLIT SHEET: Brand: CONVERTORS

## (undated) DEVICE — BETADINE OINTMENT FOIL PACK

## (undated) DEVICE — ANTI-FOG SOLUTION WITH FOAM PAD: Brand: DEVON

## (undated) DEVICE — TUBING SUCTION 5MM X 12 FT

## (undated) DEVICE — TRAY FOLEY 16FR URIMETER SURESTEP

## (undated) DEVICE — PREP SURGICAL PURPREP 26ML

## (undated) DEVICE — SUCTION COAGULATOR 10FR FOOT CNTRL MEGADYNE

## (undated) DEVICE — 3M™ IOBAN™ 2 ANTIMICROBIAL INCISE DRAPE 6650EZ: Brand: IOBAN™ 2

## (undated) DEVICE — SPONGE PVP SCRUB WING STERILE

## (undated) DEVICE — TELFA NON-ADHERENT ABSORBENT DRESSING: Brand: TELFA

## (undated) DEVICE — SHEATH 1912000 5PK 4MM/0DEG STORZ XOMED: Brand: ENDO-SCRUB®

## (undated) DEVICE — DURASEAL 5ML

## (undated) DEVICE — SPECIMEN CONTAINER STERILE PEEL PACK